# Patient Record
Sex: MALE | Race: BLACK OR AFRICAN AMERICAN | NOT HISPANIC OR LATINO | Employment: UNEMPLOYED | URBAN - METROPOLITAN AREA
[De-identification: names, ages, dates, MRNs, and addresses within clinical notes are randomized per-mention and may not be internally consistent; named-entity substitution may affect disease eponyms.]

---

## 2017-12-04 ENCOUNTER — HOSPITAL ENCOUNTER (INPATIENT)
Facility: CLINIC | Age: 22
LOS: 3 days | Discharge: HOME | DRG: 885 | End: 2017-12-07
Attending: EMERGENCY MEDICINE | Admitting: PSYCHIATRY & NEUROLOGY
Payer: COMMERCIAL

## 2017-12-04 ENCOUNTER — TELEPHONE (OUTPATIENT)
Dept: BEHAVIORAL HEALTH | Facility: CLINIC | Age: 22
End: 2017-12-04

## 2017-12-04 DIAGNOSIS — F12.20 MARIJUANA DEPENDENCE (H): ICD-10-CM

## 2017-12-04 DIAGNOSIS — F29 PSYCHOSIS, UNSPECIFIED PSYCHOSIS TYPE (H): ICD-10-CM

## 2017-12-04 LAB
AMPHETAMINES UR QL SCN: NEGATIVE
BARBITURATES UR QL: NEGATIVE
BENZODIAZ UR QL: NEGATIVE
CANNABINOIDS UR QL SCN: POSITIVE
COCAINE UR QL: NEGATIVE
ETHANOL SERPL-MCNC: <0.01 G/DL
OPIATES UR QL SCN: NEGATIVE
PCP UR QL SCN: NEGATIVE

## 2017-12-04 PROCEDURE — 80320 DRUG SCREEN QUANTALCOHOLS: CPT | Performed by: EMERGENCY MEDICINE

## 2017-12-04 PROCEDURE — 12400006 ZZH R&B MH INTERMEDIATE

## 2017-12-04 PROCEDURE — 90791 PSYCH DIAGNOSTIC EVALUATION: CPT

## 2017-12-04 PROCEDURE — 80307 DRUG TEST PRSMV CHEM ANLYZR: CPT | Performed by: EMERGENCY MEDICINE

## 2017-12-04 PROCEDURE — 99285 EMERGENCY DEPT VISIT HI MDM: CPT | Mod: 25

## 2017-12-04 RX ORDER — HYDROXYZINE HYDROCHLORIDE 25 MG/1
25-50 TABLET, FILM COATED ORAL EVERY 4 HOURS PRN
Status: DISCONTINUED | OUTPATIENT
Start: 2017-12-04 | End: 2017-12-07 | Stop reason: HOSPADM

## 2017-12-04 ASSESSMENT — ACTIVITIES OF DAILY LIVING (ADL)
RETIRED_COMMUNICATION: 0-->UNDERSTANDS/COMMUNICATES WITHOUT DIFFICULTY
TOILETING: 0-->INDEPENDENT
RETIRED_EATING: 0-->INDEPENDENT
BATHING: 0-->INDEPENDENT
TRANSFERRING: 0-->INDEPENDENT
SWALLOWING: 0-->SWALLOWS FOODS/LIQUIDS WITHOUT DIFFICULTY
DRESS: 0-->INDEPENDENT
AMBULATION: 0-->INDEPENDENT
COGNITION: 0 - NO COGNITION ISSUES REPORTED
FALL_HISTORY_WITHIN_LAST_SIX_MONTHS: NO

## 2017-12-04 ASSESSMENT — ENCOUNTER SYMPTOMS: DYSPHORIC MOOD: 1
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
## 2017-12-04 NOTE — ED NOTES
Bed: 2  Expected date:   Expected time:   Means of arrival:   Comments:  514  24 M depressed/suicidal/coop  1500

## 2017-12-04 NOTE — IP AVS SNAPSHOT
[place] [address] [place] [address]    Phone:  [phone]                                       After Visit Summary   12/4/2017    Natalio Rodas    MRN: [id number]           After Visit Summary Signature Page     I have received my discharge instructions, and my questions have been answered. I have discussed any challenges I see with this plan with the nurse or doctor.    ..........................................................................................................................................  Patient/Patient Representative Signature      ..........................................................................................................................................  Patient Representative Print Name and Relationship to Patient    ..................................................               ................................................  Date                                            Time    ..........................................................................................................................................  Reviewed by Signature/Title    ...................................................              ..............................................  Date                                                            Time
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
## 2017-12-04 NOTE — IP AVS SNAPSHOT
MRN:[id number]                      After Visit Summary   12/4/2017    Natalio Rodas    MRN: [id number]           Thank you!     Thank you for choosing [facility] for your care. Our goal is always to provide you with excellent care.        Patient Information     Date Of Birth          1995        Designated Caregiver       Most Recent Value    Caregiver    Will someone help with your care after discharge? no      About your hospital stay     You were admitted on:  December 4, 2017 You last received care in the:  [facility]    You were discharged on:  December 7, 2017       Who to Call     For medical emergencies, please call 911.  For non-urgent questions about your medical care, please call your primary care provider or clinic, None          Attending Provider     Provider Specialty    Jocelyne Foreman MD Emergency Medicine    [facility], Ana Cazares MD Psychiatry       Primary Care Provider Fax #    Physician No Ref-Primary [phone]      Further instructions from your care team       Behavioral Discharge Planning and Instructions    Summary:  Admitted for worsening thought disorder.     Main Diagnosis:  Schizophrenia, in acute exacerbation; Cannabis Use Disorder; Anxiety Disorder, unspecified; Tobacco Use Disorder.     Major Treatments, Procedures and Findings:  Psychiatric assessment. Medication adjustment.     Symptoms to Report: Feeling more agitated, Increased confusion, Losing more sleep, Mood getting worse or Thoughts of suicide    Lifestyle Adjustment:  Follow all treatment recommendations. Develop and follow safety plan.  Abstain from the use of all mood-altering substances, including alcohol and marijuana, as usage may increase symptoms of psychosis. Maintain sobriety by attending daily AA or NA meetings and obtaining a sponsor.     Psychiatry Follow-up:     You indicated that you have been seen previously at [facility] in  [place] for psychiatry. You declined to sign a release of information form for [facility] so we were unable to set up any follow up appointments for you prior to discharge. You are encouraged to call and set up a follow up appointment with your current provider after discharge.     [facility] [address], [address]  [place] [address]  [phone] / [phone] fax    Resources:   Crisis Intervention: 479.237.4208 or 314-503-5820 (TTY: 596.258.3396).  Call anytime for help.  National [place] on Mental Illness (www.[place].rozina.org): 107.834.2066 or 943-278-3770.  Alcoholics Anonymous (www.alcoholics-anonymous.org): Check your phone book for your local chapter.  National Suicide Prevention Line (www.mentalhealthmn.org): 066-706-AXZN (3555)  COPE (Crisis Services for Adults) in [facility]: 620.635.2558 (Available 24/7)    General Medication Instructions:   See your medication sheet(s) for instructions.   Take all medicines as directed.  Make no changes unless your doctor suggests them.   Go to all your doctor visits.  Be sure to have all your required lab tests. This way, your medicines can be refilled on time.  Do not use any drugs not prescribed by your doctor.  Avoid alcohol.      Pending Results     No orders found from 12/2/2017 to 12/5/2017.            Statement of Approval     Ordered          12/07/17 8399  I have reviewed and agree with all the recommendations and orders detailed in this document.  EFFECTIVE NOW     Approved and electronically signed by:  Ana Jones MD             Admission Information     Date & Time Provider Department Dept. Phone    12/4/2017 Ana Jones MD [facility] [phone]      Your Vitals Were     Blood Pressure Pulse Temperature Respirations Height Weight    131/90 70 97.9  F (36.6  C) (Oral) 16 1.778 m (5' 10\") 63.5 kg (140 lb)    Pulse Oximetry BMI " "(Body Mass Index)                99% 20.09 kg/m2          [employer] Information     [employer] lets you send messages to your doctor, view your test results, renew your prescriptions, schedule appointments and more. To sign up, go to [email or web address] . Click on \"Log in\" on the left side of the screen, which will take you to the Welcome page. Then click on \"Sign up Now\" on the right side of the page.     You will be asked to enter the access code listed below, as well as some personal information. Please follow the directions to create your username and password.     Your access code is: [email or web address]  Expires: 3/7/2018 12:29 PM     Your access code will  in 90 days. If you need help or a new code, please call your [facility] clinic or [phone].        Care EveryWhere ID     This is your Care EveryWhere ID. This could be used by other organizations to access your [facility] medical records  [id number]        Equal Access to Services     [facility]: Hadii hakan garciao Sobel, waaxda luqadaha, qaybta kaalmada adeegyazoila, shola horner . So [facility] [phone].    ATENCIÓN: Si habla español, tiene a burciaga disposición servicios gratuitos de asistencia lingüística. [facility] [phone].    We comply with applicable federal civil rights laws and [place] laws. We do not discriminate on the basis of race, color, national origin, age, disability, sex, sexual orientation, or gender identity.               Review of your medicines      START taking        Dose / Directions    ARIPiprazole 10 MG tablet   Commonly known as:  ABILIFY   Used for:  Psychosis, unspecified psychosis type        Dose:  10 mg   Start taking on:  2017   Take 1 tablet (10 mg) by mouth daily   Quantity:  30 tablet   Refills:  0       hydrOXYzine 25 MG tablet   Commonly known as:  ATARAX   Used for:  Psychosis, unspecified psychosis type        Dose:  25-50 mg   Take 1-2 tablets (25-50 mg) by mouth every 6 hours " as needed for anxiety   Quantity:  120 tablet   Refills:  0            Where to get your medicines      These medications were sent to [facility] [place], [place] - [address]  [address] [place] [address]     Phone:  [phone]     ARIPiprazole 10 MG tablet    hydrOXYzine 25 MG tablet                Protect others around you: Learn how to safely use, store and throw away your medicines at www.disposemymeds.org.             Medication List: This is a list of all your medications and when to take them. Check marks below indicate your daily home schedule. Keep this list as a reference.      Medications           Morning Afternoon Evening Bedtime As Needed    ARIPiprazole 10 MG tablet   Commonly known as:  ABILIFY   Take 1 tablet (10 mg) by mouth daily   Start taking on:  12/8/2017   Last time this was given:  10 mg on 12/7/2017  8:08 AM                                hydrOXYzine 25 MG tablet   Commonly known as:  ATARAX   Take 1-2 tablets (25-50 mg) by mouth every 6 hours as needed for anxiety                                          More Information        Schizophrenia (General Type)  Schizophrenia is a chronic, often disabling mental health disorder that makes functioning in work and society difficult. It is a type of psychosis, and involves perceiving reality differently from those around you. The difference been reality and what you think become blurred in your mind.  The cause of schizophrenia is not yet known. It is believed to be a result of genetic and biological factors (brain chemistry and structure). Schizophrenia does run in families and occurs in about 1% of the adult population. Environmental factors may also have a role in schizophrenia. These may include where you grew up, toxins, and infections.  Symptoms include:    Hallucinations (seeing or hearing things that are not there)    Delusions (false beliefs)    Disorganized thinking and speech    Social  withdrawal    Severe anxiety    Feeling unreal    Paranoia    Insomnia    Trouble thinking or concentrating clearly    Depression, feeling suicidal    Withdrawal from those around you  Medicines and therapy can help with many of the symptoms and allow for better daily function and quality of life. These medicines take 2 to 4 weeks to start working and 6 to 8 weeks to take full effect.  It is common to feel that you are not ill and that you don't need treatment. It is important to accept the support of friends and family in continuing to take your medicine.  Home care    Ongoing care and support helps manage this disease. Find a healthcare provider and therapist who meet your needs. Seek help when you feel like your symptoms are getting worse.    Tell each of your healthcare providers about all of the prescription medicines, over-the-counter medicines, and supplements you take. Certain supplements interact with medicines and can cause dangerous side effects. Ask your pharmacist when you have questions about drug interactions.    Be sure to take all of your medicine as directed and get regular blood work to check your medicine level and your overall health. Take the medicines and get the follow-up lab work as prescribed, even if you think you don t need it.    Seek support from trusted friends or family by talking about your feelings and thoughts. Ask them to help you recognize behavior changes early so you can get help and medicines can be adjusted.    If you are having trouble managing workplace issues, or caring for yourself because of your schizophrenia, contact your local Americans with Disabilities (ADA) office to see if they can help. The U.[place]. Department of Justice operates a toll-free ADA information line at: 339.393.2740 (voice) or 936-628-2676 (TTY). They can help you locate a local office.  Follow-up care  Follow up with your doctor or therapist , or as advised.  Call 911  Call 911 if you:    Have suicidal  thoughts, a suicide plan, and the means to carry out the plan    Have trouble breathing    Are very confused    Are very drowsy or have trouble awakening    Feel faint or lose consciousness    Have rapid heart rate, very low heart rate, or a new irregular heart rate    Have a seizure  When to seek medical advice  Call your healthcare provider right away if any of these occur:    Your symptoms are getting worse    Family or friends express concern over your behavior and ask you to seek help    Feeling out of control or that you are being controlled by others    Feeling like you want to harm yourself or another    Unable to care for yourself    Worsening hallucinations (hearing voices)    Hearing voices that are telling you to harm yourself or others    Worsening depression or anxiety  Date Last Reviewed: 9/29/2015 2000-2017 [employer]. [address] [place] [address]. All rights reserved. This information is not intended as a substitute for professional medical care. Always follow your healthcare professional's instructions.                Recovering from Addiction  Recovery means making a new life for yourself. This includes finding new interests. It involves building new relationships. It means taking better care of yourself. These will all help you replace substance use with a new and healthier life. They will also help you avoid the things that could make you want to use again.    Make lifestyle changes  A big part of recovery is changing habits. These are habits that may have led to your substance abuse. It s also a time for personal growth. Below are some changes you may want to make.    Find new activities and goals. You may want to try new hobbies and interests. Or, you may want to join an activity group to meet new people.    Build relationships. You may choose to spend more time with loved ones you lost touch with while you were using. You may also want to make new friends. And  there may be some friends or family members you will not want to see because they are still using.    Exercise and eat well. Get some physical activity on most days. This can help you feel better. Eat healthy meals with lots of fruits, vegetables, and whole grains. This can also help your well-being. A nutritionist or fitness expert can help you.    Maintain ties with medical and addiction professionals. While you may not need intense professional support, it is important to have reliable safety nets so you can access professional assistance when needed.    Relax and get enough sleep. Good sleep can help you feel better. So can less stress. Ask your counselor about relaxation exercises. These may include meditation. Also ask about stress management classes.  Date Last Reviewed: 2/1/2017 2000-2017 [employer]. [address] [place] [address], [place] [address]. All rights reserved. This information is not intended as a substitute for professional medical care. Always follow your healthcare professional's instructions.                Understanding the Disease of Addiction  What is addiction?  Addiction is a long-lasting (chronic) disease of the brain. It affects how your brain learns and works.Your genes and your environment can affect your risk for addiction. A family history of addiction also raises your risk. But anyone can have an addiction.Unfortunately, many people falsely think that addiction is a moral weakness. They think that people addicted to drugs or alcohol are just behaving badly or making poor choices.  How does addiction affect my brain?  Whether you start using drugs or alcohol is your choice. But once your brain is exposed to the addictive substance, your brain begins to change. This is especially true if you are more at risk for addiction. These brain changes overpower your self-control. This happens because the substance overexcites the brain s reward center. The substance mimics the  brain's own natural feel-good chemicals. The brain is rewired into believing that the substance is a good thing and that you need it to survive. This rewiring is very strong. Over time, you no longer find pleasure in other things you once enjoyed. The addiction is more powerful.  If you keep using the substance, your brain makes less of its own feel-good chemicals. You then must keep using drugs or alcohol to try to make up for the low levels of the brain chemicals. Over time your brain needs more and more of the drug or alcohol to achieve this. You need the drug. You no longer think about the physical, emotional, and social harm it causes.  Can you become addicted to things other than drugs or alcohol?  Addiction can happen in response to other pleasurable things that stimulate the brain s reward center. These things include eating, having sex, gambling, using tobacco, and using the internet.  Can you get control over a brain disease?  The only way to get over an addiction is to stop using the substance. Not using it lets your brain recover and go back to its normal functioning. You can relearn how to find pleasure in other things again. But your brain will always be at risk for addiction. Addiction is very powerful. So you usually will need medical help and social support for long-term success.  Addiction is a chronic condition. It s common for people who are recovering from addiction to start using the substance again (called a relapse). This doesn t mean that treatment doesn t work. Just like other chronic health conditions, addiction requires ongoing treatment that changes as the person s needs change.    Date Last Reviewed: 5/1/2017 2000-2017 [employer]. [address] [place] [address], [place] [address]. All rights reserved. This information is not intended as a substitute for professional medical care. Always follow your healthcare professional's instructions.

## 2017-12-05 LAB
ANION GAP SERPL CALCULATED.3IONS-SCNC: 10 MMOL/L (ref 3–14)
BASOPHILS # BLD AUTO: 0 10E9/L (ref 0–0.2)
BASOPHILS NFR BLD AUTO: 0.5 %
BUN SERPL-MCNC: 9 MG/DL (ref 7–30)
CALCIUM SERPL-MCNC: 9 MG/DL (ref 8.5–10.1)
CHLORIDE SERPL-SCNC: 105 MMOL/L (ref 94–109)
CO2 SERPL-SCNC: 26 MMOL/L (ref 20–32)
CREAT SERPL-MCNC: 0.98 MG/DL (ref 0.66–1.25)
DIFFERENTIAL METHOD BLD: NORMAL
EOSINOPHIL # BLD AUTO: 0.1 10E9/L (ref 0–0.7)
EOSINOPHIL NFR BLD AUTO: 1.1 %
ERYTHROCYTE [DISTWIDTH] IN BLOOD BY AUTOMATED COUNT: 13 % (ref 10–15)
GFR SERPL CREATININE-BSD FRML MDRD: >90 ML/MIN/1.7M2
GLUCOSE SERPL-MCNC: 84 MG/DL (ref 70–99)
HCT VFR BLD AUTO: 45 % (ref 40–53)
HGB BLD-MCNC: 15.3 G/DL (ref 13.3–17.7)
IMM GRANULOCYTES # BLD: 0 10E9/L (ref 0–0.4)
IMM GRANULOCYTES NFR BLD: 0.1 %
LYMPHOCYTES # BLD AUTO: 2.3 10E9/L (ref 0.8–5.3)
LYMPHOCYTES NFR BLD AUTO: 29.8 %
MCH RBC QN AUTO: 30.4 PG (ref 26.5–33)
MCHC RBC AUTO-ENTMCNC: 34 G/DL (ref 31.5–36.5)
MCV RBC AUTO: 89 FL (ref 78–100)
MONOCYTES # BLD AUTO: 0.5 10E9/L (ref 0–1.3)
MONOCYTES NFR BLD AUTO: 7 %
NEUTROPHILS # BLD AUTO: 4.7 10E9/L (ref 1.6–8.3)
NEUTROPHILS NFR BLD AUTO: 61.5 %
NRBC # BLD AUTO: 0 10*3/UL
NRBC BLD AUTO-RTO: 0 /100
PLATELET # BLD AUTO: 321 10E9/L (ref 150–450)
POTASSIUM SERPL-SCNC: 3.9 MMOL/L (ref 3.4–5.3)
RBC # BLD AUTO: 5.04 10E12/L (ref 4.4–5.9)
SODIUM SERPL-SCNC: 141 MMOL/L (ref 133–144)
TSH SERPL DL<=0.005 MIU/L-ACNC: 0.81 MU/L (ref 0.4–4)
WBC # BLD AUTO: 7.6 10E9/L (ref 4–11)

## 2017-12-05 PROCEDURE — 80048 BASIC METABOLIC PNL TOTAL CA: CPT | Performed by: PSYCHIATRY & NEUROLOGY

## 2017-12-05 PROCEDURE — 84443 ASSAY THYROID STIM HORMONE: CPT | Performed by: PSYCHIATRY & NEUROLOGY

## 2017-12-05 PROCEDURE — 25000132 ZZH RX MED GY IP 250 OP 250 PS 637: Performed by: PSYCHIATRY & NEUROLOGY

## 2017-12-05 PROCEDURE — 12400006 ZZH R&B MH INTERMEDIATE

## 2017-12-05 PROCEDURE — 85025 COMPLETE CBC W/AUTO DIFF WBC: CPT | Performed by: PSYCHIATRY & NEUROLOGY

## 2017-12-05 PROCEDURE — 36415 COLL VENOUS BLD VENIPUNCTURE: CPT | Performed by: PSYCHIATRY & NEUROLOGY

## 2017-12-05 PROCEDURE — 99221 1ST HOSP IP/OBS SF/LOW 40: CPT | Mod: AI | Performed by: PHYSICIAN ASSISTANT

## 2017-12-05 RX ORDER — QUETIAPINE FUMARATE 25 MG/1
25-50 TABLET, FILM COATED ORAL EVERY 6 HOURS PRN
Status: DISCONTINUED | OUTPATIENT
Start: 2017-12-05 | End: 2017-12-07 | Stop reason: HOSPADM

## 2017-12-05 RX ORDER — ARIPIPRAZOLE 10 MG/1
10 TABLET ORAL DAILY
Status: DISCONTINUED | OUTPATIENT
Start: 2017-12-05 | End: 2017-12-07 | Stop reason: HOSPADM

## 2017-12-05 RX ADMIN — ARIPIPRAZOLE 10 MG: 10 TABLET ORAL at 12:35

## 2017-12-05 RX ADMIN — QUETIAPINE FUMARATE 50 MG: 25 TABLET, FILM COATED ORAL at 18:24

## 2017-12-05 ASSESSMENT — ACTIVITIES OF DAILY LIVING (ADL)
LAUNDRY: WITH SUPERVISION
DRESS: STREET CLOTHES;INDEPENDENT
GROOMING: INDEPENDENT
ORAL_HYGIENE: INDEPENDENT
DRESS: SCRUBS (BEHAVIORAL HEALTH)
LAUNDRY: WITH SUPERVISION
GROOMING: INDEPENDENT;SHOWER
ORAL_HYGIENE: INDEPENDENT
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
## 2017-12-05 NOTE — ED NOTES
Pt is slow to respond to questions, takes 20-30sec to respond to a question. Only responds to about half of questions asked. Flat affect. Calm and cooperative.

## 2017-12-05 NOTE — TELEPHONE ENCOUNTER
S: Lindsay gave clinical saying pt was bib ems to [facility] er after he called 911 due to saying he was turning himself in b/c he felt he might die and was scared he may stop eating and living and caring about life.  B: hx of psych admit at [facility] 1 yr ago. Hx of marij induced psychosis. Utox pos for marij. He denies SI and HI. No hx of SA's. He said he does not feel safe going home but can not elaborate on this. He said he does not know how to put his bed down. He said he needs to eat and sleep. He says he has not slept in days. He denies biggs's. Dx: cannabis induced psychotic d/o. No chronic med prob's.   A: delayed answers or no responses, flat affect, almost catatonic, med cleared, coop   R: emergency admission hold; #77/martha accepted for [facility]

## 2017-12-05 NOTE — PLAN OF CARE
Problem: Depressive Symptoms  Goal: Depressive Symptoms  Signs and symptoms of listed problems will be absent or manageable.   Patient presents with a flat and blunt affect. He was visible but withdrawn from peers. Patient spent majority of the shift in the [facility] lounge pacing and watching TV. He appears to be internally preoccupied, but denies any hallucinations. Patient was cooperative with medications. He stated in 1:1 that he feels the Ablifiy has helped with his racing thoughts, but he feels that he would be able fully heal if he was able to go home and come back for groups. Patient was pleasant with staff and peers throughout the shift.

## 2017-12-05 NOTE — ED NOTES
"Gave patient chex ceral, apple juice and orange juice. When staff gave patient the spoon and removed the plastic wrap patient responded with \" Thanks for taking the plastic I cherish that\".    "

## 2017-12-05 NOTE — PROGRESS NOTES
12/04/17 2343   Patient Belongings   Did you bring any home meds/supplements to the hospital?  No   Disposition of Belongings Locker   Belongings Search Yes   Clothing Search Yes   Second Staff Angie Jeans Belt Boxers T shirt Shoes w/ geno  Cell phone (cracked)  Wallet  Insurance cards  [place] 's license      Security Envelope #[id number]  Visa [phone]  Visa [phone]  Visa [phone]          Admission:  I am responsible for any personal items that are not sent to the safe or pharmacy.  [facility] is not responsible for loss, theft or damage of any property in my possession.    Signature:  _________________________________ Date: _______  Time: _____                                              Staff Signature:  ____________________________ Date: ________  Time: _____      2nd Staff person, if patient is unable/unwilling to sign:    Signature: ________________________________ Date: ________  Time: _____     Discharge:  [facility] has returned all of my personal belongings:    Signature: _________________________________ Date: ________  Time: _____                                          Staff Signature:  ____________________________ Date: ________  Time: _____

## 2017-12-05 NOTE — H&P
PRIMARY CARE PROVIDER:  Unlisted.      CHIEF COMPLAINT:  Withdrawal.      HISTORY OF PRESENT ILLNESS:  Natalio Rodas is a 22-year-old male with past medical history of psychosis thought to be secondary to substance abuse who presented to the Emergency Department initially for evaluation of withdrawal.  However, on further conversation with the patient, it appeared that he had decompensated in regard to his mental health status.  He was making statements of being afraid to be alone and feeling like giving up on life.  On further discussion with the patient's family, it appears that he has had increasing stressors recently and there have been some concerns of his inability to cope with these stressors.  There is also concern from the Emergency Department provider as well as DEC  that the patient was unsafe to discharge.  Therefore, he was recommended to be admitted to the Psychiatric Unit for further evaluation.      The patient is presently evaluated in the [facility] side of Inpatient Psychiatry.  He currently reports that he would like to speak with a therapist and that he is not interested in speaking with anyone else.  He is cooperative on examination.  He gives a limited history regarding his living situation and any personal details.  He does deny any recent fevers, chills or cough or difficulty breathing.      PAST MEDICAL HISTORY:   1.  Psychosis, thought to be secondary to substance abuse, specifically cannabis abuse.  The patient notably was admitted at the [facility] and a stay of commitment was obtained at that time.  This was in 09/2017.   2.  Schizophrenia.      PAST SURGICAL HISTORY:  None.      PRIOR TO ADMISSION MEDICATIONS:  The patient is not taking any medications at this time.      ALLERGIES:  No known drug allergies.      FAMILY HISTORY:  Reviewed with the patient and not felt to be contributory to the present admission.      SOCIAL HISTORY:  The patient currently is living  with his father.  He is using marijuana on a regular basis, although he is unable to tell me exactly how much.  He denies consuming alcohol.      REVIEW OF SYSTEMS:  A 10-point review of systems was performed and is otherwise negative.  Please refer to the HPI.      PHYSICAL EXAMINATION:     VITAL SIGNS:  Temperature 98.7, heart rate of 62, respiratory rate of 16, blood pressure 139/101.  SpO2 99% on room air.   GENERAL:  This is a well-developed, well-nourished male who appears comfortable.   HEENT:  Head is normocephalic.  Pupils are equal, round, and react to light bilaterally.  EOMs are intact bilaterally.  Nose and mouth are patent.  Mucous membranes are moist.   LUNGS:  Clear to auscultation bilaterally without wheezes or crackles.   CARDIOVASCULAR:  Regular rate and rhythm, normal S1 and S2.   ABDOMEN:  Soft, nontender, nondistended.  The patient is spontaneously moving bilateral upper and lower extremities.  Gait is normal.   SKIN:  Warm and dry, no rash, no pedal edema.      LABORATORY AND IMAGING:  As noted in the HPI and as reviewed in [facility].      ASSESSMENT AND PLAN:  Natalio Rodas is a 22-year-old male with past medical history of psychosis as well as schizophrenia who presented to the Emergency Department for an unknown  chief complaint; however, was not found to be safe to be discharged secondary to concern of the patient's mental state, and therefore was admitted to Inpatient Psychiatry.   1.  Psychosis with underlying schizophrenia.  Will ask Inpatient Psychiatry to evaluate and treat as appropriate.   2.  Substance abuse, specifically in the form of cannabis.  Would encourage cessation as an outpatient and enrolling in an outpatient treatment program.   3.  Deep venous thrombosis prophylaxis:  Ambulation.      CODE STATUS:  This patient is full code.       We, the Hospitalist Service, thank you for this consultation.  The patient is medically stable.  We will sign off at this time.  This patient  was staffed with Dr. Leena Freed who independently interviewed and evaluated the patient and is in agreement with the above-mentioned plan.         LEENA FREED MD       As dictated by LAILA BAER PA-C            D: 2017 16:01   T: 2017 16:47   MT: ADALID      Name:     RACHEL KNOX   MRN:      [id number]        Account:      [id number]   :      1995           Admitted:     [id number]      Document: [id number]

## 2017-12-05 NOTE — PHARMACY-ADMISSION MEDICATION HISTORY
Admission medication history interview status for the 12/4/2017  admission is complete. See EPIC admission navigator for prior to admission medications     Medication history source reliability:Poor    Actions taken by pharmacist (provider contacted, etc):None     Additional medication history information not noted on PTA med list :None    Medication reconciliation/reorder completed by provider prior to medication history? No    Time spent in this activity: comment. Patient states he takes only medications he can steal and cannabis .     Prior to Admission medications    Not on File

## 2017-12-05 NOTE — PROGRESS NOTES
Pt admit from our ED, called 911 because he was feeling withdrawn and delusional from staying up days at a time. Pt is delayed in responses, states he is hearing voices, but wont explain. Utox pos for Marijuana.     Welcome packet reviewed with patient. Information reviewed includes getting emergency help, preventing infections, understanding your care, using medication safely, reducing falls, preventing pressure ulcers, smoking cessation, powerful choices and Patients Bill of Rights. Pt. given tour of the unit and instruction on use of facility including emergency call light. Program schedule reviewed with patient. Questions regarding the unit addressed. Pt. Search completed and belongings inventoried. Nursing assessment complete including patient and medication profiles. Risk assessments completed addressing suicide,fall,skin,nutrition and safety issues. Care plan initiated. Assessments reviewed with physician and admit orders received.

## 2017-12-06 PROCEDURE — 25000132 ZZH RX MED GY IP 250 OP 250 PS 637: Performed by: PSYCHIATRY & NEUROLOGY

## 2017-12-06 PROCEDURE — 90853 GROUP PSYCHOTHERAPY: CPT

## 2017-12-06 PROCEDURE — 12400006 ZZH R&B MH INTERMEDIATE

## 2017-12-06 PROCEDURE — 97150 GROUP THERAPEUTIC PROCEDURES: CPT | Mod: GO

## 2017-12-06 RX ADMIN — ARIPIPRAZOLE 10 MG: 10 TABLET ORAL at 08:18

## 2017-12-06 ASSESSMENT — ACTIVITIES OF DAILY LIVING (ADL)
ORAL_HYGIENE: INDEPENDENT
ORAL_HYGIENE: INDEPENDENT
DRESS: INDEPENDENT;SCRUBS (BEHAVIORAL HEALTH)
LAUNDRY: WITH SUPERVISION
DRESS: SCRUBS (BEHAVIORAL HEALTH)
GROOMING: INDEPENDENT
LAUNDRY: WITH SUPERVISION
GROOMING: INDEPENDENT

## 2017-12-06 NOTE — PLAN OF CARE
Problem: Depressive Symptoms  Goal: Depressive Symptoms  Signs and symptoms of listed problems will be absent or manageable.   Patient presents with a more full range affect. He was visible and more social on the unit. Patient attended afternoon groups and participated appropriately. Patient stated in 1:1 that his auditory hallucinations and racing thoughts have significantly decreased. Patient is hoping to be able to discharge tomorrow after his hold is up. Patient was cooperative with medication and pleasant with staff and peers.

## 2017-12-06 NOTE — PROGRESS NOTES
[facility] Psychiatric Progress Note      Interval History:   Pt seen, team meeting held with , nursing staff, OT, and PA's to review diagnosis and treatment plan.  Staff report the patient is doing better.  He reports his sleep throughout the night and reports reduction in his racing thoughts.  He states he is hoping to discharge from the hospital today but his hold is not until tomorrow.  He does not endorse current self-harm thoughts or plans.  He still admits to auditory hallucinations albeit to a lesser degree.  He describes future orientation and is interested in medication management following discharge from the hospital.     Review of systems:   The Review of Systems is negative other than noted in the HPI     Medications:       ARIPiprazole  10 mg Oral Daily     QUEtiapine, hydrOXYzine    Mental Status Examination:     Appearance:  awake, alert, adequately groomed and casually dressed  Eye Contact:  better  Speech:  clear, but nonspontaneous  Psychomotor Behavior:  no evidence of tardive dyskinesia, dystonia, or tics and fidgeting  Mood:  Neutral  Affect:  appropriate and in normal range and intensity is normal  Thought Process:  logical, linear and goal oriented no loose associations  Thought Content:  no evidence of suicidal ideation or homicidal ideation.  Internally preoccupied.   Oriented to:  time, person, and place  Attention Span and Concentration:  limited  Recent and Remote Memory:  limited  Fund of Knowledge: appropriate  Muscle Strength and Tone: normal  Gait and Station: Normal  Insight:  fair  Judgment:  limited          Labs/Vitals:   No results found for this or any previous visit (from the past 24 hour(s)).  B/P: 137/82, T: 98.1, P: 85, R: 16    Impression:   Natalio Rodas is a 22-year-old man with established history of mental illness, who had been previously diagnosed as having schizophrenia spectrum disorder and cannabis use disorder.  He was  discharged on a stay of commitment from the hospital in 2016 and presents at this time on account of worsening thought disorder.         DIagnoses:     1.  Schizophrenia, in acute exacerbation.   2.  Cannabis use disorder.   3.  Anxiety disorder, unspecified.   4.  Tobacco use disorder.         Plan:   1. Written information given on medications. Side effects, risks, benefits reviewed.  2.  Maintain current medications.  3.  Continue hospitalization on 72 hour hold.    Attestation:  Patient has been seen and evaluated by Ana nix MD    PATIENT ID  Name: Natalio Rodas  MRN:[id number]  YOB: 1995

## 2017-12-06 NOTE — PLAN OF CARE
"Problem: Depressive Symptoms  Goal: Depressive Symptoms  Signs and symptoms of listed problems will be absent or manageable.   Outcome: No Change  Patient was present on the unit. Is tense and anxious. Is able to be redirected but with some difficulty. Patient is paranoid at times with staff and peers: states that we are changing the clocks on him and that we are trying to \"psych\" him out. Was offered Seroquel, but declined it. Father came for a visit and brought his glasses. He seemed much more calmed after this happened. Approached staff for Seroquel around 1830. Took a nap. After this, woke up and was again tense and paranoid. Was posturing his hands strangely. Wanted to see all of the papers that he signed. Went back to bed.       "

## 2017-12-07 VITALS
RESPIRATION RATE: 16 BRPM | BODY MASS INDEX: 20.04 KG/M2 | WEIGHT: 140 LBS | DIASTOLIC BLOOD PRESSURE: 90 MMHG | TEMPERATURE: 97.9 F | SYSTOLIC BLOOD PRESSURE: 131 MMHG | HEART RATE: 70 BPM | HEIGHT: 70 IN | OXYGEN SATURATION: 99 %

## 2017-12-07 PROCEDURE — 97150 GROUP THERAPEUTIC PROCEDURES: CPT | Mod: GO

## 2017-12-07 PROCEDURE — 25000132 ZZH RX MED GY IP 250 OP 250 PS 637: Performed by: PSYCHIATRY & NEUROLOGY

## 2017-12-07 RX ORDER — ARIPIPRAZOLE 10 MG/1
10 TABLET ORAL DAILY
Qty: 30 TABLET | Refills: 0 | Status: ON HOLD | OUTPATIENT
Start: 2017-12-08 | End: 2018-01-03

## 2017-12-07 RX ORDER — HYDROXYZINE HYDROCHLORIDE 25 MG/1
25-50 TABLET, FILM COATED ORAL EVERY 6 HOURS PRN
Qty: 120 TABLET | Refills: 0 | Status: ON HOLD | OUTPATIENT
Start: 2017-12-07 | End: 2018-01-03

## 2017-12-07 RX ADMIN — ARIPIPRAZOLE 10 MG: 10 TABLET ORAL at 08:08

## 2017-12-07 ASSESSMENT — ACTIVITIES OF DAILY LIVING (ADL)
LAUNDRY: WITH SUPERVISION
ORAL_HYGIENE: INDEPENDENT
ORAL_HYGIENE: INDEPENDENT
DRESS: SCRUBS (BEHAVIORAL HEALTH);INDEPENDENT
GROOMING: INDEPENDENT;SHOWER
GROOMING: INDEPENDENT
LAUNDRY: WITH SUPERVISION
DRESS: INDEPENDENT

## 2017-12-07 NOTE — PROGRESS NOTES
met with patient this morning following a request by patient to meet with  to discuss follow up care. Because patient resides in [place],  suggested that patient follow up at [facility] in [place]. Patient then indicated that he has been seen there previously.  requested that patient sign a release of information form so that  can set up follow up appointments and fax his after visit summary to [facility]. Patient then became paranoid about signing a release form. He stated that he would need to review the previous releases he had signed before signing anything new.  offered to make copies of previously signed releases, including one for his parents. Patient then stated that he would wait until his 72 hour hold expires.  reviewed with him that  will not be here this evening so if any appointments need to be made, it would need to be done during the day. Patient then agreed that he would sign the BATSHEVA form.  went to get a form and fill it out with the appropriate information. When  then went to have patient sign the form, he declined and said he would wait until his father picks him up tonight.  consequently unable to make any follow up appointments on behalf of patient prior to discharge.

## 2017-12-07 NOTE — PLAN OF CARE
Problem: General Rehab Plan of Care  Goal: Occupational Therapy Goals  The patient and/or their representative will achieve their patient-specific goals related to the plan of care.  The patient-specific goals include:  INITIAL O.T. ASSESSMENT   Details:  Pt participated in OT group today. Affect was flat and a bit vacant. Pt grabbed a piece of paper and a colored pencil and began to draw. No plan was evident. Pt was offered more colored pencils, which he ignored initially. Pt spotted a piece of string and a plastic jewel on the floor and asked staff to hand them to him. He asked for glue and glued both to his folded drawing. Pt struggled with problem solving when the glue came out of the bottle too quickly, needed assistance. Behavior was disorganized and did not appear goal directed. Pt threw away his project when he left, discrediting his work.

## 2017-12-07 NOTE — DISCHARGE INSTRUCTIONS
Behavioral Discharge Planning and Instructions    Summary:  Admitted for worsening thought disorder.     Main Diagnosis:  Schizophrenia, in acute exacerbation; Cannabis Use Disorder; Anxiety Disorder, unspecified; Tobacco Use Disorder.     Major Treatments, Procedures and Findings:  Psychiatric assessment. Medication adjustment.     Symptoms to Report: Feeling more agitated, Increased confusion, Losing more sleep, Mood getting worse or Thoughts of suicide    Lifestyle Adjustment:  Follow all treatment recommendations. Develop and follow safety plan.  Abstain from the use of all mood-altering substances, including alcohol and marijuana, as usage may increase symptoms of psychosis. Maintain sobriety by attending daily AA or NA meetings and obtaining a sponsor.     Psychiatry Follow-up:     You indicated that you have been seen previously at [facility] in [place] for psychiatry. You declined to sign a release of information form for [facility] so we were unable to set up any follow up appointments for you prior to discharge. You are encouraged to call and set up a follow up appointment with your current provider after discharge.     [facility] [address], [address]  [place] [address]  [phone] / [phone] fax    Resources:   Crisis Intervention: 535.649.8035 or 418-672-9148 (TTY: 899.294.7348).  Call anytime for help.  National [place] on Mental Illness (www.[place].rozina.org): 304.391.3234 or 450-215-8616.  Alcoholics Anonymous (www.alcoholics-anonymous.org): Check your phone book for your local chapter.  National Suicide Prevention Line (www.mentalhealthmn.org): 153-595-YVFB (5254)  COPE (Crisis Services for Adults) in [facility]: 696.657.3053 (Available 24/7)    General Medication Instructions:   See your medication sheet(s) for instructions.   Take all medicines as directed.  Make no changes unless your doctor suggests  them.   Go to all your doctor visits.  Be sure to have all your required lab tests. This way, your medicines can be refilled on time.  Do not use any drugs not prescribed by your doctor.  Avoid alcohol.

## 2017-12-07 NOTE — PROGRESS NOTES
Pt denies suicidal ideation or self harm thoughts. Mood appears bright. Discharge instructions/teaching reviewed with Pt and father. Copy of discharge instructions given to Pt. Meds from discharge pharmacy given to Pt and reviewed. Questions answered. Pt left station 77 ambulatory at 1740 with all belongings, to go home with father via private car. Discharge complete.

## 2017-12-07 NOTE — PLAN OF CARE
Problem: Depressive Symptoms  Goal: Depressive Symptoms  Signs and symptoms of listed problems will be absent or manageable.   Outcome: Improving  Patient presents with a full range affect. He was visible and social on the unit. Patient attended groups and participated appropriately. Patient expressed in 1:1 that he feels his medication has made significant improvement with his racing thoughts. Patient's hold was dropped and patient is hoping to discharge later today. A discharge order was placed. Patient has been cooperative with medications and pleasant with peers and staff.

## 2017-12-07 NOTE — PLAN OF CARE
"Problem: Depressive Symptoms  Goal: Depressive Symptoms  Signs and symptoms of listed problems will be absent or manageable.   Outcome: No Change  Pt was present and pleasant within the ITC. Pt had a blunt flat affect and presented as tense. Pt spent most of the day in the lounge socializing with peers and staff. Pt seemed time and date disoriented multiple times throughout the shift. Pt gets paranoid about his time disorientation and thinks staff are \"messing with me.\" Pt still makes awkward hand gestures when he is confused. Pt ate all of his food and was med compliant.       "

## 2017-12-07 NOTE — PROGRESS NOTES
[facility] Psychiatric Progress Note      Interval History:   Pt seen, team meeting held with , nursing staff, OT, and PA's to review diagnosis and treatment plan.  Staff report the patient continues to show evidence of improvement. He reports he is no longer hearing voices but staff observe that he remains very paranoid. He says he wants to discharge from the hospital after his hold expires tonight. He says he wants to stay on his medication but he did not sign a BATSHEVA for staff to set up aftercare appointments for him. He says he had been seen at [facility] and [facility] in the past and he is willing to return there for medication management.      Review of systems:   The Review of Systems is negative other than noted in the HPI     Medications:       ARIPiprazole  10 mg Oral Daily     QUEtiapine, hydrOXYzine    Mental Status Examination:     Appearance:  awake, alert, adequately groomed and casually dressed  Eye Contact:  better  Speech:  clear, but nonspontaneous  Psychomotor Behavior:  no evidence of tardive dyskinesia, dystonia, or tics and fidgeting  Mood:  Neutral  Affect:  appropriate and in normal range and intensity is normal  Thought Process:  logical, linear and goal oriented no loose associations  Thought Content:  no evidence of suicidal ideation or homicidal ideation.  Internally preoccupied.   Oriented to:  time, person, and place  Attention Span and Concentration:  limited  Recent and Remote Memory:  limited  Fund of Knowledge: appropriate  Muscle Strength and Tone: normal  Gait and Station: Normal  Insight:  fair  Judgment:  limited          Labs/Vitals:   No results found for this or any previous visit (from the past 24 hour(s)).  B/P: 137/82, T: 98.1, P: 85, R: 16    Impression:   Natalio Rodas is a 22-year-old man with established history of mental illness, who had been previously diagnosed as having schizophrenia spectrum disorder and cannabis use disorder.   He was discharged on a stay of commitment from the hospital in 2016 and presents at this time on account of worsening thought disorder.         DIagnoses:     1.  Schizophrenia, in acute exacerbation.   2.  Cannabis use disorder.   3.  Anxiety disorder, unspecified.   4.  Tobacco use disorder.         Plan:   1. Written information given on medications. Side effects, risks, benefits reviewed.  2. Maintain current medications.  3. May be discharged after expiration of 72 hour hold.    Attestation:  Patient has been seen and evaluated by Ana nix MD    PATIENT ID  Name: Natalio Rodas  MRN:[id number]  YOB: 1995

## 2017-12-18 NOTE — DISCHARGE SUMMARY
"DATE OF ADMISSION:  12/04/2017      DATE OF DISCHARGE:  12/07/2017      DISCHARGE DIAGNOSES:   1.  Schizophrenia.   2.  Cannabis use disorder.   3.  Anxiety disorder, unspecified.   4.  Tobacco use disorder.      REASON FOR REFERRAL:  Natalio Rodas is a 22-year-old man with established history of mental illness who had been previously diagnosed as having schizophrenia spectrum disorder and cannabis use disorder.  He was discharged on a stay of commitment from the hospital in 2016 and presents at this time on account of worsening thought disorder.      CHIEF COMPLAINT:  \"I called the  and said I was giving up and did not want to kill myself anymore.\"      HISTORY OF PRESENT ILLNESS:  I refer the reader to the psychiatric evaluation documented on 12/05/2017 by Ana Jones MD in addition to the history and physical examination documented on 12/05/2017 by Venancio Carolina PA-C and attested by Ricki Main MD      HOSPITAL COURSE:  Following admission to the mental health unit, the patient was maintained on the ITC.  He was encouraged to participate in individual milieu and group therapy.  He was notably thought disorganized and inappropriate in his presentation.  He indicated that Zyprexa did not help him during him last hospitalization and so he was offered Abilify beginning at a dose of 10 mg daily.  He declined antidepressant medications in spite of his anxiety and dysphoria.  He was encouraged to consider Abilify Maintena to ensure medication compliance.  Collateral information provided by his mother suggested that he was sensitive to medications and she also gave a history of similar sensitivity in her family; however, the patient tolerated the medications without any side effects.  He reported reduction in his intrusive thoughts and auditory hallucinations, although he did not participate in individual milieu or group therapy.  He was isolative and withdrawn throughout his stay in the hospital.  By " 2017, he indicated that his father will be picking him up from the hospital.  Once his 72-hour hold , he was discharged to the custody of his father uneventfully.      LABORATORY DATA:  BMP and hemogram were within normal limits.  Urine toxicology screen was positive for cannabinoids.  Blood alcohol level was less than 0.01.      DISCHARGE MEDICATIONS:   1.  Atarax 25 mg 1-2 p.o. q.6 h. p.r.n.   2.  Abilify 10 mg p.o. daily.      DISPOSITION:  The patient was discharged to follow up at [facility] and elected to his set up his own followup appointment following discharge.      TIME SPENT:  35 minutes with more than 50% of the time spent spent in counseling and care coordination.         MOLLY PATEL MD             D: 2017 18:42   T: 2017 05:54   MT: lg      Name:     RACHEL KNOX   MRN:      [id number]        Account:        [id number]   :      1995           Admit Date:     [id number]                                  Discharge Date: 2017      Document: [id number]

## 2017-12-28 ENCOUNTER — HOSPITAL ENCOUNTER (INPATIENT)
Facility: CLINIC | Age: 22
LOS: 6 days | Discharge: HOME | DRG: 885 | End: 2018-01-03
Attending: FAMILY MEDICINE | Admitting: PSYCHIATRY & NEUROLOGY
Payer: COMMERCIAL

## 2017-12-28 ENCOUNTER — TELEPHONE (OUTPATIENT)
Dept: BEHAVIORAL HEALTH | Facility: CLINIC | Age: 22
End: 2017-12-28

## 2017-12-28 DIAGNOSIS — F20.0 ACUTE EXACERBATION OF CHRONIC PARANOID SCHIZOPHRENIA (H): ICD-10-CM

## 2017-12-28 DIAGNOSIS — F12.10 CANNABIS ABUSE: ICD-10-CM

## 2017-12-28 LAB
AMPHETAMINES UR QL SCN: NEGATIVE
BARBITURATES UR QL: NEGATIVE
BENZODIAZ UR QL: NEGATIVE
CANNABINOIDS UR QL SCN: NEGATIVE
COCAINE UR QL: NEGATIVE
ETHANOL UR QL SCN: NEGATIVE
OPIATES UR QL SCN: NEGATIVE

## 2017-12-28 PROCEDURE — 80307 DRUG TEST PRSMV CHEM ANLYZR: CPT | Performed by: FAMILY MEDICINE

## 2017-12-28 PROCEDURE — 99285 EMERGENCY DEPT VISIT HI MDM: CPT | Mod: 25 | Performed by: FAMILY MEDICINE

## 2017-12-28 PROCEDURE — 90791 PSYCH DIAGNOSTIC EVALUATION: CPT

## 2017-12-28 PROCEDURE — 99285 EMERGENCY DEPT VISIT HI MDM: CPT | Mod: Z6 | Performed by: FAMILY MEDICINE

## 2017-12-28 PROCEDURE — 12400007 ZZH R&B MH INTERMEDIATE UMMC

## 2017-12-28 PROCEDURE — 80320 DRUG SCREEN QUANTALCOHOLS: CPT | Performed by: FAMILY MEDICINE

## 2017-12-28 RX ORDER — TRAZODONE HYDROCHLORIDE 50 MG/1
50 TABLET, FILM COATED ORAL
Status: DISCONTINUED | OUTPATIENT
Start: 2017-12-28 | End: 2018-01-03 | Stop reason: HOSPADM

## 2017-12-28 RX ORDER — ARIPIPRAZOLE 10 MG/1
10 TABLET ORAL DAILY
Status: DISCONTINUED | OUTPATIENT
Start: 2017-12-29 | End: 2017-12-29

## 2017-12-28 RX ORDER — HYDROXYZINE HYDROCHLORIDE 25 MG/1
25-50 TABLET, FILM COATED ORAL EVERY 4 HOURS PRN
Status: DISCONTINUED | OUTPATIENT
Start: 2017-12-28 | End: 2018-01-03 | Stop reason: HOSPADM

## 2017-12-28 RX ORDER — OLANZAPINE 10 MG/2ML
10 INJECTION, POWDER, FOR SOLUTION INTRAMUSCULAR
Status: DISCONTINUED | OUTPATIENT
Start: 2017-12-28 | End: 2018-01-03 | Stop reason: HOSPADM

## 2017-12-28 RX ORDER — BISACODYL 10 MG
10 SUPPOSITORY, RECTAL RECTAL DAILY PRN
Status: DISCONTINUED | OUTPATIENT
Start: 2017-12-28 | End: 2018-01-03 | Stop reason: HOSPADM

## 2017-12-28 RX ORDER — ACETAMINOPHEN 325 MG/1
650 TABLET ORAL EVERY 4 HOURS PRN
Status: DISCONTINUED | OUTPATIENT
Start: 2017-12-28 | End: 2018-01-03 | Stop reason: HOSPADM

## 2017-12-28 RX ORDER — OLANZAPINE 10 MG/1
10 TABLET ORAL
Status: DISCONTINUED | OUTPATIENT
Start: 2017-12-28 | End: 2018-01-03 | Stop reason: HOSPADM

## 2017-12-28 RX ORDER — ALUMINA, MAGNESIA, AND SIMETHICONE 2400; 2400; 240 MG/30ML; MG/30ML; MG/30ML
30 SUSPENSION ORAL EVERY 4 HOURS PRN
Status: DISCONTINUED | OUTPATIENT
Start: 2017-12-28 | End: 2018-01-03 | Stop reason: HOSPADM

## 2017-12-28 ASSESSMENT — ACTIVITIES OF DAILY LIVING (ADL)
LAUNDRY: WITH SUPERVISION
ORAL_HYGIENE: INDEPENDENT
GROOMING: INDEPENDENT
DRESS: INDEPENDENT

## 2017-12-28 NOTE — IP AVS SNAPSHOT
[address] [address]    Phone:  [phone]                                       After Visit Summary   12/28/2017    Natalio Rodas    MRN: [id number]           After Visit Summary Signature Page     I have received my discharge instructions, and my questions have been answered. I have discussed any challenges I see with this plan with the nurse or doctor.    ..........................................................................................................................................  Patient/Patient Representative Signature      ..........................................................................................................................................  Patient Representative Print Name and Relationship to Patient    ..................................................               ................................................  Date                                            Time    ..........................................................................................................................................  Reviewed by Signature/Title    ...................................................              ..............................................  Date                                                            Time

## 2017-12-28 NOTE — ED NOTES
Bed: ED10  Expected date: 12/28/17  Expected time: 11:20 AM  Means of arrival:   Comments:  [place] [age]yo M crisis/psychosis

## 2017-12-28 NOTE — PHARMACY-ADMISSION MEDICATION HISTORY
Admission medication history interview status for the 12/28/2017 admission is complete. See Epic admission navigator for allergy information, pharmacy, prior to admission medications and immunization status.     Medication history interview sources:  Patient, Epic electronic medical record (discharged from [facility] on 12/7/17)    Changes made to PTA medication list (reason)  Added: none  Deleted: none  Changed: none    Additional medication history information (including reliability of information, actions taken by pharmacist): The patient was a moderately reliable historian. He answered questions but was slow to respond and possibly responding to internal stimuli. When asked about his medications upon his discharge from [facility] on 12/7/17, the patient reported he does not take the hydroxyzine and he only takes aripiprazole 2.5 mg daily not the 10 mg he was ordered at discharge. The patient reported he takes some vitamins/supplements at home but could not remember the names of them while he was in the ER.      Prior to Admission medications    Medication Sig Last Dose Taking? Auth Provider   hydrOXYzine (ATARAX) 25 MG tablet Take 1-2 tablets (25-50 mg) by mouth every 6 hours as needed for anxiety Patient has not taken at home Yes Ana Jones MD   ARIPiprazole (ABILIFY) 10 MG tablet Take 1 tablet (10 mg) by mouth daily 12/28/2017 at Unknown time Yes Ana Jones MD       Medication history completed by: Sinai Dee, PharmD, BCPP

## 2017-12-28 NOTE — TELEPHONE ENCOUNTER
R: ED called with report on pt, Same as previous telephone encounter. ED requested an IP  bed for pt to stabilize.    R: Pt accepted to station 10N under Dr. Oneal

## 2017-12-28 NOTE — ED NOTES
In and out of bed frequently, moving bedside table, moving water glasses, stood up on chair x 1 , redirected down, pacing in room.  Able to follow directions with delayed reactions.  Unable to give urine sample at this time.

## 2017-12-28 NOTE — IP AVS SNAPSHOT
MRN:[id number]                      After Visit Summary   12/28/2017    Natalio Rodas    MRN: [id number]           Thank you!     Thank you for choosing [facility] for your care. Our goal is always to provide you with excellent care.        Patient Information     Date Of Birth          1995        About your hospital stay     You were admitted on:  December 28, 2017 You last received care in the:  [address]    You were discharged on:  January 3, 2018        Reason for your hospital stay       Schizophrenia and substance use                  Who to Call     For medical emergencies, please call 911.  For non-urgent questions about your medical care, please call your primary care provider or clinic, None          Attending Provider     Provider Specialty    Mj Lino MD Family Practice    Dongre, Thierno Orona MD Psychiatry    Desrosier, Gregor Martinez MD Psychiatry       Primary Care Provider Fax #    Physician No Ref-Primary [phone]      After Care Instructions     Activity       Your activity upon discharge: activity as tolerated            Diet       Follow this diet upon discharge: Orders Placed This Encounter      Regular Diet Adult            Discharge Instructions       1. Please do not harm yourself or others.  2. Please continue to take your medications.  3. Please follow up with your outpatient care team.  4. Please do not take drugs or alcohol as this will worsen your condition.  5. Please do not take more than the prescribed doses of medications as this may make them dangerous.   6. Please follow your safety plan of action.  7. Please call crisis if having trouble.  8. If having thoughts of harming self or others please come in to the emergency department as soon as possible.                  Further instructions from your care team        Behavioral Discharge Planning and Instructions      Summary:  You were admitted on 12/28/2017  due to Psychotic Symptomology.   You were treated by Dr. Gregor Owens MD and discharged on 1/3/18 from Station 10 to Home    Principal Diagnosis: Schizophrenia    Health Care Follow-up Appointments:   *If no appointments scheduled, explain: you reported to [facility] that you would like to make your own mental health appointments. Please make a psychiatric appointment within 30 days of discharge.   Attend all scheduled appointments with your outpatient providers. Call at least 24 hours in advance if you need to reschedule an appointment to ensure continued access to your outpatient providers.   Major Treatments, Procedures and Findings:  You were provided with: a psychiatric assessment, assessed for medical stability, medication evaluation and/or management, group therapy and milieu management    Symptoms to Report: feeling more aggressive, increased confusion, losing more sleep, mood getting worse or thoughts of suicide    Early warning signs can include: increased depression or anxiety sleep disturbances increased thoughts or behaviors of suicide or self-harm  increased unusual thinking, such as paranoia or hearing voices    Safety and Wellness:  Take all medicines as directed.  Make no changes unless your doctor suggests them.      Follow treatment recommendations.  Refrain from alcohol and non-prescribed drugs.  Ask your support system to help you reduce your access to items that could harm yourself or others. If there is a concern for safety, call 911.    Resources:   Crisis Intervention: 603.199.4268 or 892-493-2510 (TTY: 165.862.8993).  Call anytime for help.  National [place] on Mental Illness (www.[place].rozina.org): 213.555.3493 or 498-947-5696.  Alcoholics Anonymous (www.alcoholics-anonymous.org): Check your phone book for your local chapter.  Suicide Awareness Voices of Education (SAVE) (www.save.org): 537-506-EXMC (6054)  National Suicide Prevention Line (www.mentalhealthmn.org): 724-624-UCKA (3547)  Mental Health Consumer/Survivor  "Network of [place] (www.[facility].net): 216-324-6566 or 654-928-1583  Mental Health Association of [place] (www.mentalhealth.org): 692.704.4327 or 630-030-9318  Self- Management and Recovery Training., SMART-- Toll free: 278.774.9311  www.[facility]  [facility] Crisis (COPE) Response - Adult 401 664-0079  Text 4 Life: txt \"LIFE\" to 71818 for immediate support and crisis intervention  Crisis text line: Text \"START\" to 452-324. Free, confidential, 24/7.  Crisis Intervention: 692.424.3395 or 519-399-2056. Call anytime for help.     The treatment team has appreciated the opportunity to work with you.     Please take care and make your recovery a daily recovery.   If you have any questions or concerns our unit number is [phone].  Thank you.         Pending Results     No orders found from 12/26/2017 to 12/29/2017.            Statement of Approval     Ordered          01/03/18 1107  I have reviewed and agree with all the recommendations and orders detailed in this document.  EFFECTIVE NOW     Approved and electronically signed by:  Gregor Owens MD             Admission Information     Date & Time Provider Department Dept. Phone    12/28/2017 Gregor Owens MD [facility] 10NB [phone]      Your Vitals Were     Blood Pressure Pulse Temperature Respirations Height Weight    143/87 106 97.8  F (36.6  C) (Tympanic) 16 1.778 m (5' 10\") 60.7 kg (133 lb 12.8 oz)    Pulse Oximetry BMI (Body Mass Index)                97% 19.2 kg/m2          [facility] Information     [employer] lets you send messages to your doctor, view your test results, renew your prescriptions, schedule appointments and more. To sign up, go to [email or web address] . Click on \"Log in\" on the left side of the screen, which will take you to the Welcome page. Then click on \"Sign up Now\" on the right side of the page.     You will be asked to enter the access code listed below, as well as some personal information. Please follow the " directions to create your username and password.     Your access code is: [email or web address]  Expires: 3/7/2018 12:29 PM     Your access code will  in 90 days. If you need help or a new code, please call your [facility] clinic or [phone].        Care EveryWhere ID     This is your Care EveryWhere ID. This could be used by other organizations to access your [facility] medical records  [id number]        Equal Access to Services     [facility] : Hadii aad ku hadasho Soomaali, waaxda luqadaha, qaybta kaalmada adeegyada, waxay audreyin hayaan andrew ferrell lailya . So [facility] [phone].    ATENCIÓN: Si habla español, tiene a burciaga disposición servicios gratuitos de asistencia lingüística. Llame al [phone].    We comply with applicable federal civil rights laws and [place] laws. We do not discriminate on the basis of race, color, national origin, age, disability, sex, sexual orientation, or gender identity.               Review of your medicines      STOP taking     ARIPiprazole 10 MG tablet   Commonly known as:  ABILIFY           hydrOXYzine 25 MG tablet   Commonly known as:  ATARAX                    Protect others around you: Learn how to safely use, store and throw away your medicines at www.disposemymeds.org.             Medication List: This is a list of all your medications and when to take them. Check marks below indicate your daily home schedule. Keep this list as a reference.      Notice     You have not been prescribed any medications.

## 2017-12-28 NOTE — LETTER
UR 10NB  [address]  [facility] [address]  [phone]    January 3, 2018    Re: Natalio Rodas  [address] [facility] [address]  [phone] (home) [phone] (work)    : 1995      To Whom It May Concern:      Natalio Rodas was hospitalized from 2017 until 1/3/2018 due to medical illness.  He may return to work and school on 18 with full duty.        Sincerely,        Gregor Mckenna MD

## 2017-12-28 NOTE — PROGRESS NOTES
12/28/17 1709   Patient Belongings   Did you bring any home meds/supplements to the hospital?  No   Patient Belongings other (see comments)   Disposition of Belongings Locker;Sent to security per site process   Belongings Search Yes   Clothing Search Yes   Second Staff search completed by Stevie AYON   General Info Comment see note    Locker #12: first plastic bag: water bottle, socks, red and blue t-shirt, blue jeans, tan hat. Second plastic bag: black shoes, belt, alix jacket. wallet with [employer] insurance card and identification card. 1 key attached to wallet.  Security #[id number]: Visa [id number]  A               Admission:  I am responsible for any personal items that are not sent to the safe or pharmacy.  [facility] is not responsible for loss, theft or damage of any property in my possession.    Signature:  _________________________________ Date: _______  Time: _____                                              Staff Signature:  ____________________________ Date: ________  Time: _____      2nd Staff person, if patient is unable/unwilling to sign:    Signature: ________________________________ Date: ________  Time: _____     Discharge:  [facility] has returned all of my personal belongings:    Signature: _________________________________ Date: ________  Time: _____                                          Staff Signature:  ____________________________ Date: ________  Time: _____

## 2017-12-28 NOTE — TELEPHONE ENCOUNTER
"S - [facility] calling with collateral on 23 yo male coming to [facility] ED     B - Pt's father called [facility]E this morning, pt has hx of schizophrenia.  Pt possibly having drug induced psychosis.  Pt had used thc in last few days.  Pt was at [facility] for 3 days last week.  Pt also known to use etoh.  Pt presenting today as disorganized, making bizarre statements.  Pt talking about how he figured out that if you trade coins you will contract a disease, talking about how people can read his thoughts through his Xbox.  Pt having inappropriate reactions and laughing bouts.  Pt had delayed responses to questions from .  Pt has no previous suicide attempts.  Pt asked [facility]E  if she was trying to confuse him and than randomly stated \"it's amnesia\".  Pt [facility]il[facility]  Pt sees psychiatrist.  Pt's father states pt isn't taking meds, pt states he's trying to wean himself off. Pt missed work last night, but did manage to call in.  Pt not sleeping, \"only sleeps when he wants to\".      A - on way to ED for evaluation     R - Pt needing evaluation for inpatient mental health d/t psychosis.   "

## 2017-12-28 NOTE — ED PROVIDER NOTES
"    [facility] EMERGENCY DEPARTMENT ([facility])    17       History     Chief Complaint   Patient presents with     Paranoid     hearing voices, xbox reading his thoughts     HPI  Natalio Rodas is a 22 year old male with a medical history significant for psychosis, ODD, schizophrenia spectrum disorder and cannabis use disorder. Per review of patient's chart, patient was recently hospitalized at [facility] from 2017 to 2017 after presenting for evaluation of withdrawal; however, on further conversation with patient, it had appeared that he had decompensation in regard to his mental health status.  Patient was found to be unsafe for discharge and was admitted to inpatient psychiatry.  While in the hospital he was notably disorganized and inappropriate in his presentation.  He was declining antidepressant medications.  The patient was encouraged to consider Abilify, which he ended up taking and tolerating without any side effects.  He noted at that time a reduction in his intrusive thoughts and auditory hallucinations, although he did not participate in group therapy or individual therapy.  He was isolated and withdrawn throughout his hospital stay.  After his 72 hour hold , he was discharged to the custody of his father.    Patient presents to the Emergency Department today for evaluation of paranoia.  The family reports that the patient has been disorganized with his thoughts at home recently and that the patient believes that \"his Xbox is reading his thoughts\".  Patient reports that he woke up last night from a dream and was unable to go back to sleep.  He then started playing his Xbox and was eventually able to fall back asleep.  Patient reports that he has been attempting to wean himself off of his Abilify, by taking micro doses.  The patient believes that his Abilify is causing his headaches.  When asked about any other medications that he is currently on, he " "asked \"like the alcohol and marijuana\".  When asked if he is using these for self-medication, he states he is taking them for the pain.  When asked if he is having any pain, he is not able to give a straight answer.  The patient's entire interview was hard to follow and nonlinear, and he would often stare off into nothing.  Patient is currently living with his dad and does not have a job currently.  The patient reports that someone named Kirsten left the house and believes that this is his dad's girlfriend.  The father reports that the patient has been acting very strangely at home and the father is having a difficult time explaining his behavior at home.  The father denies that the patient has been making any suicidal comments at home, though he reports that the patient most likely used some marijuana with his family members over Christmas.    I have reviewed the Medications, Allergies, Past Medical and Surgical History, and Social History in the Epic system.    History reviewed. No pertinent past medical history.    History reviewed. No pertinent surgical history.    Family History   Problem Relation Age of Onset     Substance Abuse Other      Substance Abuse Paternal Uncle        Social History   Substance Use Topics     Smoking status: Current Every Day Smoker     Packs/day: 0.25     Types: Cigarettes     Start date: 10/27/2010     Last attempt to quit: 10/9/2014     Smokeless tobacco: Current User      Comment: ecig     Alcohol use Yes      Comment: occasional       No current facility-administered medications for this encounter.      Current Outpatient Prescriptions   Medication     ARIPiprazole (ABILIFY) 10 MG tablet     hydrOXYzine (ATARAX) 25 MG tablet      No Known Allergies      Review of Systems  All other systems were reviewed and are negative    Physical Exam   BP: (!) 138/92  Pulse: 98  Temp: 96.6  F (35.9  C)  Resp: 16  SpO2: 98 %      Physical Exam   Constitutional: He is oriented to person, place, and " time. He appears well-developed and well-nourished.   HENT:   Head: Normocephalic and atraumatic.   Mouth/Throat: Oropharynx is clear and moist.   Eyes: EOM are normal. Pupils are equal, round, and reactive to light.   Neck: Normal range of motion. Neck supple. No tracheal deviation present. No thyromegaly present.   Cardiovascular: Normal rate, regular rhythm, normal heart sounds and intact distal pulses.  Exam reveals no gallop and no friction rub.    No murmur heard.  Pulmonary/Chest: Effort normal and breath sounds normal. He exhibits no tenderness.   Abdominal: Soft. Bowel sounds are normal. He exhibits no distension and no mass. There is no tenderness.   Musculoskeletal: He exhibits no edema or tenderness.   Neurological: He is alert and oriented to person, place, and time. No cranial nerve deficit. Coordination normal.   Skin: Skin is warm and dry. No rash noted.   Psychiatric: His mood appears anxious. His affect is blunt. His speech is tangential. He is actively hallucinating (Patient appears to be responding to internal stimuli). Thought content is paranoid and delusional. Cognition and memory are impaired. He expresses inappropriate judgment. He expresses no homicidal and no suicidal ideation.   When I entered the room, the patient has balanced 2 glasses of water on the edge of a chair and is staring blankly at them. He is inattentive.   Nursing note and vitals reviewed.      ED Course     ED Course     Procedures             Critical Care time:  none             Labs Ordered and Resulted from Time of ED Arrival Up to the Time of Departure from the ED - No data to display         Assessments & Plan (with Medical Decision Making)   Patient with a history of paranoid schizophrenia and cannabis use disorder.  He was recently admitted at [facility] due to psychosis and his medications were changed.  He previously was under a stay of commitment and the [facility] order but those are not currently active.   Since his discharge he is not compliant with medications, and his behavior has become increasingly more bizarre which prompted a call to [facility] who assessed him in his home and referred him to the emergency department.  The patient was also seen by the [facility] , please refer to their extensive note/evaluation which was reviewed with me and is documented in EPIC on 12/20/2017 for further details.  Today on exam, the patient was very withdrawn, exhibited tangential thoughts, appeared to be responding to internal stimuli, and was very disorganized.  He did deny any thoughts of harm to himself or others, but could not provide much reliable history due to what appeared to be a significant thought disorder.  Per his father his behavior has significantly changed in the last several days, and he has become increasingly more inappropriate and bizarre, and is no longer caring for himself appropriately.  Reportedly also may have been using drugs as well since hospital discharge.  At this point he appears to require inpatient hospitalization for stabilization.  I will place him on a 72 hour hold.  The patient is medically stable.    I have reviewed the nursing notes.    I have reviewed the findings, diagnosis, plan and need for follow up with the patient.    New Prescriptions    No medications on file       Final diagnoses:   Acute exacerbation of chronic paranoid schizophrenia (H)   Cannabis abuse       12/28/2017   [facility], [place], EMERGENCY DEPARTMENT     Mj Lino MD  12/28/17 [id number]       Mj Lino MD  12/28/17 [id number]

## 2017-12-28 NOTE — ED NOTES
ED to Behavioral Floor Handoff    SITUATION  Natalio Rodas is a 22 year old male who speaks English and lives in a home with family members The patient arrived in the ED by ambulance from home with a complaint of Paranoid (hearing voices, xbox reading his thoughts)  .The patient's current symptoms started/worsened 1 day ago and during this time the symptoms have increased.   In the ED, pt was diagnosed with   Final diagnoses:   Acute exacerbation of chronic paranoid schizophrenia (H)   Cannabis abuse        Initial vitals were: BP: (!) 138/92  Pulse: 98  Temp: 96.6  F (35.9  C)  Resp: 16Yes  SpO2: 98 %   --------  Is the patient diabetic? no    If yes, last blood glucose? --     If yes, was this treated in the ED? --  --------  Is the patient inebriated (ETOH) No or Impaired on other substances? No  MSSA done? N/A  Last MSSA score: --    Were withdrawal symptoms treated? N/A  Does the patient have a seizure history? No.   --------  Is the patient patient experiencing suicidal ideation? denies current or recent suicidal ideation     Homicidal ideation? denies current or recent homicidal ideation or behaviors.    Self-injurious behavior/urges? denies current or recent self injurious behavior or ideation.  ------  Was pt aggressive in the ED No  Was a code called No  Is the pt now cooperative? Yes  -------  Meds given in ED: Medications - No data to display   Family present during ED course? yes  Family currently present? Yes father at bedside    BACKGROUND  Does the patient have a cognitive impairment or developmental disability? No  Allergies: No Known Allergies.   Social demographics are   Social History     Social History     Marital status: Single     Spouse name: N/A     Number of children: N/A     Years of education: N/A     Social History Main Topics     Smoking status: Current Every Day Smoker     Packs/day: 0.25     Types: Cigarettes     Start date: 10/27/2010     Last attempt to quit: 10/9/2014      Smokeless tobacco: Current User      Comment: ecig     Alcohol use Yes      Comment: occasional     Drug use: Yes     Special: Marijuana     Sexual activity: No     Other Topics Concern     None     Social History Narrative        ASSESSMENT  Labs results   Labs Ordered and Resulted from Time of ED Arrival Up to the Time of Departure from the ED   DRUG ABUSE SCREEN 6 CHEM DEP URINE ([facility])      Imaging Studies: No results found for this or any previous visit (from the past 24 hour(s)).   Most recent vital signs BP (!) 138/92  Pulse 98  Temp 96.6  F (35.9  C) (Oral)  Resp 16  SpO2 98%   Abnormal labs/tests/findings requiring intervention:---   Pain control: pt had none  Nausea control: pt had none    RECOMMENDATION  Are any infection precautions needed (MRSA, VRE, etc.)? No   ---  Does the patient have mobility issues? independently.  ---  Is patient on 72 hour hold or commitment? Yes If on 72 hour hold, have hold and rights been given to patient? Yes  Are admitting orders written if after 10 p.m. ?N/A  Tasks needing to be completed: none     Anna Nguyen   [facility]-- [phone] [facility]   [phone] [facility]

## 2017-12-29 PROCEDURE — 12400007 ZZH R&B MH INTERMEDIATE UMMC

## 2017-12-29 PROCEDURE — 90853 GROUP PSYCHOTHERAPY: CPT

## 2017-12-29 PROCEDURE — 25000125 ZZHC RX 250: Performed by: NURSE PRACTITIONER

## 2017-12-29 PROCEDURE — 25000132 ZZH RX MED GY IP 250 OP 250 PS 637: Performed by: NURSE PRACTITIONER

## 2017-12-29 PROCEDURE — 99223 1ST HOSP IP/OBS HIGH 75: CPT | Mod: AI | Performed by: PSYCHIATRY & NEUROLOGY

## 2017-12-29 PROCEDURE — 25000132 ZZH RX MED GY IP 250 OP 250 PS 637: Performed by: PSYCHIATRY & NEUROLOGY

## 2017-12-29 RX ORDER — PALIPERIDONE 3 MG/1
3 TABLET, EXTENDED RELEASE ORAL AT BEDTIME
Status: DISCONTINUED | OUTPATIENT
Start: 2017-12-29 | End: 2018-01-03 | Stop reason: HOSPADM

## 2017-12-29 RX ORDER — NICOTINE 21 MG/24HR
1 PATCH, TRANSDERMAL 24 HOURS TRANSDERMAL DAILY
Status: DISCONTINUED | OUTPATIENT
Start: 2017-12-29 | End: 2018-01-03 | Stop reason: HOSPADM

## 2017-12-29 RX ORDER — NICOTINE 21 MG/24HR
1 PATCH, TRANSDERMAL 24 HOURS TRANSDERMAL DAILY
Status: DISCONTINUED | OUTPATIENT
Start: 2017-12-29 | End: 2017-12-29

## 2017-12-29 RX ADMIN — PALIPERIDONE 3 MG: 3 TABLET, EXTENDED RELEASE ORAL at 20:07

## 2017-12-29 RX ADMIN — Medication 5 MG: at 20:43

## 2017-12-29 RX ADMIN — NICOTINE POLACRILEX 2 MG: 2 GUM, CHEWING ORAL at 22:00

## 2017-12-29 RX ADMIN — OLANZAPINE 10 MG: 10 INJECTION, POWDER, LYOPHILIZED, FOR SOLUTION INTRAMUSCULAR at 05:33

## 2017-12-29 RX ADMIN — NICOTINE 1 PATCH: 14 PATCH, EXTENDED RELEASE TRANSDERMAL at 00:51

## 2017-12-29 RX ADMIN — NICOTINE POLACRILEX 2 MG: 2 GUM, CHEWING ORAL at 13:29

## 2017-12-29 RX ADMIN — NICOTINE POLACRILEX 2 MG: 2 GUM, CHEWING ORAL at 18:33

## 2017-12-29 ASSESSMENT — ACTIVITIES OF DAILY LIVING (ADL)
GROOMING: INDEPENDENT
ORAL_HYGIENE: INDEPENDENT
DRESS: INDEPENDENT

## 2017-12-29 NOTE — PLAN OF CARE
"Problem: Psychotic Symptoms  Goal: Social and Therapeutic (Psychotic Symptoms)  Signs and symptoms of listed problems will be absent or manageable.     INITIAL OT NOTE    Pt attended 0.5 out of 3.0 OT groups offered. Pt was given and completed a written self-assessment form. OT staff reviewed with pt and explained the value of having them involved in their treatment plan, and provided options to meet current needs/self-identified goals. Pt wrote his name on the form, but said \"I can't focus,\" and declined completing remainder of assessment form. Plan to re-administer form next week.    Pt actively participated in occupational therapy clinic. Pt was able to ask for assistance as needed, and initiate a goal-directed task. Pt selected a woodworking task, and wrote \"hurt locker\" on top of it when adding detail to his project. Appeared comfortable interacting with peers. Flat affect.        "

## 2017-12-29 NOTE — PLAN OF CARE
Admission Note    Pt was admitted to station 10 under a 72-hr hold because of auditory hallucinations and stating that his Xbox was reading his thoughts (per ED report). Pt has a medical history of psychosis, ODD, schizophrenia and cannabis use disorder. Pt reported in ED note and to writer that he had been trying to wean himself off his Abilify by cutting up his meds into micro doses because he thinks it causes him to have headaches. Pt denies SI.    Pt Utox was negative. Pt stated that once in a while he uses marijuana to help him calm down. Hepatic and Lipid Panel has been ordered. CBC w/diff, BMP, and TSH has been done recently at [facility].    Pt was cooperative with search and interview. Initially was irritable upon arrival. Took shower and ate dinner, and was able to calm himself down later.

## 2017-12-29 NOTE — PROGRESS NOTES
"Pt had a fair shift. Pt slept through the morning and woke up around noon. Upon awakening, Pt appeared irritable and slightly agitated. Pt began throwing things inside of his room, including his linens and a deck of cards. Pt was approached by nurse and this writer to offer medications. Pt verbally refused and when the nurse extended her hand with the medications, Pt swatted the meds out of her hand onto the floor. The Pt would often interrupt his nurse when she was attempting to talk to him and he would pound his chest with his hands while she was talking. Pt stated \"when I came into this hospital I said I was not going to take neuroleptics!\" Pt explained that he is not mad at staff and that he is upset because he was forced to take medications and go to seclusion last night.  Since Pt refused medications, this writer proposed that the Pt remain in his room and take time to calm himself down. Pt agreed to this. Pt was out in the milieu shortly afterwards and appeared calm and no negative behaviors were observed for the rest of the day.  "

## 2017-12-29 NOTE — PROGRESS NOTES
[facility]  [address] COURT--MENTAL HEALTH DIVISION  [facility] JUDICIAL DISTRICT    EXAMINER S STATEMENT IN SUPPORT  OF PETITION FOR COMMITMENT   In the matter of:  Natalio Rodas Court File #:     I am a licensed physician or licensed psychologist, and I am knowledgeable, trained and practicing in the diagnosis and treatment of:   XMental Illness Mental Retardation XChemical Dependence  I have examined the above-named person within the last fifteen days, on _December 29, 2017, and the results of the examination are stated below:    Behavioral evidence to support commitment:  Delusional thoughts about artificial intelligence communicating with him, paranoia about other people constantly pacing the biggs worried that someone else will harm someone else, not following through with previous treatment planning, discontinuing medications, believing that substances will cure his conditions.  Diagnostic impressions and conclusions:   Schizophrenia  Cannabis use disorder severe  Tobacco use disorder  Possible history of traumatic brain injury  History of ADHD  Recommended treatment:   Antipsychotic medications  I am of the opinion that the above-named person is in need of treatment and should be committed to a treatment facility.  Will this person need neuroleptic medications? yes  Does this person have sufficient awareness of their situation  and understanding of treatment with neuroleptics to make  this decision for themselves? no  **If you answered No, then you must provide either a [facility] petition or a Request for a Substitute Decision Maker form.     Reasons for this opinion:   Date: December 29, 2017    Name: Gregor Mckenna MD  Signature: ____________________  Address: [address] [place] [id number]  Phone:

## 2017-12-29 NOTE — PROGRESS NOTES
At 3:00 pm, Writer met with Dr. Owens to discuss potential petition for commitment. Due to the holiday, it was explained that we do not have enough time to file a petition as [facility] would not have enough time to complete their assessment of Pt in time. Pt's hold expires 1/3/18 @12:50 pm

## 2017-12-29 NOTE — PROGRESS NOTES
Pt is unable to complete Personal Plan of Care at this time due to increased paranoia and psychosis.

## 2017-12-29 NOTE — PROGRESS NOTES
Attempted to process pt out of seclusion with expectation that he stays in his room and get some sleep. Pt playing possum, but eventually rolls over after about 2 minutes and closes his eyes without responding to writer. Will continue seclusion at this time.

## 2017-12-29 NOTE — PROGRESS NOTES
Pt has appeared extremely paranoid and guarded. Affect is tense, has been pacing and staying out of his room since the start of the shift. Pt adamantly refuses to take any PRN medication to help him sleep.

## 2017-12-29 NOTE — PROGRESS NOTES
NEUROLEPTIC MEDICATION NOTE FOR [facility] PROCEEDINGS  Please address the following issues in legible writing or typewritten.  Patient s name: ____Natalio Rodas__________  FOR EMERGENCY USE ONLY:  If a medical emergency has been declared previous to this note, please describe:   a. The basis for the emergency: ___NA______________   b. The neuroleptic medications provided: ___NA________   1. Briefly describe the patient s clinical condition that supports a recommendation for the treatment with neuroleptic medication:   Delusional thoughts about artificial intelligence communicating with him, paranoia about other people constantly pacing the biggs worried that someone else will harm someone else, not following through with previous treatment planning, discontinuing medications, believing that substances will cure his conditions.  2. List the working diagnoses of the condition(s) for which neuroleptic medication is recommended:  Schizophrenia  3. Is neuroleptic medication the treatment of choice in prevailing medical practice? XYes No  4. Treatment options other than neuroleptics that alone effectively treat the illness:  None other than ECT   5. Medication ordered or proposed: (up to 4 medications, no more than two to be used simultaneously unless meds are being changed or emergency exists, unless otherwise specified): ____Jarvis:   quetiapine up to 1400 mg orally    paliperidone oral up to 12 mg daily and MILLIGAN up to 819 mg monthly   haloperadol oral and IM up to 100 mg daily or 4 mg IV and up to MILLIGAN 450 mg every 4 weeks  fluphenazine oral and IM up to 20 mg daily and MILLIGAN up to 100 mg every 3 weeks    ___________  OR: unusual circumstances exist under which physician requests authorization to use any FDA approved neuroleptic. Identify unusual circumstances and describe proposed course of treatment.   6. If only oral use of neuroleptic medication is proposed, will forced administration by nasogastric tube possibly  occur?  Yes XNo  7. Document the proposed course of treatment with neuroleptic medication, i.e., how the medication will be prescribed, monitored, and adjusted:   Prescribed by psychiatric team and monitored by nursing staff daily and adjusted as needed.  8. If the patient has a known record with neuroleptics, please summarize his/her history regarding risks/benefits and whether this is predictive of expected response:   Previous medication trials include olanzapine which potentially had a good response and aripiprazole was too brief to say if it was beneficial.  He described a potential headache with the aripiprazole previously.  9. List the possible risks and side effects, and what can be done should they occur. Include any specific risks associated with the patient s age/gender/ethnic identity or medical condition. Does this patient have any medical conditions that could be exacerbated by neuroleptics?   No medical conditions that would be exacerbated by antipsychotic medications.  Possible side effects could include but are not limited to include abnormal movements, acute dystonia, weight gain, cholesterol problems, blood sugar problems, neuroleptic malignant syndrome, headache, GI upset, hemodynamic changes, pancytopenia, etc  10. Indicate likely benefits and outcomes for the patient after treatment with neuroleptic medication, including prognosis if neuroleptic medication is not administered (even if other forms of therapy are utilized):   Previous benefit documented with olanzapine however he did not continue this medication.  11. Does this patient have tardive dyskinesia? Yes XNo  12. For a patient to have the capacity to decide about the use of neuroleptics, the answers to a), b) and c) below must be answered  yes.  If one or more of these questions is answered  no,  the court may find that the patient lacks this capacity, and the court may make the decision after a hearing.  a) Does the patient demonstrate  awareness of the nature of the patient s situation, including the reasons for hospitalization and possible consequences of refusing treatment with neuroleptic medication? Yes XNo  b) Does the patient demonstrate an understanding of treatment with neuroleptic medication including the risks, benefits, and alternatives? Yes XNo  c) Does the patient communicate verbally or nonverbally a clear choice regarding treatment that is reasoned and not delusional (even though the choice may not be in the patient s best interest)? Yes XNo  d) If the patient objects, is the objection based on family, community, moral, Scientologist, and social values? Yes XNo. If yes, briefly describe:   Document specific efforts that have been made to assist patient in understanding the risks and benefits.   We had a discussion about his previous medication trials in his responses related to them.  He was not receptive to this information and reported he would like to continue using substances and believes those are more helpful than antipsychotic medications.  Continued efforts to educate him will be had during hospital stay once he improves in his current clinical status.  13. If the patient is consenting to the medication, why is a court order necessary? If the patient has history of  unreliable consent,  please summarize.   Patient is refusing medications  14. Is the proposed treatment experimental or part of a research study? Yes X No  15. In this patient s case, do the benefits of the treatment outweigh the risks? Please summarize:   He has a previous history of improvement on antipsychotic medications he has continued to have employment and has future aspirations but could be in jeopardy if he continues to have psychotic symptoms and use substances.  The above statements represent my opinion within a reasonable degree of medical certainty.  Physician s or Other Provider s Signature: ____________  Date: _December 29, 2017__ Printed Provider s  Name: Gregor Mckenna MD
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
## 2017-12-29 NOTE — PROGRESS NOTES
12/29/17 0540   Justification   Clinical Justification All   Pt had been up all night pacing, acting paranoid and not sleeping. On a couple instances was loud and disruptive but easily redirected. About 0520, pt started power pacing, jumping over furniture a couple times and getting really disruptive. This time pt was difficult to redirect and decision was made for seclusion and forced medications.

## 2017-12-29 NOTE — PROGRESS NOTES
12/29/17 0715   Debriefing   Debriefing DO   Pt processed out of seclusion, agrees to stay in room and get some much needed sleep. Denies any adverse effects from been placed in seclusion.

## 2017-12-29 NOTE — PROGRESS NOTES
[facility] DISTRICT COURT    PROBATE/MENTAL HEALTH DIVISION  [facility]    In the matter of: WRITTEN REQUEST FOR AUTHORIZATION   TO IMPOSE TREATMENT AND REQUEST  ____Natalio Rodas_______ FOR HEARING  Respondent.    I, ____Gregor Mckenna____, to the best of my knowledge, information, and belief respectfully represent:  1. I am a member of the treatment team for the respondent.  2. Respondent was born on __________1995__________.  3. Respondent is presently receiving care and treatment at: __[facility]____, located at __[address] [place] [address]____________.  4. Diagnostic studies performed by ____Gregor Mckenna_____, M.D. on respondent appear to indicate that respondent suffers from ____Schizophrenia_____.  5. Dr. ___Gregor Mckenna_____ has determined neuroleptic medication(s) to be medically necessary.  6. I have determined that the benefits of administration of the proposed treatment to the respondent outweigh the risks and therefore this procedure is reasonable.  7. Respondent s written informed consent to the administration of the above procedure has not been obtained because of incompetency to make a rational decision regarding the proposed treatment.  8. The objective of the proposed treatment is to treat the symptoms of the mental illness that interfere with the respondent s ability to function.  9. Petitioner requests that a hearing be scheduled, and that authorization to administer the proposed treatment be granted according to law.  DATED:___December 29, 2017_____ _________________________________________   Physician s Signature     ______Gregor Mckenna__________   Print Name

## 2017-12-29 NOTE — PLAN OF CARE
Problem: Patient Care Overview  Goal: Team Discussion  Team Plan:   BEHAVIORAL TEAM DISCUSSION    Participants: Dr. Thierno Oneal MD, Dayami Xiao [facility], and Ange Medina RN.   Progress: Initial behavioral team discussion.   Continued Stay Criteria/Rationale: Pt was admitted on a 72 HH (expires 1/3/17 at 12:50 pm) due to increased psychosis and paranoia.   Medical/Physical: See medical consult.   Precautions:   Behavioral Orders   Procedures     Code 1 - Restrict to Unit     Elopement precautions     Routine Programming     As clinically indicated     Single Room     Active Psychosis and Paranoia     Status 15     Every 15 minutes.     Plan: The plan is to assess the patient for mental health and medication needs.  The patient will be prescribed medications to treat the identified symptoms.  Upon discharge the patient will be referred to services as appropriate based on the assessment.  Rationale for change in precautions or plan: Initial behavioral team.

## 2017-12-30 PROCEDURE — 12400007 ZZH R&B MH INTERMEDIATE UMMC

## 2017-12-30 PROCEDURE — 25000125 ZZHC RX 250: Performed by: NURSE PRACTITIONER

## 2017-12-30 PROCEDURE — 25000132 ZZH RX MED GY IP 250 OP 250 PS 637: Performed by: NURSE PRACTITIONER

## 2017-12-30 PROCEDURE — 25000132 ZZH RX MED GY IP 250 OP 250 PS 637: Performed by: PSYCHIATRY & NEUROLOGY

## 2017-12-30 RX ADMIN — NICOTINE POLACRILEX 2 MG: 2 GUM, CHEWING ORAL at 13:43

## 2017-12-30 RX ADMIN — OLANZAPINE 10 MG: 10 TABLET, FILM COATED ORAL at 00:21

## 2017-12-30 RX ADMIN — OLANZAPINE 10 MG: 10 INJECTION, POWDER, LYOPHILIZED, FOR SOLUTION INTRAMUSCULAR at 17:58

## 2017-12-30 RX ADMIN — Medication 10 MG: at 21:09

## 2017-12-30 RX ADMIN — NICOTINE 1 PATCH: 14 PATCH, EXTENDED RELEASE TRANSDERMAL at 20:39

## 2017-12-30 RX ADMIN — Medication 5 MG: at 00:22

## 2017-12-30 ASSESSMENT — ACTIVITIES OF DAILY LIVING (ADL)
GROOMING: INDEPENDENT
ORAL_HYGIENE: PROMPTS
ORAL_HYGIENE: INDEPENDENT
DRESS: PROMPTS
LAUNDRY: WITH SUPERVISION
DRESS: STREET CLOTHES
GROOMING: PROMPTS

## 2017-12-30 NOTE — PLAN OF CARE
"Problem: Psychotic Symptoms  Goal: Psychotic Symptoms  Signs and symptoms of listed problems will be absent or manageable.   Outcome: Improving  Slept until about 10:30, came to lounge to eat breakfast, did not respond verbally to this staff, or make eye contact. Appears to be attending to auditory hallucinations as has prolonged delayed responses, stares at the table.  Spoke to a peer about being cold and wanting a sweatshirt, but was unable to state what size or make a decision when presented with a large and a small.  Stood up while eating breakfast, put plate on plate cover and was spinning this around.  Has been observed rapping on chest with closed fist as if to a song.  Is presently walking in [place]gs with a plastic spoon and stooping over in [place] as if to scoop something up with spoon.  When walks by desk stops to touch menus or a book on the desk.  Declined Nicotine patch by shaking head no.  When staff offered Zyprexa replied \"Hell no I'm not taking f__king Zyprexa.\"  When redirected from swearing in a calm voice said, \"no, I'm not taking Zyprexa\".  After this was able to ask staff to take shower and for toiletries.        "

## 2017-12-30 NOTE — PROGRESS NOTES
"Natalio (Alexi) was somewhat intrusive with peers (poor space and conversational boundaries) this evening and frequently made odd/unsolicited comments that seemed to have no connection to anything. Pt stating to a male peer: \"I tucked my pockets back in for you. There you go.\" Pt looked at the 'Run Risk' sign on the unit door and stated \"Run risk. Risk is just a game.\" Pt appears to be internally preoccupied as evidenced by his delayed response to questions and at times intense eye contact with staff. Pt arranging and leaving small items (packets of pepper, small food crumbs, crumpled paper etc.) on specific part of staff desk. Pt observed talking to self and stopping randomly in hallway and posing or gesturing. Denies all mental health symptoms and declines check in with this writer. Pt had trouble retreating to his room once the lounge was closed. At this time, pt continuously approached desk and stood still, making comments that did not make sense and staring into space. Pt then began moving chairs into the hallway.      12/29/17 1500   Behavioral Health   Hallucinations appears responding   Thinking distractable;delusional;poor concentration   Orientation person: oriented;place: oriented   Memory baseline memory   Insight poor   Judgement impaired   Eye Contact at examiner  (intense at times)   Affect blunted, flat;tense   Mood mood is calm;irritable   Physical Appearance/Attire attire appropriate to age and situation   Hygiene well groomed   Suicidality other (see comments)  (denies)   1. Wish to be Dead No   2. Non-Specific Active Suicidal Thoughts  No   Self Injury other (see comment)  (denies)   Elopement Statements about wanting to leave   Activity restless;other (see comment)  (visible in milieu, passively social)   Speech circumstantial   Medication Sensitivity no stated side effects;no observed side effects   Psychomotor / Gait balanced;steady   Safety   Elopement status 15   Occupational Therapy   Type " of Intervention structured groups   Response Participates   Hours 0.5   Psycho Education   Type of Intervention 1:1 intervention   Response refuses   Activities of Daily Living   Hygiene/Grooming independent   Oral Hygiene independent   Dress independent   Room Organization independent   Behavioral Health Interventions   Psychotic Symptoms maintain safety precautions;monitor need to revise level of observation;maintain safe secure environment;redirection of intrusive behaviors;redirection of aggressive behaviors   Social and Therapeutic Interventions (Psychotic Symptoms) encourage socialization with peers;encourage effective boundaries with peers;encourage participation in therapeutic groups and milieu activities

## 2017-12-31 LAB
ALBUMIN SERPL-MCNC: 3.8 G/DL (ref 3.4–5)
ALP SERPL-CCNC: 101 U/L (ref 40–150)
ALT SERPL W P-5'-P-CCNC: 38 U/L (ref 0–70)
AST SERPL W P-5'-P-CCNC: 34 U/L (ref 0–45)
BILIRUB DIRECT SERPL-MCNC: 0.1 MG/DL (ref 0–0.2)
BILIRUB SERPL-MCNC: 0.6 MG/DL (ref 0.2–1.3)
CHOLEST SERPL-MCNC: 175 MG/DL
HDLC SERPL-MCNC: 86 MG/DL
LDLC SERPL CALC-MCNC: 79 MG/DL
NONHDLC SERPL-MCNC: 89 MG/DL
PROT SERPL-MCNC: 8 G/DL (ref 6.8–8.8)
TRIGL SERPL-MCNC: 50 MG/DL

## 2017-12-31 PROCEDURE — 25000132 ZZH RX MED GY IP 250 OP 250 PS 637: Performed by: PSYCHIATRY & NEUROLOGY

## 2017-12-31 PROCEDURE — 12400007 ZZH R&B MH INTERMEDIATE UMMC

## 2017-12-31 PROCEDURE — 25000132 ZZH RX MED GY IP 250 OP 250 PS 637: Performed by: NURSE PRACTITIONER

## 2017-12-31 PROCEDURE — 36415 COLL VENOUS BLD VENIPUNCTURE: CPT | Performed by: PSYCHIATRY & NEUROLOGY

## 2017-12-31 PROCEDURE — 80061 LIPID PANEL: CPT | Performed by: PSYCHIATRY & NEUROLOGY

## 2017-12-31 PROCEDURE — 80076 HEPATIC FUNCTION PANEL: CPT | Performed by: PSYCHIATRY & NEUROLOGY

## 2017-12-31 RX ADMIN — OLANZAPINE 10 MG: 10 TABLET, FILM COATED ORAL at 19:01

## 2017-12-31 RX ADMIN — OLANZAPINE 10 MG: 10 TABLET, FILM COATED ORAL at 05:00

## 2017-12-31 RX ADMIN — NICOTINE POLACRILEX 2 MG: 2 GUM, CHEWING ORAL at 10:50

## 2017-12-31 RX ADMIN — NICOTINE POLACRILEX 2 MG: 2 GUM, CHEWING ORAL at 15:21

## 2017-12-31 RX ADMIN — Medication 10 MG: at 19:47

## 2017-12-31 ASSESSMENT — ACTIVITIES OF DAILY LIVING (ADL)
DRESS: INDEPENDENT
ORAL_HYGIENE: INDEPENDENT
HYGIENE/GROOMING: INDEPENDENT

## 2017-12-31 NOTE — PROGRESS NOTES
12/30/17 2055   Behavioral Health   Hallucinations appears responding   Thinking poor concentration;paranoid;delusional;distractable;confused   Orientation person, disoriented;place, disoriented;date, disoriented   Memory baseline memory   Insight poor   Judgement impaired   Eye Contact at examiner   Affect irritable;angry;tense   Mood depressed;irritable   Physical Appearance/Attire attire appropriate to age and situation   Hygiene neglected grooming - unclean body, hair, teeth   Suicidality thoughts only   1. Wish to be Dead No   2. Non-Specific Active Suicidal Thoughts  No   Self Injury (Nothing was obsarved or stated )   Elopement (Nothing was obsarved or stated )   Activity withdrawn;isolative   Speech clear   Medication Sensitivity no stated side effects   Psychomotor / Gait balanced   Psycho Education   Type of Intervention other (see comment)   Response observes from a distance   Hours 1.5   Activities of Daily Living   Hygiene/Grooming independent   Oral Hygiene independent   Dress street clothes   Room Organization independent   Activity   Activity Assistance Provided independent   Behavioral Health Interventions   Psychotic Symptoms maintain safety precautions;monitor need to revise level of observation;maintain safe secure environment;encourage participation / independence with adls   Social and Therapeutic Interventions (Psychotic Symptoms) encourage participation in therapeutic groups and milieu activities;encourage effective boundaries with peers;encourage socialization with peers     Pt. Remains psychotic. Pt. Had difficult evening and hard to redirect him throughout the most part of the shift.  Pt. Appeared irritable, agitated and confused. When asked questions the patient was slow to respond.  Pt. Was pacing in hallway, kicking a pencils, and slamming his room door and phones on the wall. Pt. Was bringing small items like spones, a pencils, newspapers and food and placed on the staff's desk.   "Pt. Pushed a chair in [facility] and the chair fall down on its back in hallway. Then the staff members approached the patient and told him to go back to his room. Pt. Eventually went back to his room and the RN offer him PRN, but the patient refused to take the medication. Pt continue saying \" you guys are giving me the medications to put me to sleep\". Then the RN staff member told the patient that he have two choices which were to either take oral medication or he will be forced to take the medication as IM. Pt. Preferred the IM instead of oral medication. Then code green paged. When the staff arrived the patient was sitting in bathroom. Staff told the patient to lay down on his bed for the shot and patient was agreed to lay down after refusing for a while. After he took the medication patient was fairly calm, cooperative and redirectable.   "

## 2017-12-31 NOTE — PROGRESS NOTES
"Pt appeared agitated and impulsive, pacing in hallway, tearing papers off bulletin board, throwing pencils down hallway, taking socks off and throwing them down on floor. Required multiple redirections. Then pt forcibly pushed lounge chair over into hallway and the chair fell over on its back. Pt was redirected to his room where he did go. However, when approached by staff and offered Zyprexa 10 mg po PRN, he appeared even more agitated, yelling \"I'm not taking that! You just want to give that to me to make me fall asleep!\" He continued to have psychomotor agitation, impulsively tearing sheets off bed, etc. Code green paged. Pt approached with Zyprexa 10 mg IM PRN. He was sitting on bathroom floor brushing his teeth. The then threw his toothbrush into toilet. After several prompts, he did come out of bathroom and lay on bed. IM given without further incident.  "

## 2017-12-31 NOTE — PROGRESS NOTES
12/31/17 1503   Behavioral Health   Hallucinations appears responding   Thinking delusional;poor concentration;distractable   Orientation person: oriented;place: oriented;time: oriented;date: oriented   Memory baseline memory   Insight poor   Judgement impaired   Eye Contact at examiner   Affect blunted, flat   Mood mood is calm   Physical Appearance/Attire attire appropriate to age and situation   Hygiene well groomed   Suicidality other (see comments)  (denies currently)   Elopement Distress about legal situation (holds for mental health or criminal)   Activity withdrawn;isolative   Speech clear;coherent   Medication Sensitivity no stated side effects   Psychomotor / Gait balanced;steady     Pt had an unremarkable shift. Mood was calm with a blunted affect. No signs of irritability or agitation this shift. Pt did have brief moment this shift when he was angry talking to his father on the phone but otherwise was calm. No SI/SIB indicated. Pt denied all mental health symptoms and/or did not respond to some of the questions asked by this writer. Pt does appear responding to internal stimuli, due to delayed responses and making nonsensical statements. Pt's mood appeared brighter this shift and per staff observation Pt was more social with peers today. No behavioral issues. Pt is maintaining personal hygiene (took shower today).

## 2018-01-01 PROCEDURE — 90853 GROUP PSYCHOTHERAPY: CPT

## 2018-01-01 PROCEDURE — 25000132 ZZH RX MED GY IP 250 OP 250 PS 637: Performed by: NURSE PRACTITIONER

## 2018-01-01 PROCEDURE — 25000132 ZZH RX MED GY IP 250 OP 250 PS 637: Performed by: PSYCHIATRY & NEUROLOGY

## 2018-01-01 PROCEDURE — 12400007 ZZH R&B MH INTERMEDIATE UMMC

## 2018-01-01 RX ADMIN — Medication 10 MG: at 22:00

## 2018-01-01 RX ADMIN — NICOTINE 1 PATCH: 14 PATCH, EXTENDED RELEASE TRANSDERMAL at 07:58

## 2018-01-01 RX ADMIN — NICOTINE POLACRILEX 4 MG: 2 GUM, CHEWING ORAL at 17:32

## 2018-01-01 ASSESSMENT — ACTIVITIES OF DAILY LIVING (ADL)
ORAL_HYGIENE: INDEPENDENT
DRESS: SCRUBS (BEHAVIORAL HEALTH)
LAUNDRY: WITH SUPERVISION
DRESS: INDEPENDENT
GROOMING: INDEPENDENT
ORAL_HYGIENE: INDEPENDENT
GROOMING: INDEPENDENT
LAUNDRY: WITH SUPERVISION

## 2018-01-01 NOTE — PLAN OF CARE
"Problem: Overarching Goals (Adult)  Goal: Adheres to Safety Considerations for Self and Others  Outcome: No Change  Patient denies suicidal ideation, plan, intent at this time. Patient had thrown a chapstick across the lounge this morning. Patient was redirectable and has not had these behaviors since. Patient is very concerned about taking neuroleptic medications and states he has nightmares about side effects he has experienced in the past.   Goal: Optimized Coping Skills in Response to Life Stressors  Outcome: Improving  Patient uses music and exercise as coping skills.     Problem: Psychotic Symptoms  Goal: Psychotic Symptoms  Signs and symptoms of listed problems will be absent or manageable.   Outcome: No Change  Patient has been responding to internal stimuli. When speaking he appears to have some thought blocking and is easily distractable. Patient states he has trouble concentrating. Started rambling about a  farm he had seen while on a train to [place] that would be the perfect place for Jose to play his games and send us all. Patient stopped himself and stated \"I know that sounded insane so I beat it out of my head.\" States he has \"crazy\" thoughts that he is able to control.       "

## 2018-01-01 NOTE — PROGRESS NOTES
"Patient reports feeling \"okay\" but overall doing worse than previous days. Patient denies SI/SIB or psychotic symptoms but appeared to respond to internal stimuli throughout the shift. Patient states he does not want to take Zyprexa because it gives him muscle spasms and pain, particularly in his legs. Patient was visible in the milieu but withdrawn with odd, incongruent affect. Patient would frequently stare into space or suddenly gesture. Patient states he hopes to discharge on Tuesday so that he does not lose his job.     12/31/17 2000   Behavioral Health   Hallucinations appears responding   Thinking distractable;delusional;poor concentration   Orientation person: oriented;place: oriented;date: oriented;time: oriented   Memory baseline memory   Insight poor   Judgement impaired   Eye Contact at examiner   Affect blunted, flat   Mood mood is calm   Physical Appearance/Attire appears stated age;attire appropriate to age and situation   Hygiene well groomed   Suicidality other (see comments)  (Denies)   1. Wish to be Dead No   2. Non-Specific Active Suicidal Thoughts  No   Self Injury other (see comment)  (Denies)   Elopement Statements about wanting to leave   Activity withdrawn   Speech clear;coherent   Medication Sensitivity no stated side effects;no observed side effects   Psychomotor / Gait balanced;steady   Activities of Daily Living   Hygiene/Grooming independent   Oral Hygiene independent   Dress independent   Room Organization independent     "

## 2018-01-01 NOTE — PLAN OF CARE
Problem: Psychotic Symptoms  Intervention: Social and Therapeutic Interv (Psychotic Symptoms)    Pt attended the morning OT clinic group, chose to paint a small paper mache box.  States he spends most of his time playing video games [place] people all over the [place].  All conversation focused on the video games - the teams he is on, how members protect and communicate each other and share rooms/strategies, etc.

## 2018-01-02 PROCEDURE — 25000132 ZZH RX MED GY IP 250 OP 250 PS 637: Performed by: NURSE PRACTITIONER

## 2018-01-02 PROCEDURE — 12400007 ZZH R&B MH INTERMEDIATE UMMC

## 2018-01-02 PROCEDURE — 90853 GROUP PSYCHOTHERAPY: CPT

## 2018-01-02 PROCEDURE — 97150 GROUP THERAPEUTIC PROCEDURES: CPT | Mod: GO

## 2018-01-02 PROCEDURE — 99232 SBSQ HOSP IP/OBS MODERATE 35: CPT | Performed by: PSYCHIATRY & NEUROLOGY

## 2018-01-02 RX ADMIN — NICOTINE POLACRILEX 4 MG: 2 GUM, CHEWING ORAL at 14:37

## 2018-01-02 ASSESSMENT — ACTIVITIES OF DAILY LIVING (ADL)
DRESS: INDEPENDENT
GROOMING: INDEPENDENT
ORAL_HYGIENE: INDEPENDENT

## 2018-01-02 NOTE — PROGRESS NOTES
"[facility], [place]   Psychiatric Progress Note  Natalio Rodas  [id number]  20    Chief Complaint: Continued medical care          Interim History:   The patient's care was discussed with the treatment team during the daily team meeting and/or staff's chart notes were reviewed.  Staff report patient refusing antipsychotic medications rambling trouble with concentration and appears psychotic.    Natalio reports that he would like to leave the hospital does not want to take neuroleptic medications and describes a previous neck injury and that cannabis use helps him.  He is not interested in taking any antipsychotic medications and has continued to refuse them.  He is just not comfortable taking them and says they give him side effects but will not give clearly descriptive side effects he is experiencing.  He feels embarrassed and though he is having chills however he is not currently taking any medications.  Denies any hallucinations or sleep problems or any hopelessness or helplessness or thoughts of harming himself or others, denies any paranoia or anxiety and would like to go see his outpatient physician.  He gave me permission to call his father Robert    At [phone] and left a message that unfortunately we missed the window to file for commitment and that should he have further concerns after he is discharged after the 72 hour hold has  he should bring him back to the hospital and request that they file for commitment immediately.         Medications:       nicotine   Transdermal Q8H     nicotine   Transdermal Daily     nicotine  1 patch Transdermal Daily     paliperidone  3 mg Oral At Bedtime          Allergies:   No Known Allergies       Labs:   No results found for this or any previous visit (from the past 24 hour(s)).       Psychiatric Examination:     /87  Pulse 106  Temp 97.1  F (36.2  C) (Tympanic)  Resp 16  Ht 1.778 m (5' 10\")  Wt 60.7 kg (133 " lb 12.8 oz)  SpO2 97%  BMI 19.2 kg/m2  Weight is 133 lbs 12.8 oz  Body mass index is 19.2 kg/(m^2).                Sitting Orthostatic BP: 124/82      Sitting Orthostatic Pulse: 78 bpm      Standing Orthostatic BP: 131/87      Standing Orthostatic Pulse: 76 bpm       Weight over time:  Vitals:    01/02/18 0807   Weight: 60.7 kg (133 lb 12.8 oz)       Orthostatic Vitals       Most Recent      Sitting Orthostatic /82 01/02 0807    Sitting Orthostatic Pulse (bpm) 78 01/02 0807    Standing Orthostatic /87 01/02 0807    Standing Orthostatic Pulse (bpm) 76 01/02 0807            Natalio is a 22-year-old male wearing a T-shirt and has curly hair.  His speech is of an appropriate rate and tone in his language is intact.  His behavior is somewhat odd that he appears to be distracted at times.  His affect is subdued though he does smile at times.  His mood he describes as better.  His thought content consists of the above without thoughts of harming himself or others and potential delusions as above.  His thought process includes tangents and possible looseness of association.  He could potentially have abnormal perceptions.  He is alert and aware of his current location and circumstances.  His attention and concentration appears adequate.  His cognition and fund of knowledge appears normal.  Long-term/short-term/remote memory appears intact.  His insight and judgment are both impaired.         Precautions:     Behavioral Orders   Procedures     Code 1 - Restrict to Unit     Elopement precautions     Routine Programming     As clinically indicated     Single Room     Active Psychosis and Paranoia     Status 15     Every 15 minutes.          DIagnoses:     Schizophrenia  Cannabis use disorder severe  Tobacco use disorder  Possible history of traumatic brain injury  History of ADHD         Assessment & Plan:     Natalio continues to be delusional and possibly having psychotic symptoms unfortunately we were not  able to file for commitment related to the holiday and [facility] not coming out to assess him.  He took 1 dose of the paliperidone and did not seem to have any side effects though he complained of vague side effects.  Urged his father to bring him back to the hospital after he is discharged if he is feeling as though he is not doing well in the outpatient setting.    Continue 72 hour hold expires tomorrow too late to file when paperwork was completed    The risks, benefits, alternatives and side effects have been discussed and are understood by the patient and other caregivers.      Gregor Mckenna  [facility] Psychiat[facility]      The following document has been created with voice recognition software and may contain unintentional word substitutions.

## 2018-01-02 NOTE — PLAN OF CARE
"Problem: Psychotic Symptoms  Goal: Social and Therapeutic (Psychotic Symptoms)  Signs and symptoms of listed problems will be absent or manageable.     Pt attended 2.5 out of 3.0 OT groups offered. Pt actively participated in occupational therapy clinic. Pt was able to ask for assistance as needed, and independently return to a creative expression task. Pt demonstrated good focus, planning, and attention to detail. Pt actively participated in a mental health management group revolving around goal setting. Pt identified a long-term goal about \"health\", as well as 5 short-term goals needed in order to achieve the long-term goal, including \"routine, diet, golf, bowling, and meditation/medication.\" Pt appeared comfortable sharing goals and ideas with group members. Flat affect.    OT Self-Assessment  Pt was given and completed a written self-assessment form. OT staff reviewed with pt and explained the value of having them involved in their treatment plan, and provided options to meet current needs/self-identified goals.     In commenting on recent events/stress, pt wrote \"net neutrality.\"  Pt was unable to identify any goals.  Pt identified exercise, setting limits, and spirituality as his coping skills.   Pt identified family, friends, \"father, and work\" as his social supports.  Pt identified \"myself\" as his personal strength.         "

## 2018-01-02 NOTE — PROGRESS NOTES
"Pt approached writer to \"check in with someone\". Pt appeared mostly calm, slightly tense. Pt expressed feeling irritated by a peer (G.F.) who \"thinks he can control what I do\". Pt expresses believing that this peer is purposely trying to \"pick at my own issues\". Writer and peer discussed that patient is the only person who can control how he behaves, and that he is in charge of himself. Pt responded well to this and agreed that peer can not control him, and that pt must focus on himself and finding his \"inner peace\". Patient initially declined to sign an BATSHEVA for his father, stating \"while i'm on a 72 hour hold I'm not signing anything\", later agreed to sign one.Pt is able to talk calmly with writer, yet does display tangential speech and thought blocking. Appears responding yet denies any hallucinations. When asked about racing thoughts, pt states \"I feel like i'm not thinking at all\". When asked why he declines medications, patient states \"neuroleptics do not work for me, I know what works for me.. I need to find my inner peace\".  Affect is flat to blunted. Pt responds well to calm presence, eye contact, validation and reassurance. Will continue to monitor and assess.    Spoke with patient's father (Jose Enrique) on the phone, who is concerned that patient is still \"not ready for discharge\". Jose Enrique alerted writer that patient hung up on him earlier after Jose Enrique was telling pt that he's not well enough yet to discharge and he needs to take his medication so his symptoms can improve. Writer encouraged Jose Enrique to call any time, and reassured him that there are no set discharge plans at this time. Writer also provided update on patient's general presentation.   "

## 2018-01-02 NOTE — PROGRESS NOTES
01/01/18 2100   Behavioral Health   Hallucinations denies / not responding to hallucinations   Thinking poor concentration   Orientation time: oriented   Memory baseline memory   Insight insight appropriate to events   Judgement impaired   Eye Contact at examiner   Affect blunted, flat   Mood mood is calm   Physical Appearance/Attire attire appropriate to age and situation   Hygiene neglected grooming - unclean body, hair, teeth   Suicidality (denied )   1. Wish to be Dead No   2. Non-Specific Active Suicidal Thoughts  No   Self Injury (denied )   Elopement (deenied )   Speech clear   Psychomotor / Gait balanced   Psycho Education   Type of Intervention 1:1 intervention   Response participates, initiates socially appropriate   Hours 0.5   Activities of Daily Living   Hygiene/Grooming independent   Oral Hygiene independent   Dress scrubs (behavioral health)   Laundry with supervision   Room Organization independent   Activity   Activity Assistance Provided independent   Behavioral Health Interventions   Psychotic Symptoms maintain safety precautions;monitor need to revise level of observation;maintain safe secure environment;encourage participation / independence with adls   Social and Therapeutic Interventions (Psychotic Symptoms) encourage participation in therapeutic groups and milieu activities;encourage effective boundaries with peers;encourage socialization with peers     Pt. Had remarkable shift comparing to the last previous days. Pt. Was calm and cooperative. Pt. Was visibile in milieu for the most part of the shift. Pt. Denied any paranoid thoughts and psychotic symptoms. Pt. denied SI/SIB. Pt. Attended A.A group and he said he likes it. Pt's appetite was good and sleeping well. No concerns. No visitors this evening.

## 2018-01-02 NOTE — PROGRESS NOTES
OT INITIAL ASSESSMENT       01/02/18 1500   General Information   Date Initially Attended OT 12/29/17   Special Considerations see note   Clinical Impression   Affect Flat   Orientation Oriented to person, place and time   Appearance and ADLs General cleanliness observed in most areas   Attention to Internal Stimuli No observed signs   Interaction Skills Interacts appropriately with staff;Interacts appropriately with peers   Ability to Communicate Needs Does so with prompts   Verbal Content Appropriate to topic;Delusional    Ability to Maintain Boundaries Maintains appropriate physical boundaries;Maintains appropriate verbal boundaries   Participation Independently participates   Concentration Concentrates 20-30 minutes   Ability to Concentrate With structure   Follows and Comprehends Directions Independently follows 1 step verbal directions   Memory Delayed and immediate recall intact   Organization Independently organizes simple tasks   Decision Making Independent   Planning and Problem Solving Independently plans ahead   Ability to Apply and Learn Concepts Needs further assessment   Frustrations / Stress Tolerance Needs further assessment   Level of Insight No insight   Self Esteem Accepts positive feedback   Social Supports Identifies utilizing supports

## 2018-01-03 VITALS
DIASTOLIC BLOOD PRESSURE: 87 MMHG | BODY MASS INDEX: 19.15 KG/M2 | TEMPERATURE: 97.8 F | WEIGHT: 133.8 LBS | RESPIRATION RATE: 16 BRPM | OXYGEN SATURATION: 97 % | HEART RATE: 106 BPM | HEIGHT: 70 IN | SYSTOLIC BLOOD PRESSURE: 143 MMHG

## 2018-01-03 PROCEDURE — 99238 HOSP IP/OBS DSCHRG MGMT 30/<: CPT | Performed by: PSYCHIATRY & NEUROLOGY

## 2018-01-03 PROCEDURE — 90853 GROUP PSYCHOTHERAPY: CPT

## 2018-01-03 PROCEDURE — 25000132 ZZH RX MED GY IP 250 OP 250 PS 637: Performed by: NURSE PRACTITIONER

## 2018-01-03 PROCEDURE — 97150 GROUP THERAPEUTIC PROCEDURES: CPT | Mod: GO

## 2018-01-03 RX ADMIN — NICOTINE POLACRILEX 4 MG: 2 GUM, CHEWING ORAL at 06:42

## 2018-01-03 ASSESSMENT — ACTIVITIES OF DAILY LIVING (ADL)
GROOMING: INDEPENDENT
LAUNDRY: WITH SUPERVISION
DRESS: STREET CLOTHES;INDEPENDENT
ORAL_HYGIENE: INDEPENDENT

## 2018-01-03 NOTE — PROGRESS NOTES
"Spoke with Pt about discharge today once hold is up. Pt reported he would like to make his own appointments, and does not believe he needs medication to feel better. Pt refused to sign BATSHEVA for provider and stated \"I have everything figured out at home\". Writer will note this conversation on AVS.   "

## 2018-01-03 NOTE — DISCHARGE INSTRUCTIONS
Behavioral Discharge Planning and Instructions      Summary:  You were admitted on 12/28/2017  due to Psychotic Symptomology.  You were treated by Dr. Gregor Owens MD and discharged on 1/3/18 from [facility]    Principal Diagnosis: Schizophrenia    Health Care Follow-up Appointments:   *If no appointments scheduled, explain: you reported to [facility] that you would like to make your own mental health appointments. Please make a psychiatric appointment within 30 days of discharge.   Attend all scheduled appointments with your outpatient providers. Call at least 24 hours in advance if you need to reschedule an appointment to ensure continued access to your outpatient providers.   Major Treatments, Procedures and Findings:  You were provided with: a psychiatric assessment, assessed for medical stability, medication evaluation and/or management, group therapy and milieu management    Symptoms to Report: feeling more aggressive, increased confusion, losing more sleep, mood getting worse or thoughts of suicide    Early warning signs can include: increased depression or anxiety sleep disturbances increased thoughts or behaviors of suicide or self-harm  increased unusual thinking, such as paranoia or hearing voices    Safety and Wellness:  Take all medicines as directed.  Make no changes unless your doctor suggests them.      Follow treatment recommendations.  Refrain from alcohol and non-prescribed drugs.  Ask your support system to help you reduce your access to items that could harm yourself or others. If there is a concern for safety, call 911.    Resources:   Crisis Intervention: 360.931.8408 or 595-853-3574 (TTY: 717.898.9785).  Call anytime for help.  National [place] on Mental Illness (www.[place].rozina.org): 710.445.6595 or 761-371-5119.  Alcoholics Anonymous (www.alcoholics-anonymous.org): Check your phone book for your local chapter.  Suicide Awareness Voices of Education (SAVE) (www.save.org):  "888-511-SAVE (7283)  National Suicide Prevention Line (www.mentalhealthmn.org): 563-337-VEFA (8826)  Mental Health Consumer/Survivor Network of [place] (www.mhcsn.net): 970.811.1890 or 353-472-1263  Mental Health Association of [place] (www.mentalhealth.org): 391.786.9726 or 806-019-7244  Self- Management and Recovery Training., Gift Card Combo-- Toll free: 341.499.3335  www.[employer]  [facility] [place] Crisis (COPE) Response - Adult 771 249-0040  Text 4 Life: txt \"LIFE\" to 96714 for immediate support and crisis intervention  Crisis text line: Text \"START\" to 858-633. Free, confidential, 24/7.  Crisis Intervention: 897.997.9543 or 302-602-6395. Call anytime for help.     The treatment team has appreciated the opportunity to work with you.     Please take care and make your recovery a daily recovery.   If you have any questions or concerns our unit number is [phone].  Thank you.       "

## 2018-01-03 NOTE — DISCHARGE SUMMARY
Discharge Note    Discharge instructions reviewed with patient and his father, paper works signed, including future appointments. Pt left with all of his belongings. He denied depressive symptoms, suicidal and homocidal ideations, and was encouraged to repeat back his warning signs and how to effectively cope with them.

## 2018-01-03 NOTE — PLAN OF CARE
"Problem: Psychotic Symptoms  Goal: Psychotic Symptoms  Signs and symptoms of listed problems will be absent or manageable.   Outcome: Improving  \"I'm better because I know what I need to do when I get out of here and I know what my goals are.  I know how to manage myself and my emotions.\"  Indicates needs to return to work and take better care of self.  Is no longer exhibiting delayed responses and is speaking appropriately, comments make sense.  Agreed is thinking more clearly and things make sense now. Denied hearing voices at any time. Was asked what caused this confusion, indicated the disagreement with his father.  Talked about \"having problems with addiction in the past\", states \"I know how to use things and not abuse them.\"  Does not think smoking Marijuana caused this psychotic episode.  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  States focus and concentration are good.  Has difficulty sleeping at night in the hospital.  Has indicated to  does not wish to work with a therapist and is refusing to take medications.  Has been visible in milieu, social with peers and has attended all groups thus far today.      "

## 2018-01-03 NOTE — DISCHARGE SUMMARY
[facility], [place]   Psychiatric Discharge Summary  Time: 28 minutes on encounter.    Natalio Rodas MRN# [id number]   Age: 22 year old YOB: 1995     Date of Admission:  12/28/2017  Date of Discharge:  01/03/18  Admitting Physician:  Gregor Owens  Discharge Physician:  Gregor Owens         Event Leading to Hospitalization and Hospital Course:   Natalio Rodas was admitted for possible paranoia and communication with electronic devices combination with recent substance use.       See Admission note by Gregor Owens   on 12/29/17 for additional details.     Natalio Rodas was initially hospitalized on a 72 hour hold with paranoia and communication with electronic devices and possible auditory hallucinations.  He had been using cannabis and possibly other substances and may have a history of schizophrenia.  He received 1 dose of paliperidone while he was in the hospital and attempt to commit him was done unfortunately with the holiday week [facility] would not come out and assess him.  He did not seem to have side effects but refused to take antipsychotic medications after the one dose of paliperidone.  His father was informed to bring him back to the hospital and start the commitment process as soon as possible if he decompensates in the community.  He did seem to improve somewhat most likely related to cannabis no longer being in his system.  If he has hospitalized in the future recommend commitment process as soon as he enters the emergency department to make sure that the commitment process follows through.  On day of discharge he denied any psychotic symptoms or paranoia or any sleep disturbances or any thoughts of harming himself or others or any sadness or trouble sleeping or guilty feelings.  He is future orientation of obtaining employment and not smoking cannabis and we again discussed safety planning which he recalled from  our previous meeting.           DIagnoses:   Schizophrenia  Cannabis use disorder severe  Tobacco use disorder  Possible history of traumatic brain injury  History of ADHD         Labs:   No results found for this or any previous visit (from the past 24 hour(s)).         Consults:   No consultations were requested during this admission           Discharge Medications:        Review of your medicines      STOP taking          ARIPiprazole 10 MG tablet   Commonly known as:  ABILIFY           hydrOXYzine 25 MG tablet   Commonly known as:  ATARAX                      Mental Status Examination:   Natalio is a 22-year-old male wearing a T-shirt and has curly hair.  His speech is of an appropriate rate and tone in his language is intact.  His behavior is somewhat odd that he appears to be distracted at times.  His affect is subdued though he does smile at times.  His mood he describes as ok.  His thought content consists of the above without thoughts of harming himself or others and potential delusions as above.  His thought process includes tangents and possible looseness of association.  He could potentially have abnormal perceptions.  He is alert and aware of his current location and circumstances.  His attention and concentration appears adequate.  His cognition and fund of knowledge appears normal.  Long-term/short-term/remote memory appears intact.  His insight and judgment are both impaired.         Discharge Plan:     Reason for your hospital stay   Schizophrenia and substance use     Activity   Your activity upon discharge: activity as tolerated     Discharge Instructions   1. Please do not harm yourself or others.  2. Please continue to take your medications.  3. Please follow up with your outpatient care team.  4. Please do not take drugs or alcohol as this will worsen your condition.  5. Please do not take more than the prescribed doses of medications as this may make them dangerous.   6. Please follow your  safety plan of action.  7. Please call crisis if having trouble.  8. If having thoughts of harming self or others please come in to the emergency department as soon as possible.     Full Code     Diet   Follow this diet upon discharge: Orders Placed This Encounter     Regular Diet Adult             Further instructions from your care team        Behavioral Discharge Planning and Instructions      Summary:  You were admitted on 12/28/2017  due to Psychotic Symptomology.  You were treated by Dr. Gregor Owens MD and discharged on 1/3/18 from [facility] to Home    Principal Diagnosis: Schizophrenia    Health Care Follow-up Appointments:   *If no appointments scheduled, explain: you reported to [facility] that you would like to make your own mental health appointments. Please make a psychiatric appointment within 30 days of discharge.   Attend all scheduled appointments with your outpatient providers. Call at least 24 hours in advance if you need to reschedule an appointment to ensure continued access to your outpatient providers.   Major Treatments, Procedures and Findings:  You were provided with: a psychiatric assessment, assessed for medical stability, medication evaluation and/or management, group therapy and milieu management    Symptoms to Report: feeling more aggressive, increased confusion, losing more sleep, mood getting worse or thoughts of suicide    Early warning signs can include: increased depression or anxiety sleep disturbances increased thoughts or behaviors of suicide or self-harm  increased unusual thinking, such as paranoia or hearing voices    Safety and Wellness:  Take all medicines as directed.  Make no changes unless your doctor suggests them.      Follow treatment recommendations.  Refrain from alcohol and non-prescribed drugs.  Ask your support system to help you reduce your access to items that could harm yourself or others. If there is a concern for safety, call 911.    Resources:  "  Crisis Intervention: 441.851.3590 or 409-014-3721 (TTY: 290.407.8799).  Call anytime for help.  National [place] on Mental Illness (www.mn.rozina.org): 562.751.4566 or 772-560-4747.  Alcoholics Anonymous (www.alcoholics-anonymous.org): Check your phone book for your local chapter.  Suicide Awareness Voices of Education (SAVE) (www.save.org): 883-796-LXOC (1336)  National Suicide Prevention Line (www.mentalhealthmn.org): 130-376-RGOP (3030)  Mental Health Consumer/Survivor Network of [place] (www.mhcsn.net): 285.298.1469 or 286-308-9973  Mental Health Association of [place] (www.mentalhealth.org): 660.737.3822 or 508-985-5827  Self- Management and Recovery Training., moneymeets-- Toll free: 487.476.9806  www.[employer]  [facility] Crisis (COPE) Response - Adult 101 943-0209  Text 4 Life: txt \"LIFE\" to 02086 for immediate support and crisis intervention  Crisis text line: Text \"START\" to 871-548. Free, confidential, 24/7.  Crisis Intervention: 653.150.4098 or 706-863-6076. Call anytime for help.     The treatment team has appreciated the opportunity to work with you.     Please take care and make your recovery a daily recovery.   If you have any questions or concerns our unit number is [phone].  Thank you.               Please send copy to unkown    During hospitalizations patients have perpetuating, complicating, situational, acute, and chronic conditions that lead to risk for suicidality or homicidality. During hospitalizations we mitigate any modifiable risk factors that may have been identified in the history and physical examination and throughout the hospitalization. Chronic non-modifiable risk factors are not able to be changed with acute hospitalization. As a patient that is discharging these risk factors have been modified as much as possible and a supportive network and safety plan has been put in place. Further modifications and assistance will have to be obtained in the outpatient setting and the " inpatient hospitalization team is no longer responsible for outcomes.    Gregor Mckenna  [facility]      The following document has been created with voice recognition software and may contain unintentional word substitutions.

## 2019-08-01 ENCOUNTER — APPOINTMENT (OUTPATIENT)
Dept: GENERAL RADIOLOGY | Facility: CLINIC | Age: 24
End: 2019-08-01
Attending: EMERGENCY MEDICINE
Payer: COMMERCIAL

## 2019-08-01 ENCOUNTER — APPOINTMENT (OUTPATIENT)
Dept: CT IMAGING | Facility: CLINIC | Age: 24
End: 2019-08-01
Attending: EMERGENCY MEDICINE
Payer: COMMERCIAL

## 2019-08-01 ENCOUNTER — HOSPITAL ENCOUNTER (EMERGENCY)
Facility: CLINIC | Age: 24
Discharge: HOME | End: 2019-08-01
Attending: EMERGENCY MEDICINE | Admitting: EMERGENCY MEDICINE
Payer: COMMERCIAL

## 2019-08-01 VITALS
OXYGEN SATURATION: 98 % | BODY MASS INDEX: 19.2 KG/M2 | DIASTOLIC BLOOD PRESSURE: 75 MMHG | TEMPERATURE: 98.8 F | HEART RATE: 83 BPM | RESPIRATION RATE: 20 BRPM | SYSTOLIC BLOOD PRESSURE: 118 MMHG | HEIGHT: 70 IN

## 2019-08-01 DIAGNOSIS — S02.2XXA CLOSED FRACTURE OF NASAL BONE, INITIAL ENCOUNTER: ICD-10-CM

## 2019-08-01 DIAGNOSIS — S02.32XA CLOSED FRACTURE OF LEFT ORBITAL FLOOR, INITIAL ENCOUNTER (H): ICD-10-CM

## 2019-08-01 PROCEDURE — 99284 EMERGENCY DEPT VISIT MOD MDM: CPT | Mod: 25

## 2019-08-01 PROCEDURE — 36415 COLL VENOUS BLD VENIPUNCTURE: CPT

## 2019-08-01 PROCEDURE — 70160 X-RAY EXAM OF NASAL BONES: CPT

## 2019-08-01 PROCEDURE — 70486 CT MAXILLOFACIAL W/O DYE: CPT

## 2019-08-01 NOTE — ED AVS SNAPSHOT
Emergency Department  [address] [place] [address]  Phone:  [phone]  Fax:  [phone]                                    Natalio Rodas   MRN: [id number]    Department:   Emergency Department   Date of Visit:  8/1/2019           After Visit Summary Signature Page    I have received my discharge instructions, and my questions have been answered. I have discussed any challenges I see with this plan with the nurse or doctor.    ..........................................................................................................................................  Patient/Patient Representative Signature      ..........................................................................................................................................  Patient Representative Print Name and Relationship to Patient    ..................................................               ................................................  Date                                   Time    ..........................................................................................................................................  Reviewed by Signature/Title    ...................................................              ..............................................  Date                                               Time          22EPIC Rev 08/18
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
## 2019-08-01 NOTE — LETTER
August 1, 2019      To Whom It May Concern:      Natalio INDRA Rodas was seen in our Emergency Department today, 08/01/19.  I expect his condition to improve over the next 2 days.  He may return to work Monday, August 5th full duty.     Sincerely,
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
## 2019-08-03 ENCOUNTER — HOSPITAL ENCOUNTER (EMERGENCY)
Facility: CLINIC | Age: 24
Discharge: HOME | End: 2019-08-04
Attending: EMERGENCY MEDICINE | Admitting: EMERGENCY MEDICINE
Payer: COMMERCIAL

## 2019-08-03 VITALS
HEIGHT: 67 IN | DIASTOLIC BLOOD PRESSURE: 88 MMHG | BODY MASS INDEX: 20.4 KG/M2 | TEMPERATURE: 98.5 F | OXYGEN SATURATION: 99 % | SYSTOLIC BLOOD PRESSURE: 139 MMHG | HEART RATE: 86 BPM | WEIGHT: 130 LBS | RESPIRATION RATE: 16 BRPM

## 2019-08-03 DIAGNOSIS — S06.0X0A CONCUSSION WITHOUT LOSS OF CONSCIOUSNESS, INITIAL ENCOUNTER: ICD-10-CM

## 2019-08-03 PROCEDURE — 96374 THER/PROPH/DIAG INJ IV PUSH: CPT

## 2019-08-03 PROCEDURE — 99284 EMERGENCY DEPT VISIT MOD MDM: CPT | Mod: 25

## 2019-08-03 PROCEDURE — 25000128 H RX IP 250 OP 636: Performed by: EMERGENCY MEDICINE

## 2019-08-03 PROCEDURE — 96375 TX/PRO/DX INJ NEW DRUG ADDON: CPT

## 2019-08-03 PROCEDURE — 96361 HYDRATE IV INFUSION ADD-ON: CPT

## 2019-08-03 RX ORDER — DIPHENHYDRAMINE HYDROCHLORIDE 50 MG/ML
25 INJECTION INTRAMUSCULAR; INTRAVENOUS ONCE
Status: COMPLETED | OUTPATIENT
Start: 2019-08-03 | End: 2019-08-03

## 2019-08-03 RX ORDER — METOCLOPRAMIDE 10 MG/1
10 TABLET ORAL 3 TIMES DAILY PRN
Qty: 20 TABLET | Refills: 0 | Status: ON HOLD | OUTPATIENT
Start: 2019-08-03 | End: 2019-09-11

## 2019-08-03 RX ORDER — METOCLOPRAMIDE HYDROCHLORIDE 5 MG/ML
10 INJECTION INTRAMUSCULAR; INTRAVENOUS ONCE
Status: COMPLETED | OUTPATIENT
Start: 2019-08-03 | End: 2019-08-03

## 2019-08-03 RX ADMIN — DIPHENHYDRAMINE HYDROCHLORIDE 25 MG: 50 INJECTION, SOLUTION INTRAMUSCULAR; INTRAVENOUS at 22:59

## 2019-08-03 RX ADMIN — SODIUM CHLORIDE 1000 ML: 9 INJECTION, SOLUTION INTRAVENOUS at 23:02

## 2019-08-03 RX ADMIN — METOCLOPRAMIDE 10 MG: 5 INJECTION, SOLUTION INTRAMUSCULAR; INTRAVENOUS at 22:59

## 2019-08-03 ASSESSMENT — ENCOUNTER SYMPTOMS
VOMITING: 0
HEADACHES: 1
NAUSEA: 1
FEVER: 0

## 2019-08-03 ASSESSMENT — MIFFLIN-ST. JEOR: SCORE: 1553.31

## 2019-08-03 NOTE — ED AVS SNAPSHOT
Emergency Department  [address] [place] [address]  Phone:  [phone]  Fax:  [phone]                                    Natalio Rodas   MRN: [id number]    Department:   Emergency Department   Date of Visit:  8/3/2019           After Visit Summary Signature Page    I have received my discharge instructions, and my questions have been answered. I have discussed any challenges I see with this plan with the nurse or doctor.    ..........................................................................................................................................  Patient/Patient Representative Signature      ..........................................................................................................................................  Patient Representative Print Name and Relationship to Patient    ..................................................               ................................................  Date                                   Time    ..........................................................................................................................................  Reviewed by Signature/Title    ...................................................              ..............................................  Date                                               Time          22EPIC Rev 08/18

## 2019-08-03 NOTE — LETTER
August 3, 2019      To Whom It May Concern:      Natalio Rodas was seen in our Emergency Department today, 08/03/19.  I expect his condition to improve over the next 3 days.  He may return to work 8/5/19    Sincerely,        Andrea Montague MD

## 2019-08-04 NOTE — ED TRIAGE NOTES
Pt seen here yesterday for a concussion. C/o loss of coordination, difficulty holding his head up, difficulty staying awake and hearing loss in right ear. Pt doesn't think he can go to work on Monday.
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
## 2019-08-05 ENCOUNTER — AMBULATORY - HEALTHEAST (OUTPATIENT)
Dept: NEUROLOGY | Facility: CLINIC | Age: 24
End: 2019-08-05

## 2019-08-05 DIAGNOSIS — S06.0XAA CONCUSSION: ICD-10-CM

## 2019-09-08 ENCOUNTER — HOSPITAL ENCOUNTER (EMERGENCY)
Facility: CLINIC | Age: 24
Discharge: PSYCHIATRIC OR SUBSTANCE USE FACILITY | End: 2019-09-09
Attending: EMERGENCY MEDICINE | Admitting: EMERGENCY MEDICINE
Payer: COMMERCIAL

## 2019-09-08 DIAGNOSIS — F20.0 ACUTE EXACERBATION OF CHRONIC PARANOID SCHIZOPHRENIA (H): ICD-10-CM

## 2019-09-08 DIAGNOSIS — R46.89 AGGRESSIVE BEHAVIOR: ICD-10-CM

## 2019-09-08 PROCEDURE — 96372 THER/PROPH/DIAG INJ SC/IM: CPT | Mod: 59

## 2019-09-08 PROCEDURE — 25000128 H RX IP 250 OP 636: Performed by: EMERGENCY MEDICINE

## 2019-09-08 PROCEDURE — 96374 THER/PROPH/DIAG INJ IV PUSH: CPT

## 2019-09-08 PROCEDURE — 90791 PSYCH DIAGNOSTIC EVALUATION: CPT

## 2019-09-08 PROCEDURE — 99285 EMERGENCY DEPT VISIT HI MDM: CPT | Mod: 25

## 2019-09-08 RX ORDER — WATER 10 ML/10ML
INJECTION INTRAMUSCULAR; INTRAVENOUS; SUBCUTANEOUS
Status: DISCONTINUED
Start: 2019-09-08 | End: 2019-09-08 | Stop reason: HOSPADM

## 2019-09-08 RX ORDER — OLANZAPINE 10 MG/2ML
INJECTION, POWDER, FOR SOLUTION INTRAMUSCULAR
Status: DISCONTINUED
Start: 2019-09-08 | End: 2019-09-08 | Stop reason: HOSPADM

## 2019-09-08 RX ORDER — OLANZAPINE 10 MG/2ML
10 INJECTION, POWDER, FOR SOLUTION INTRAMUSCULAR ONCE
Status: COMPLETED | OUTPATIENT
Start: 2019-09-08 | End: 2019-09-08

## 2019-09-08 RX ADMIN — OLANZAPINE 10 MG: 10 INJECTION, POWDER, FOR SOLUTION INTRAMUSCULAR at 16:47
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
## 2019-09-08 NOTE — ED NOTES
Bed: [facility]  Expected date:   Expected time:   Means of arrival:   Comments:  516  23 M hallucinations/poss drug use/schizophrenia  1634

## 2019-09-08 NOTE — ED NOTES
In person face to face assessment completed, including an evaluation of the patient's immediate reaction to the intervention, complete review of systems assessment, behavioral assessment and review/assessment of history, drugs and medications, recent labs, etc., and behavioral condition.  The patient experienced: No adverse physical outcome from seclusion/restraint initiation.  The intervention of restraint or seclusion needs to terminate.    5:34 PM         Trierweiler, Chad A, MD  09/08/19 0268

## 2019-09-09 ENCOUNTER — HOSPITAL ENCOUNTER (INPATIENT)
Facility: CLINIC | Age: 24
LOS: 15 days | Discharge: HOME | DRG: 885 | End: 2019-09-24
Attending: PSYCHIATRY & NEUROLOGY | Admitting: PSYCHIATRY & NEUROLOGY
Payer: COMMERCIAL

## 2019-09-09 VITALS
TEMPERATURE: 98.7 F | HEART RATE: 125 BPM | OXYGEN SATURATION: 100 % | BODY MASS INDEX: 20.36 KG/M2 | DIASTOLIC BLOOD PRESSURE: 92 MMHG | WEIGHT: 130 LBS | SYSTOLIC BLOOD PRESSURE: 152 MMHG | RESPIRATION RATE: 18 BRPM

## 2019-09-09 DIAGNOSIS — F31.9 BIPOLAR AFFECTIVE DISORDER, REMISSION STATUS UNSPECIFIED (H): Primary | ICD-10-CM

## 2019-09-09 DIAGNOSIS — F25.0 SCHIZOAFFECTIVE DISORDER, BIPOLAR TYPE (H): ICD-10-CM

## 2019-09-09 PROCEDURE — 25000132 ZZH RX MED GY IP 250 OP 250 PS 637: Performed by: EMERGENCY MEDICINE

## 2019-09-09 PROCEDURE — 99222 1ST HOSP IP/OBS MODERATE 55: CPT | Performed by: PSYCHIATRY & NEUROLOGY

## 2019-09-09 PROCEDURE — 25000128 H RX IP 250 OP 636: Performed by: EMERGENCY MEDICINE

## 2019-09-09 PROCEDURE — 25000132 ZZH RX MED GY IP 250 OP 250 PS 637: Performed by: PSYCHIATRY & NEUROLOGY

## 2019-09-09 PROCEDURE — 12400001 ZZH R&B MH UMMC

## 2019-09-09 PROCEDURE — 25000128 H RX IP 250 OP 636: Performed by: PSYCHIATRY & NEUROLOGY

## 2019-09-09 RX ORDER — TRAZODONE HYDROCHLORIDE 50 MG/1
50 TABLET, FILM COATED ORAL
Status: DISCONTINUED | OUTPATIENT
Start: 2019-09-09 | End: 2019-09-24 | Stop reason: HOSPADM

## 2019-09-09 RX ORDER — OLANZAPINE 10 MG/2ML
10 INJECTION, POWDER, FOR SOLUTION INTRAMUSCULAR EVERY 6 HOURS PRN
Status: DISCONTINUED | OUTPATIENT
Start: 2019-09-09 | End: 2019-09-09 | Stop reason: HOSPADM

## 2019-09-09 RX ORDER — OLANZAPINE 10 MG/1
10 TABLET, ORALLY DISINTEGRATING ORAL EVERY 6 HOURS PRN
Status: DISCONTINUED | OUTPATIENT
Start: 2019-09-09 | End: 2019-09-09 | Stop reason: HOSPADM

## 2019-09-09 RX ORDER — WATER 10 ML/10ML
INJECTION INTRAMUSCULAR; INTRAVENOUS; SUBCUTANEOUS
Status: DISCONTINUED
Start: 2019-09-09 | End: 2019-09-09 | Stop reason: HOSPADM

## 2019-09-09 RX ORDER — ACETAMINOPHEN 325 MG/1
650 TABLET ORAL EVERY 4 HOURS PRN
Status: DISCONTINUED | OUTPATIENT
Start: 2019-09-09 | End: 2019-09-24 | Stop reason: HOSPADM

## 2019-09-09 RX ORDER — OLANZAPINE 10 MG/1
10 TABLET, ORALLY DISINTEGRATING ORAL 2 TIMES DAILY
Status: DISCONTINUED | OUTPATIENT
Start: 2019-09-09 | End: 2019-09-19

## 2019-09-09 RX ORDER — OLANZAPINE 10 MG/1
10 TABLET, ORALLY DISINTEGRATING ORAL 2 TIMES DAILY
Status: DISCONTINUED | OUTPATIENT
Start: 2019-09-09 | End: 2019-09-09 | Stop reason: HOSPADM

## 2019-09-09 RX ORDER — OLANZAPINE 10 MG/1
10 TABLET ORAL
Status: DISCONTINUED | OUTPATIENT
Start: 2019-09-09 | End: 2019-09-11

## 2019-09-09 RX ORDER — LORAZEPAM 1 MG/1
1 TABLET ORAL ONCE
Status: COMPLETED | OUTPATIENT
Start: 2019-09-09 | End: 2019-09-09

## 2019-09-09 RX ORDER — OLANZAPINE 10 MG/2ML
10 INJECTION, POWDER, FOR SOLUTION INTRAMUSCULAR
Status: DISCONTINUED | OUTPATIENT
Start: 2019-09-09 | End: 2019-09-24 | Stop reason: HOSPADM

## 2019-09-09 RX ORDER — HYDROXYZINE HYDROCHLORIDE 25 MG/1
25 TABLET, FILM COATED ORAL EVERY 4 HOURS PRN
Status: DISCONTINUED | OUTPATIENT
Start: 2019-09-09 | End: 2019-09-24 | Stop reason: HOSPADM

## 2019-09-09 RX ORDER — OLANZAPINE 10 MG/1
10 TABLET, ORALLY DISINTEGRATING ORAL 2 TIMES DAILY
Status: ON HOLD | COMMUNITY
End: 2019-09-11

## 2019-09-09 RX ORDER — ALUMINA, MAGNESIA, AND SIMETHICONE 2400; 2400; 240 MG/30ML; MG/30ML; MG/30ML
30 SUSPENSION ORAL EVERY 4 HOURS PRN
Status: DISCONTINUED | OUTPATIENT
Start: 2019-09-09 | End: 2019-09-24 | Stop reason: HOSPADM

## 2019-09-09 RX ORDER — OLANZAPINE 10 MG/1
10 TABLET, ORALLY DISINTEGRATING ORAL ONCE
Status: COMPLETED | OUTPATIENT
Start: 2019-09-09 | End: 2019-09-09

## 2019-09-09 RX ORDER — LORAZEPAM 2 MG/ML
1 INJECTION INTRAMUSCULAR ONCE
Status: COMPLETED | OUTPATIENT
Start: 2019-09-09 | End: 2019-09-09

## 2019-09-09 RX ADMIN — OLANZAPINE 10 MG: 10 TABLET, ORALLY DISINTEGRATING ORAL at 08:26

## 2019-09-09 RX ADMIN — OLANZAPINE 10 MG: 10 TABLET, ORALLY DISINTEGRATING ORAL at 11:45

## 2019-09-09 RX ADMIN — LORAZEPAM 1 MG: 2 INJECTION INTRAMUSCULAR; INTRAVENOUS at 18:39

## 2019-09-09 RX ADMIN — OLANZAPINE 10 MG: 10 INJECTION, POWDER, FOR SOLUTION INTRAMUSCULAR at 16:48

## 2019-09-09 RX ADMIN — LORAZEPAM 1 MG: 1 TABLET ORAL at 01:00

## 2019-09-09 ASSESSMENT — MIFFLIN-ST. JEOR: SCORE: 1573.71

## 2019-09-09 NOTE — ED NOTES
I was just notified by ED Charge RN that patient is accepted at [facility] for Psych admission.  72hr hold started, 9/9/19 at 15:35.     Darius Freeman MD  09/09/19 7653

## 2019-09-09 NOTE — ED PROVIDER NOTES
Patient signed out to Dr. Freeman with psych admission pending.  Patient was given a dose of ativan overnight as he was moderately worked up and then slept without further incident.       Wes Chinchilla MD  09/09/19 0629
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
## 2019-09-09 NOTE — ED NOTES
I took over care of this patient from my partner, Dr. Chinchilla, at approximately 0600.    Briefly, patient presented with psychosis and psychiatric medication noncompliance.  I was asked to assume care while awaiting an inpatient psychiatric bed.    The patient was seen by Dr. Barnes, [facility] psychiatrist, and bedside consultation late this morning.  He recommends treatment with Zyprexa.  I spoke with him in the ED.  I evaluated the patient briefly in the middle of the day.  The patient is clearly psychotic.  He ultimately excepted a dose of oral Zyprexa.  I checked on him several hours thereafter and he was resting calmly in bed.  He is still boarding in the ED because no available inpatient psychiatric bed has been secured to this point.  Care signed out to Dr. El at 1500 for ongoing care this evening.    MD Lydia Silva, Darius Houston MD  09/09/19 4669
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
## 2019-09-09 NOTE — ED NOTES
"Patient walked out of room to [facility].  Writer went into [facility] and asked pt if he was through with his meal.  Pt looked at at pt stating \"Yeah, You think I am a threat and you press the alarm, (pointing to writers panic button).  Pt stated \"I need water\" Writer stated \"please\" Pt stated \"thank you\" picked up his meal tray and walked into his room.  Writer came back with pitcher of water.  Pt through with meal tray and given to writer who was standing at the threshold of the room.  Pt stated \"You pump me with that zyprexa to get me high.\"  Pt refused second offer of zyprexa.  Increased disorganized thoughts,  Posture escalating and pacing in [facility].  Pt stated to writer \"You think I look like I have nothing, I have a good job, successful, I have a 2017 Whip.  I called the police, do you think I wanted to crunch potato chips in my car?  I had to make a scene,  The police said I needed relocation from being murdered.\"  Pt needs reinforcement with fleet of thoughts and rambling disorganized conversation  "

## 2019-09-10 PROCEDURE — 93005 ELECTROCARDIOGRAM TRACING: CPT

## 2019-09-10 PROCEDURE — 99222 1ST HOSP IP/OBS MODERATE 55: CPT | Mod: AI | Performed by: PSYCHIATRY & NEUROLOGY

## 2019-09-10 PROCEDURE — 25000132 ZZH RX MED GY IP 250 OP 250 PS 637: Performed by: STUDENT IN AN ORGANIZED HEALTH CARE EDUCATION/TRAINING PROGRAM

## 2019-09-10 PROCEDURE — 12400001 ZZH R&B MH UMMC

## 2019-09-10 PROCEDURE — 93010 ELECTROCARDIOGRAM REPORT: CPT | Performed by: INTERNAL MEDICINE

## 2019-09-10 PROCEDURE — G0177 OPPS/PHP; TRAIN & EDUC SERV: HCPCS

## 2019-09-10 RX ORDER — LORAZEPAM 2 MG/1
2 TABLET ORAL EVERY 6 HOURS PRN
Status: DISCONTINUED | OUTPATIENT
Start: 2019-09-10 | End: 2019-09-24 | Stop reason: HOSPADM

## 2019-09-10 RX ORDER — HALOPERIDOL 5 MG/1
5 TABLET ORAL EVERY 6 HOURS PRN
Status: DISCONTINUED | OUTPATIENT
Start: 2019-09-10 | End: 2019-09-24 | Stop reason: HOSPADM

## 2019-09-10 RX ORDER — DIPHENHYDRAMINE HCL 50 MG
50 CAPSULE ORAL EVERY 6 HOURS PRN
Status: DISCONTINUED | OUTPATIENT
Start: 2019-09-10 | End: 2019-09-24 | Stop reason: HOSPADM

## 2019-09-10 RX ADMIN — NICOTINE POLACRILEX 4 MG: 2 GUM, CHEWING BUCCAL at 15:24

## 2019-09-10 RX ADMIN — NICOTINE POLACRILEX 4 MG: 2 GUM, CHEWING BUCCAL at 19:19

## 2019-09-10 RX ADMIN — OLANZAPINE 10 MG: 10 TABLET, FILM COATED ORAL at 18:37

## 2019-09-10 RX ADMIN — OLANZAPINE 10 MG: 10 TABLET, ORALLY DISINTEGRATING ORAL at 07:34

## 2019-09-10 ASSESSMENT — ACTIVITIES OF DAILY LIVING (ADL)
ORAL_HYGIENE: INDEPENDENT
HYGIENE/GROOMING: INDEPENDENT
DRESS: INDEPENDENT

## 2019-09-10 ASSESSMENT — MIFFLIN-ST. JEOR: SCORE: 1570.08

## 2019-09-10 NOTE — PROGRESS NOTES
This shift pt had several instances of posturing, threatening staff and causing a scene in the milieu (yelling, spilling ice and water, posturing at staff, hitting and kicking property) and needed redirection and room breaks constantly.

## 2019-09-10 NOTE — PROGRESS NOTES
Writer unable to complete admission profile due to patient sleeping.   HS Zyprexa also not given because patient was sleeping.

## 2019-09-10 NOTE — PROGRESS NOTES
"Writer unable to complete a formal check-in with patient due to hostile and erratic behaviors. Patient has been receptive of medications but they do not seem to help him stay calm.     \"the Zyprexa doesn't do anything, if they would've given me Benzos it would have better results. I use this stuff on the street\" \"I just act like this because it's fun\"     Patient threw a cup of water at staff, he threw a bottle of lotion in his room which broke. Patient has slammed his hand on the desk as well as the walls. Patient thinks he his discharging at 4pm today but writer explained the hold started yesterday, he was under the impression it started as soon as he got to [facility].     Patient lacks insight, presents with flight of ideas.     Writer unable to assess SI/SIB, hallucinations, anxiety and depression.     "

## 2019-09-10 NOTE — PROGRESS NOTES
"   09/10/19 1310   General Information   Date Initially Attended OT 09/10/19     Pt attended morning wellness and coping skill based game: Movement Nimbuzzga. Education was provided on the sensory system, mind-body connection, and the use of movement and sensory strategies for self regulation. Pt Response: Hyperverbal with an intrusive and edgy undertone. Tangential and grandiose; impulsively initiated semi-related and bizarre topics of conversation during discussion including dating and creating a movement of energy. Resistive to redirection from writer requiring warning and subsequently asked to be excused from group - Pt refused. Made vaguely threatening statements after redirection including \"I didn't bring this water in for me.. It's actually for you\" eluding to throwing water on writer, and \"see what I mean, I know what you're doing but it's not going to work\". Writer continued to diffuse throughout group to avoid escalation and potential code. Pt became highly irritable towards the end of group leading to yelling, throwing his hands in the air and slamming the door on his way out. Peers appeared quite discomforted by these gestures.     Writer approached Pt in his room after group to discuss disruptive behavior. Pt apologized for \"being so disrespectful.. I have anxiety and need some serious help. I need to be in groups\". Writer informed Pt that he is not allowed in groups this afternoon and will reevaluate tomorrow morning. Pt became upset and slammed door after writer and psych associate stepped into the biggs.     Not allowed to attend unit programming at this time. Continue to assess appropriateness for therapeutic programming including collaboration with multi-disciplinary team.     Chikis Bach, OT on 9/10/2019 at 1:24 PM    "

## 2019-09-10 NOTE — PROGRESS NOTES
09/09/19 2212   Patient Belongings   Patient Belongings locker   Patient Belongings Put in Hospital Secure Location (Security or Locker, etc.) cash/credit card;cell phone/electronics;clothing;keys;money (see comment);necklace;shoes;wallet   Belongings Search Yes   Clothing Search Yes   Second Staff Berry ZAYAS   Shoes, cell phone, sweatshirt, socks, underpants, t-shirt, pants, earbuds, keys, chapstick, necklace, water bottle, wallet.    Sent to Security: [place] 's License, $3 cash, VISA Credit Card [id number].    A               Admission:  I am responsible for any personal items that are not sent to the safe or pharmacy.  [facility] is not responsible for loss, theft or damage of any property in my possession.    Signature:  _________________________________ Date: _______  Time: _____                                              Staff Signature:  ____________________________ Date: ________  Time: _____      2nd Staff person, if patient is unable/unwilling to sign:    Signature: ________________________________ Date: ________  Time: _____     Discharge:  [facility] has returned all of my personal belongings:    Signature: _________________________________ Date: ________  Time: _____                                          Staff Signature:  ____________________________ Date: ________  Time: _____
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
## 2019-09-10 NOTE — PROGRESS NOTES
09/09/19 2100   Patient Belongings   Did you bring any home meds/supplements to the hospital?  No   Patient Belongings locker   Patient Belongings Put in Hospital Secure Location (Security or Locker, etc.) cell phone/electronics   Belongings Search Yes   Clothing Search Yes   Second Staff Jamari [id number]     Pt locker-shorts, watch, cell phone.      Security-nothing.    A               Admission:  I am responsible for any personal items that are not sent to the safe or pharmacy.  [facility] is not responsible for loss, theft or damage of any property in my possession.    Signature:  _________________________________ Date: _______  Time: _____                                              Staff Signature:  ____________________________ Date: ________  Time: _____      2nd Staff person, if patient is unable/unwilling to sign:    Signature: ________________________________ Date: ________  Time: _____     Discharge:  [facility] has returned all of my personal belongings:    Signature: _________________________________ Date: ________  Time: _____                                          Staff Signature:  ____________________________ Date: ________  Time: _____
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
## 2019-09-10 NOTE — PLAN OF CARE
BEHAVIORAL TEAM DISCUSSION    Participants:  Dr. Woodard, Dr. Young, Dr. Weiss, Gudelia Portillo MA.LP, Med students    Anticipated length of stay:   14 -21 days    Continued Stay Criteria/Rationale:   Acute soheila/psychosis    Medical/Physical:   Recent concussion    Precautions:   Behavioral Orders   Procedures     Assault precautions     Code 1 - Restrict to Unit     Elopement precautions     Routine Programming     As clinically indicated     Single Room     Status 15     Every 15 minutes.     Plan:  Patient will have ongoing psychiatric assessment.  Medications will be reviewed and adjusted per MD as indicated.    Family will be contacted for collateral information.  A petition for commitment and Banuelos petition will be filed   Hospital staff will provide a safe environment and a therapeutic milieu. Patient will be encouraged to participate in unit groups and activities.   CTC will continue to assess needs and  ensure appropriate follow up care is in place.       Rationale for change in precautions or plan:   No changes in plan/precautions at this time

## 2019-09-10 NOTE — PROGRESS NOTES
"Pt is having manic behavior. He is pacing in the hallway, speaking loud, rapid speech, and bothers other patients. Pt is offered Zyprexa but refused, saying \"I don;'t need any medication\". \"I know what you try to do to my brain\". \"let me go home\". Staff will continue to monitor pt per care plan. Pt will be transferred to Station 22 after dinner for care.  At 17:40, pt becomse very agitated and yelling in the hallway. He comes to the station and  a plastic and slams it on the table breaking the basket. Pt then goes to his room.   At 6:35 pm, pt was out for dinner. Pt requested Zyprexa, saying \"I need something to clear my head\". Zyprexa was given to pt per request. Pt was transferred to Station 22 afterward. Pt continued to be very agitated. On the way to Station 22, he put a tube of toothpaste on the floor and forcefully stepped on it, The toothpaste spread all over. Report was given to Ronald GILLIS.    "

## 2019-09-10 NOTE — PROGRESS NOTES
Pt has been extremely hostile and verbally abusive to patients and staff. Pt has been threatening to hit staff and patients, yelling, cursing and throwing things at staff for most part of the shift. Pt keeps approaching other patients and threatening them and making them feel fearful of being out in the milieu. At sone point a female patient got so scared that she had to call her parents to come get her because she felt unsafe. Another patient reported that her anxiety went from 2 to 10 and PTSD was triggered from this patients assault threats and yelling and throwing things. Around midday pt threw a cup if iced water at a staff for redirecting him. Pt is very oppositional and throws a tantrum the second he is redirected by being vulgar and advancing at staff with his fist or clapping in their face as he yells at them. Pt mood goes from one extreme to the other in a span of seconds. Pt appears to be emotionally unstable.
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
## 2019-09-10 NOTE — PLAN OF CARE
Problem: General Plan of Care (Inpatient Behavioral)  Goal: Individualization/Patient Specific Goal (IP Behavioral)  Description  The patient and/or their representative will achieve their patient-specific goals related to the plan of care.    The patient-specific goals include:  Flowsheets (Taken 9/10/2019 1247)  Patient Strengths: Has vocational interests i.e., hobbies; Stable and supportive family  Note:   Patient's goals:  Unable to participate    Plan for admission:  1. Stabilization of mood/thought disorder symptoms  2. Safe with self/others  3. Medication management per MD's  4. Court Process resolved  5. Coordination of care with outpatient providers, family  6. Substance use addressed  7. Psychiatric follow up care in place

## 2019-09-11 LAB — INTERPRETATION ECG - MUSE: NORMAL

## 2019-09-11 PROCEDURE — 12400001 ZZH R&B MH UMMC

## 2019-09-11 PROCEDURE — H2032 ACTIVITY THERAPY, PER 15 MIN: HCPCS

## 2019-09-11 PROCEDURE — 25000132 ZZH RX MED GY IP 250 OP 250 PS 637: Performed by: STUDENT IN AN ORGANIZED HEALTH CARE EDUCATION/TRAINING PROGRAM

## 2019-09-11 PROCEDURE — 99232 SBSQ HOSP IP/OBS MODERATE 35: CPT | Mod: GC | Performed by: PSYCHIATRY & NEUROLOGY

## 2019-09-11 PROCEDURE — G0177 OPPS/PHP; TRAIN & EDUC SERV: HCPCS

## 2019-09-11 RX ORDER — OLANZAPINE 10 MG/1
10 TABLET, ORALLY DISINTEGRATING ORAL
Status: DISCONTINUED | OUTPATIENT
Start: 2019-09-11 | End: 2019-09-24 | Stop reason: HOSPADM

## 2019-09-11 RX ORDER — OLANZAPINE 5 MG/1
5 TABLET, ORALLY DISINTEGRATING ORAL AT BEDTIME
Status: DISCONTINUED | OUTPATIENT
Start: 2019-09-11 | End: 2019-09-11

## 2019-09-11 RX ADMIN — OLANZAPINE 10 MG: 10 TABLET, ORALLY DISINTEGRATING ORAL at 19:52

## 2019-09-11 RX ADMIN — NICOTINE POLACRILEX 4 MG: 2 GUM, CHEWING BUCCAL at 09:05

## 2019-09-11 RX ADMIN — NICOTINE POLACRILEX 4 MG: 2 GUM, CHEWING BUCCAL at 20:49

## 2019-09-11 RX ADMIN — OLANZAPINE 10 MG: 10 TABLET, ORALLY DISINTEGRATING ORAL at 09:35

## 2019-09-11 RX ADMIN — OLANZAPINE 10 MG: 10 TABLET, ORALLY DISINTEGRATING ORAL at 17:22

## 2019-09-11 RX ADMIN — NICOTINE POLACRILEX 4 MG: 2 GUM, CHEWING BUCCAL at 13:06

## 2019-09-11 ASSESSMENT — ACTIVITIES OF DAILY LIVING (ADL)
HYGIENE/GROOMING: INDEPENDENT
LAUNDRY: WITH SUPERVISION
ORAL_HYGIENE: INDEPENDENT
DRESS: INDEPENDENT

## 2019-09-11 NOTE — PROGRESS NOTES
"Pt slept 5.5 hours.     Awake at 0520 and out to nurse station speaking rapidly and loudly with tangential speech. Pt requested \"my morning medications\" and walked away when writer attempted to explain morning medication schedule. Pt then came back from room holding up a piece of nicotine gum and saying \"I know the system\" and put gum in mouth. Writer did patient teaching about taking medications at the time they are given and not pocketing them.  Pt stated \"You are not giving me credit for figuring out the system.\"    Pt took initiative to fix headphones he had been wearing and pointed out that the headphones were from 20N and not 22N. Pt stated \"I'm a  on the outside and can figure out anything.\" Pt grew more redirectable with volume of voice and was socially appropriate with staff and engaged in lively conversation with peers from 0530 through end of shift.    Pt brought Oats and Honey snack bar to staff and pointed out fine print that stated may contain peanuts as another patient is on a nut allergy on 20/22N. Pt encouraged for his attention and concern for peers.   "
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
## 2019-09-11 NOTE — PROGRESS NOTES
Received VM from Naomie Leigh PPS stating that she will be here to screen patient this afternoon.  Left her a VM informing her that patient has been transferred to station 22.

## 2019-09-11 NOTE — PROGRESS NOTES
Patient was in milieu nearly all shift.  Patient was very irritable in the morning.  Patient was verbally abusive to various on numerous occasions.  Patient was given a break in his room.  Patient admitted to feeling agitated and asked for a PRN medication.  Patient was hyperverbal and paranoid of staff.  Patient did become less agitated after a break in his room, PRN and having a lengthy conversation with staff.  Patient attends group activities when able.  Patient denies SI/SIB.     09/11/19 1400   Behavioral Health   Hallucinations denies / not responding to hallucinations   Thinking paranoid;distractable   Orientation person: oriented;place: oriented   Memory baseline memory   Insight poor   Judgement impaired   Affect angry;irritable;tense   Mood anxious   Physical Appearance/Attire attire appropriate to age and situation   Hygiene well groomed   Suicidality other (see comments)  (denies)   1. Wish to be Dead (Past Month) No   2. Non-Specific Active Suicidal Thoughts (Past Month) No   Self Injury other (see comment)  (denies)   Elopement Statements about wanting to leave   Activity other (see comment)  (in milieu)   Speech clear;coherent   Medication Sensitivity no stated side effects   Psychomotor / Gait balanced;steady   Occupational Therapy   Type of Intervention structured groups   Response Participates with cues/redirection   Hours 1   Psycho Education   Type of Intervention 1:1 intervention   Response participates with cues/redirection   Hours 0.5   Treatment Detail check-in

## 2019-09-11 NOTE — PROGRESS NOTES
Pt was eager to attended group. Initially agreeable to engage appropriately. Hyperverbal and edgy. Quickly became disrespectful and was asked to excuse himself which he promptly complied. Later slammed clinic door while group proceeded.  No Charge.     Continue to assess appropriateness for therapeutic programming.     Chikis Bach OT on 9/11/2019 at 3:49 PM

## 2019-09-11 NOTE — PROGRESS NOTES
"Pt had a high energy level and his movements were often intrusive, though he expressed an intention of helping people see what this therapist \"was trying to teach everyone\" about the value of movement. He often strutted through the center of the [place] and his movements were more of a performance for others to see than a creative personal expression. The quality of his movements and manner was incongruent with his words.  For example, he wanted everyone to \"express themselves\" and \"let the music lift their mood\" but his movements were overpowering to the expressions of others, and lacked a respect for the shared space that was safe for others (with 13 group members, he began break-dancing in the middle of the room.).  He also did not demonstrate respect for the needs of the patients who were less joyful than he wished for them, and their expressed needs were more related to cognitive organization than affect. Other patients did increase their participation due to his encouragement, though it was often in distracting side-conversations or increasing the volume of their verbal sharing.      Overall, pt expressed positive intentions, but his behaviors were destabilizing and scattering for the group process, and he lacked insight about his own behaviors.  At the end of the session, when peers were sharing their reflections, pt left the group without comment.      Pt did recognize this therapist from a previous hospitalization and asked to be called by a new name: Davian.  This was honored during the current session, but was a notably different personae than the identity previously expressed to this therapist.         09/11/19 1130   Dance Movement Therapy   Type of Intervention structured groups   Response participates with cues/redirection   Hours 1     "

## 2019-09-11 NOTE — PROGRESS NOTES
Patient agitated and hyperverbal at Transfer, but was able to take direction.   Pt progressively calmed through shift, following direction and polite to staff.   Pt declined HS meds., telling writer he did not feel he needed it [Zyprexa] at the moment.  (Pt had taken prn prior to transfer.
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
## 2019-09-11 NOTE — PROGRESS NOTES
"Pt refused lab draws this morning, despite writer offering education on the lab draws. Pt is hyperverbal and stated he does not want to be held inpatient any longer than the 72 HH. Pt believes after 72HH he will be discharged. Pt stated \"The only meds I need is marijuana, nicotine, and vaping.\"   "

## 2019-09-12 PROCEDURE — G0177 OPPS/PHP; TRAIN & EDUC SERV: HCPCS

## 2019-09-12 PROCEDURE — 99232 SBSQ HOSP IP/OBS MODERATE 35: CPT | Mod: GC | Performed by: PSYCHIATRY & NEUROLOGY

## 2019-09-12 PROCEDURE — 12400001 ZZH R&B MH UMMC

## 2019-09-12 PROCEDURE — 25000132 ZZH RX MED GY IP 250 OP 250 PS 637: Performed by: STUDENT IN AN ORGANIZED HEALTH CARE EDUCATION/TRAINING PROGRAM

## 2019-09-12 RX ADMIN — NICOTINE POLACRILEX 4 MG: 2 GUM, CHEWING BUCCAL at 13:50

## 2019-09-12 RX ADMIN — NICOTINE POLACRILEX 4 MG: 2 GUM, CHEWING BUCCAL at 22:01

## 2019-09-12 ASSESSMENT — ACTIVITIES OF DAILY LIVING (ADL)
LAUNDRY: WITH SUPERVISION
ORAL_HYGIENE: INDEPENDENT
DRESS: STREET CLOTHES;INDEPENDENT
HYGIENE/GROOMING: HANDWASHING;SHOWER;INDEPENDENT

## 2019-09-12 ASSESSMENT — MIFFLIN-ST. JEOR: SCORE: 1584.14

## 2019-09-12 NOTE — PROGRESS NOTES
"Patient was in milieu all shift.  Patient is often loud and grandiose.  Patient does accept redirection but needs frequent reminder.  Patient denies any mental health issues in one moment but will make statements \"I am so crazy.  I have so many mental health problems.  I don't know what's going on\".  Patient consistency denies SI/SIB.  Patient refused blood draw this morning and refused his medications.  Patient was able to sit for brief moments to play a card game with his peers.       09/12/19 1400   Behavioral Health   Hallucinations denies / not responding to hallucinations   Thinking distractable;poor concentration   Orientation person: oriented;place: oriented   Memory baseline memory   Insight poor   Judgement impaired   Eye Contact at examiner   Affect full range affect;irritable   Mood anxious;labile;grandiose   Physical Appearance/Attire attire appropriate to age and situation   Hygiene well groomed   Suicidality other (see comments)  (denies)   1. Wish to be Dead (Past Month) No   2. Non-Specific Active Suicidal Thoughts (Past Month) No   Self Injury other (see comment)  (denies)   Elopement Statements about wanting to leave   Activity hyperactive (agitated, impulsive)   Speech pressured;rambling;clear   Medication Sensitivity no stated side effects   Psychomotor / Gait balanced;steady   Occupational Therapy   Type of Intervention structured groups   Response Participates with cues/redirection   Hours 1   Psycho Education   Type of Intervention 1:1 intervention   Response participates, initiates socially appropriate   Hours 0.5   Treatment Detail check-in     "

## 2019-09-12 NOTE — PROGRESS NOTES
Brittany from [facility] court called, and reported to writer that pt is on a court hold as of 3:25 pm .

## 2019-09-12 NOTE — PROGRESS NOTES
09/11/19    Art Therapy   Type of Intervention structured groups   Response Participated with redirection   Hours 1   Treatment Detail Abstract acrylic painting/ jazz music   Problem-   Psychiatric diagnoses:   # psychotic disorder, unspecified   # r/o substance induced psychosis  # r/o bipolar type I, currently manic w/ psychotic features   # r/o schizophrenia  # r/o schizoaffective disorder, bipolar type, currently manic  # r/o cannabis use disorder  # Hx of ODD, ADHD    Goal- process, express, cope and regulate feelings and emotions through Art Therapy directives.     Outcome- Pt was present for group. He was grandiose and needed some reminders to not curse, put other peoples work down and to let others speak and that he should listen. The acrylic painting really did contain his own energy quite well, even though he is struggling with respect and boundaries. He did better than was reported in other groups and writer gave him encouragement for containing his energy in this way into his art. He did an abstract piece. He said his color was burnt orange and he said his piece had orange and silver , and he was pleased with it and it was a work in progress like he was a work in progress.

## 2019-09-12 NOTE — PLAN OF CARE
It was very challenging to integrate Hector' energy into the structured OT groups this date, though we began develop some initial therapeutic rapport around pt's interest in meditation techniques. His verbal expressions appeared mainly impulse-driven; very interruptive. Intense and tangential. Irritable.

## 2019-09-12 NOTE — PROGRESS NOTES
"Pt presents very labile and grandiose. Often loud rambling and tangential. \"I control the system, I don't let them control me!\" Disruptive in groups, loud, often talking over peer and staff. Numerous reminders. Began yelling at dinner for about 20 minutes, \"This isn't food, what the fuck man. How am I supposed to eat this shit!\" Pt did in fact order fruit tray on his menu. Pt called his mother and had a pizza and soda delivered. Freq profanity, numerous reminders. Oppositional often.        09/11/19 1900   Behavioral Health   Hallucinations denies / not responding to hallucinations   Thinking distractable;poor concentration;paranoid;delusional   Orientation person: oriented;place: oriented   Memory confabulation   Insight poor   Judgement impaired   Eye Contact at examiner   Affect irritable;tense;angry   Mood anxious;irritable;labile;grandiose   Physical Appearance/Attire attire appropriate to age and situation   Hygiene well groomed   1. Wish to be Dead (Past Month) No   2. Non-Specific Active Suicidal Thoughts (Past Month) No   Activity hyperactive (agitated, impulsive)   Speech pressured;rambling;clear;tangential   Psychomotor / Gait balanced;steady   Psycho Education   Type of Intervention structured groups   Response other (see comment)  (Too hyperkinetic and rambling)   Activities of Daily Living   Hygiene/Grooming independent   Oral Hygiene independent   Dress independent   Laundry with supervision   Room Organization independent     "

## 2019-09-13 ENCOUNTER — CARE COORDINATION (OUTPATIENT)
Dept: PSYCHIATRY | Facility: CLINIC | Age: 24
End: 2019-09-13

## 2019-09-13 PROCEDURE — 99232 SBSQ HOSP IP/OBS MODERATE 35: CPT | Mod: GC | Performed by: PSYCHIATRY & NEUROLOGY

## 2019-09-13 PROCEDURE — 25000132 ZZH RX MED GY IP 250 OP 250 PS 637: Performed by: STUDENT IN AN ORGANIZED HEALTH CARE EDUCATION/TRAINING PROGRAM

## 2019-09-13 PROCEDURE — H2032 ACTIVITY THERAPY, PER 15 MIN: HCPCS

## 2019-09-13 PROCEDURE — 12400001 ZZH R&B MH UMMC

## 2019-09-13 PROCEDURE — G0177 OPPS/PHP; TRAIN & EDUC SERV: HCPCS

## 2019-09-13 RX ORDER — QUETIAPINE FUMARATE 25 MG/1
25 TABLET, FILM COATED ORAL 3 TIMES DAILY PRN
Status: DISCONTINUED | OUTPATIENT
Start: 2019-09-13 | End: 2019-09-24 | Stop reason: HOSPADM

## 2019-09-13 RX ADMIN — NICOTINE POLACRILEX 4 MG: 2 GUM, CHEWING BUCCAL at 08:53

## 2019-09-13 ASSESSMENT — ACTIVITIES OF DAILY LIVING (ADL)
ORAL_HYGIENE: INDEPENDENT
DRESS: INDEPENDENT;SCRUBS (BEHAVIORAL HEALTH)
HYGIENE/GROOMING: INDEPENDENT
LAUNDRY: WITH SUPERVISION
DRESS: INDEPENDENT
HYGIENE/GROOMING: INDEPENDENT
ORAL_HYGIENE: INDEPENDENT

## 2019-09-13 NOTE — PROGRESS NOTES
Pt was hyperactive throughout the day, highly social with peers, and attended groups. Pt required redirection multiple times for language and loud vocals. Pt was observed jumping over chairs in the lounge and sliding onto the ground in the lounge. Pt became upset when he was excused from group, able to eventually calm down. Pt had a tense conversation with his mother on the phone, denied circumstances that led to his admission.       09/13/19 7208   Behavioral Health   Hallucinations denies / not responding to hallucinations   Thinking distractable;poor concentration   Orientation person: oriented;place: oriented;date: oriented   Memory baseline memory   Insight poor   Judgement impaired   Eye Contact at examiner   Affect full range affect   Mood labile;anxious;grandiose   Physical Appearance/Attire attire appropriate to age and situation   Hygiene well groomed   Suicidality other (see comments)  (denies)   1. Wish to be Dead (Past Month) No   2. Non-Specific Active Suicidal Thoughts (Past Month) No   Self Injury other (see comment)  (denies)   Elopement Statements about wanting to leave   Activity hyperactive (agitated, impulsive)   Speech pressured;rambling   Medication Sensitivity no stated side effects;no observed side effects   Psychomotor / Gait balanced;steady;hyperactive   Activities of Daily Living   Hygiene/Grooming independent   Oral Hygiene independent   Dress independent;scrubs (behavioral health)   Laundry with supervision   Room Organization independent   Activity   Activity Assistance Provided independent

## 2019-09-13 NOTE — PROGRESS NOTES
"Patient refused am scheduled olanzapine dose, stating his mom told him not to take it. Speech rambling, circumstantial, talking about being part of computer game and that he saw a show on [employer] about it. Elated mood, smiling and laughing with peers at breakfast. He also declined all lab collection today--both blood draw and urine. Resident Dr. Newby informed and wants to continue to try to obtain labs daily at this time.     He talked about someone named Vivian who was killed prior to patient being admitted. Stated he didn't really know him, other than giving Vivian \"less nugs than he was trying to steal from me\", but that Vivian was a rapper and that patient found out later (after Vivian ) that one of Vivian's songs was about patient because of a hashtag \"\" patient read on social media. Patient stated that made Vivian's death have more meaning for him.     Mom called to check how patient was doing today (BATSHEVA signed). She stated patient told her what his sx were was that he is experiencing nicotine withdrawal due to large amounts of nicotine he was using from prior to admission. She also states patient shared with her he used \"nasal salts\" for vaping prior to admission and mom wondered if that was something affecting his behavior. Did mention to mom that patient was reporting that he was not taking meds based on her instruction. She stated she had only told him to only take what he was comfortable with, as both she and Alexi are very sensitive to medications.   "

## 2019-09-13 NOTE — PLAN OF CARE
"Natalio has been talking non- stop this evening. He has made a few grandiose statements, such as he has a photographic memory, etc. He refused zyprexa at . Said \"I don't take anything here that I wouldn't take on the street.\" He added that his mother knows that he should not take zyprexa. He declined a 1:1, saying the only thing this nurse could do is offer his mother some comfort as he had already said good night to her. This nurse told him that we would be happy to talk to her if she calls. His affect is bright. He is social with peers and staff.   "
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
## 2019-09-13 NOTE — PROGRESS NOTES
INITIAL O.T. ASSESSMENT:  Natalio has attended OT several times since admission.    Pressured speech, interruptive of peers w moderate insight about this behavior.  Loose comments, intense.     Was given a written self-assessment, does not currently have the attention span to complete it.     Plan: Encourage ongoing attendance as able to meet self-stated goals, implement positive coping skills, engage in graded opportunities for success, and provide assessment ongoing.       09/13/19 1300   Clinical Impression   Affect Excessive, manic   Orientation Oriented to person;Oriented to place;Needs further assessment   Appearance and ADLs General cleanliness observed in most areas   Attention to Internal Stimuli Needs further assessment   Interaction Skills Intrusive   Ability to Communicate Needs Intrusive;Demanding   Verbal Content Tangential;Hyperverbal;Pressured   Ability to Maintain Boundaries Unable to redirect;Needs further assessment   Participation Independently participates   Concentration Concentrates <5 minutes;Needs further assessment   Ability to Concentrate Easily distracted;Unable to concentrate   Follows and Comprehends Directions Follows 1 step with repeats;Unable to follow any direction   Memory Needs further assessment   Organization Disorganized;Needs further assessment   Decision Making Impulsive;Changes mind frequently   Planning and Problem Solving Impulsive;Needs further assessment   Ability to Apply and Learn Concepts Unable to apply;Needs further assessment   Frustrations / Stress Tolerance Easily frustrated;Needs further assessment   Level of Insight No insight;Needs further assessment   Self Esteem Needs further assessment   Social Supports Needs further assessment

## 2019-09-13 NOTE — PROGRESS NOTES
Re: MI Commitment    Writer received voicemail message from Dr. Carney with [facility] stating they are supporting petition. Court hold will be called to unit by [facility] court staff when order is complete per [facility] process. RN to notify team physician when hold is called to unit.    Cristine Bunn, LPCC, LADC
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
## 2019-09-13 NOTE — PROGRESS NOTES
Re: MI Commitment    Exam date: 9/17/19 @ 1415  Hearing date: 9/20/19 @ [facility] [place] Page, KERRY MAKI

## 2019-09-13 NOTE — PROGRESS NOTES
Behavioral Health  Note   Behavioral Health  Spirituality Group Note     Unit 22    Name: Natalio Rodas    YOB: 1995   MRN: [id number]    Age: 23 year old     Patient attended -led group, which included discussion of spirituality, coping with illness and building resilience.   Patient attended group for 1.0 hrs.   The patient actively participated in group discussion. However, pt needs redirection during group discussion. Pt a lot interrupt while the other pt talks. Pt is polite and willing to correct his mistake too.      [facility]  Staff    Page [phone]

## 2019-09-14 PROCEDURE — 25000132 ZZH RX MED GY IP 250 OP 250 PS 637: Performed by: STUDENT IN AN ORGANIZED HEALTH CARE EDUCATION/TRAINING PROGRAM

## 2019-09-14 PROCEDURE — 12400001 ZZH R&B MH UMMC

## 2019-09-14 RX ADMIN — NICOTINE POLACRILEX 4 MG: 2 GUM, CHEWING BUCCAL at 13:24

## 2019-09-14 RX ADMIN — NICOTINE POLACRILEX 2 MG: 2 GUM, CHEWING BUCCAL at 08:56

## 2019-09-14 ASSESSMENT — ACTIVITIES OF DAILY LIVING (ADL)
DRESS: INDEPENDENT
DRESS: INDEPENDENT
HYGIENE/GROOMING: INDEPENDENT
LAUNDRY: WITH SUPERVISION
ORAL_HYGIENE: INDEPENDENT
HYGIENE/GROOMING: INDEPENDENT
ORAL_HYGIENE: INDEPENDENT

## 2019-09-14 NOTE — PLAN OF CARE
Alexi continues pressured and tangential speech-irritable at times, but overall pleasant and somewhat euphoric mood-interacting with others most of day-redirection at times for intrusive behaviors-brief conflict with peer this AM, but walked away and was able to return and work through issue with peer-Alexi continues to refuse scheduled Zyprexa

## 2019-09-14 NOTE — PROGRESS NOTES
Pt restless/hyperactive throughout shift, pressured speech, required redirection for loud inappropriate conversations, but followed redirection well and had no significant behavioral outbursts this shift. Pt is grandiose at times. Denies significant MI, rather states they had a lot of stuff going on and that they just needed a few days to calm their mood down.      09/13/19 2200   Behavioral Health   Hallucinations denies / not responding to hallucinations   Thinking poor concentration   Orientation person: oriented;place: oriented;date: oriented;time: oriented   Memory baseline memory   Insight poor   Judgement impaired   Eye Contact at examiner   Affect full range affect   Mood anxious;grandiose   Physical Appearance/Attire attire appropriate to age and situation   Hygiene well groomed   Suicidality other (see comments)  (denies )   1. Wish to be Dead (Past Month) No   2. Non-Specific Active Suicidal Thoughts (Past Month) No   Self Injury other (see comment)  (denies )   Elopement Statements about wanting to leave   Activity restless;hyperactive (agitated, impulsive)   Speech pressured   Medication Sensitivity no observed side effects   Psychomotor / Gait balanced;steady   Therapeutic Recreation   Type of Intervention structured groups   Activity game   Response Participates with cues/redirection   Hours 0.5   Psycho Education   Type of Intervention 1:1 intervention   Response participates, initiates socially appropriate   Hours 0.5   Treatment Detail check in    Activities of Daily Living   Hygiene/Grooming independent   Oral Hygiene independent   Dress independent   Room Organization independent   Activity   Activity Assistance Provided independent

## 2019-09-14 NOTE — PROGRESS NOTES
09/13/19 2200   Therapeutic Recreation   Type of Intervention structured groups   Activity game   Response Participates with cues/redirection   Hours 0.5     Pt participated in Therapeutic Recreation group with focus on leisure participation, social engagement, and strategic cognitive reasoning.  Engaged and focused in the group recreational intervention via a group game.  Pt was a full participant for approximately half of group, leaving for some time in the middle of the activity. Showed progress in session goals. Pt was hyper-verbal throughout the group, often distracting others. Pt seemed to get upset during his turn, when he wasn't correctly guessing the clue. Pt got defensive when others gave feedback and tried to help him figure it out. Pt took another turn and successfully guessed correctly. Pt seemed to brighten up again after success in the game.

## 2019-09-15 PROCEDURE — 12400001 ZZH R&B MH UMMC

## 2019-09-15 PROCEDURE — H2032 ACTIVITY THERAPY, PER 15 MIN: HCPCS

## 2019-09-15 PROCEDURE — 25000132 ZZH RX MED GY IP 250 OP 250 PS 637: Performed by: STUDENT IN AN ORGANIZED HEALTH CARE EDUCATION/TRAINING PROGRAM

## 2019-09-15 RX ADMIN — NICOTINE POLACRILEX 4 MG: 2 GUM, CHEWING BUCCAL at 13:24

## 2019-09-15 RX ADMIN — NICOTINE POLACRILEX 2 MG: 2 GUM, CHEWING BUCCAL at 09:17

## 2019-09-15 RX ADMIN — NICOTINE POLACRILEX 2 MG: 2 GUM, CHEWING BUCCAL at 22:04

## 2019-09-15 RX ADMIN — NICOTINE POLACRILEX 2 MG: 2 GUM, CHEWING BUCCAL at 06:39

## 2019-09-15 ASSESSMENT — ACTIVITIES OF DAILY LIVING (ADL)
HYGIENE/GROOMING: INDEPENDENT
LAUNDRY: WITH SUPERVISION
ORAL_HYGIENE: INDEPENDENT
ORAL_HYGIENE: INDEPENDENT
DRESS: INDEPENDENT
DRESS: INDEPENDENT
HYGIENE/GROOMING: INDEPENDENT

## 2019-09-15 ASSESSMENT — MIFFLIN-ST. JEOR: SCORE: 1591.86

## 2019-09-15 NOTE — PLAN OF CARE
"Alexi continues active on unit, but less intrusive as compared to previous days-continues pressured speech, but less constant and improved ability to engage in conversation-receptive to redirection in community mtg-able to remain in grp and allow others to speak-in 1:1 expressed feeling it is unfair he is hospitalized rather than cousin, complaining, \"He smokes way more weed than do! I know I have my delusions and stuff, but he is way more mentally ill than I am! He once locked himself in his room for a whole week! I'm really angry he told me to meet him at his job when he knew he wasn't going to be there!\"-Alexi made contradictory statements regarding marijuana stating it is therapeutic and he needs it to self tx anxiety sxs and then stating you never know what's in it when you buy on the street and I'm never going to use again-continues grandiose statements, but less freq-more receptive to input of others today, but did have one argument with male peer regarding rules of game they were playing-continues to refuse medication-Addendum-this afternoon more irritable-angry conflict with peers that started with political conversation-removed self from conversation and went to room for brief period  "

## 2019-09-15 NOTE — PROGRESS NOTES
Pt had no behavioral outbursts this shift although requires continual redirection for staff for inappropriate conversations, language, and loud speech. Pt is agreeable to feedback when given, but continues having same behavior a couple minutes later. Pt was restless throughout shift, and seemed to have a large amount of energy. Per other staff pt was observed possibly responding to internal stimuli, pt was laughing and talking to self. Pt continues to be grandiose but not overtly delusional with pressured speech throughout entire shift. Continues to deny need for hospitalization or neuroleptic medications.       09/14/19 2100   Behavioral Health   Hallucinations denies / not responding to hallucinations   Thinking distractable   Orientation person: oriented;place: oriented;date: oriented;time: oriented   Memory baseline memory   Insight poor   Judgement impaired   Eye Contact at examiner   Affect full range affect   Mood grandiose   Physical Appearance/Attire attire appropriate to age and situation   Hygiene well groomed   Suicidality other (see comments)  (denies )   1. Wish to be Dead (Past Month) No   2. Non-Specific Active Suicidal Thoughts (Past Month) No   Self Injury other (see comment)  (denies)   Elopement Statements about wanting to leave   Activity hyperactive (agitated, impulsive)   Speech pressured;rambling   Medication Sensitivity no stated side effects;no observed side effects   Psychomotor / Gait balanced;steady   Psycho Education   Type of Intervention 1:1 intervention   Response participates, initiates socially appropriate   Hours 0.5   Treatment Detail check in    Activities of Daily Living   Hygiene/Grooming independent   Oral Hygiene independent   Dress independent   Room Organization independent   Activity   Activity Assistance Provided independent

## 2019-09-16 PROCEDURE — 99232 SBSQ HOSP IP/OBS MODERATE 35: CPT | Mod: GC | Performed by: PSYCHIATRY & NEUROLOGY

## 2019-09-16 PROCEDURE — H2032 ACTIVITY THERAPY, PER 15 MIN: HCPCS

## 2019-09-16 PROCEDURE — G0177 OPPS/PHP; TRAIN & EDUC SERV: HCPCS

## 2019-09-16 PROCEDURE — 25000132 ZZH RX MED GY IP 250 OP 250 PS 637: Performed by: STUDENT IN AN ORGANIZED HEALTH CARE EDUCATION/TRAINING PROGRAM

## 2019-09-16 PROCEDURE — 12400001 ZZH R&B MH UMMC

## 2019-09-16 RX ORDER — VIT E ACET/GLY/DIMETH/WATER
LOTION (ML) TOPICAL 2 TIMES DAILY PRN
Status: DISCONTINUED | OUTPATIENT
Start: 2019-09-16 | End: 2019-09-24 | Stop reason: HOSPADM

## 2019-09-16 RX ADMIN — NICOTINE POLACRILEX 4 MG: 2 GUM, CHEWING BUCCAL at 19:25

## 2019-09-16 RX ADMIN — NICOTINE POLACRILEX 2 MG: 2 GUM, CHEWING BUCCAL at 08:06

## 2019-09-16 ASSESSMENT — ACTIVITIES OF DAILY LIVING (ADL)
HYGIENE/GROOMING: INDEPENDENT
ORAL_HYGIENE: INDEPENDENT
DRESS: SCRUBS (BEHAVIORAL HEALTH);INDEPENDENT

## 2019-09-16 NOTE — PROGRESS NOTES
CTC met with patient at his request to discuss some inaccurate information on his commitment paperwork.  He indicates he has spoken to his  about this as well and  was planning to get this straightened out.  Patient talked about his goals for the day being to speak to , CTC and doctor.  Part way through this visit, the psychiatric attending and resident doctor did also check in with patient and he was pleased that he got all 3 of his goals accomplished in the morning.  He declined the offer of scheduled medication, indicating he doesn't think he needs meds.  He states he will be able to stay with his father after discharge and that he will have no need for mental health providers.  Court exam tomorrow.

## 2019-09-16 NOTE — PROGRESS NOTES
Participated with redirection in Music Therapy group with focus on mood elevation, validation and decreasing anxiety and improved group cohesiveness. Engaged and cooperative in music listening interventions.   Showed progress in session goals.  Showed an expansive mood, talkative.
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
## 2019-09-16 NOTE — PLAN OF CARE
"Pt is active and visible in the milieu; mood elevated and loud at times; social with peers and staff; took shower and well groomed; pt stated he accomplished his goal for this shift, met his care team, talk to his  over the phone  and ( ) this shift; continue denying mental health symptom and taking his scheduled medication stating, \" I am in good space, physically and mentally fit, I don't need medicatrion but spiritual \"; denied having SI/SIB, A/VH, HI at this time; pt reported looking forward for court exam tomorrow; pt noted making phone call to his father in the hallway. Demanding and irritable if demand didn't met as he requested as evidenced by throwing cheese stick in the hallway when this writer asked pt to wait in line while help is in progress with other pt. Pt at times need limitation set not to overshadow other pts privilege. Pt attended art group but missed goal setting group;  Will continue monitoring safety and provide therapeutic environment.         "
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
## 2019-09-16 NOTE — PLAN OF CARE
"Emmanuel   attended 1 of 3 OT groups today. He agreed not to attend the a.m. discussion session given his pressured and hyperverbal state; Was irritable with this writer throughout the a.m. for \"kicking [him] out of group.\" Slightly calmer & less expansive this afternoon. Worked collaboratively with peers on an art mural. Still the most verbally active participant in group & soaks up a lot of the facilitator's attention.     "

## 2019-09-17 LAB
ALBUMIN SERPL-MCNC: 4.1 G/DL (ref 3.4–5)
ALP SERPL-CCNC: 97 U/L (ref 40–150)
ALT SERPL W P-5'-P-CCNC: 36 U/L (ref 0–70)
ANION GAP SERPL CALCULATED.3IONS-SCNC: 5 MMOL/L (ref 3–14)
AST SERPL W P-5'-P-CCNC: 22 U/L (ref 0–45)
BASOPHILS # BLD AUTO: 0 10E9/L (ref 0–0.2)
BASOPHILS NFR BLD AUTO: 0.6 %
BILIRUB SERPL-MCNC: 0.3 MG/DL (ref 0.2–1.3)
BUN SERPL-MCNC: 14 MG/DL (ref 7–30)
CALCIUM SERPL-MCNC: 9.1 MG/DL (ref 8.5–10.1)
CHLORIDE SERPL-SCNC: 106 MMOL/L (ref 94–109)
CHOLEST SERPL-MCNC: 165 MG/DL
CO2 SERPL-SCNC: 28 MMOL/L (ref 20–32)
CREAT SERPL-MCNC: 0.82 MG/DL (ref 0.66–1.25)
DIFFERENTIAL METHOD BLD: NORMAL
EOSINOPHIL # BLD AUTO: 0.1 10E9/L (ref 0–0.7)
EOSINOPHIL NFR BLD AUTO: 1.5 %
ERYTHROCYTE [DISTWIDTH] IN BLOOD BY AUTOMATED COUNT: 13.1 % (ref 10–15)
FOLATE SERPL-MCNC: 11.7 NG/ML
GFR SERPL CREATININE-BSD FRML MDRD: >90 ML/MIN/{1.73_M2}
GLUCOSE SERPL-MCNC: 77 MG/DL (ref 70–99)
HCT VFR BLD AUTO: 49.8 % (ref 40–53)
HDLC SERPL-MCNC: 68 MG/DL
HGB BLD-MCNC: 16.1 G/DL (ref 13.3–17.7)
IMM GRANULOCYTES # BLD: 0 10E9/L (ref 0–0.4)
IMM GRANULOCYTES NFR BLD: 0.3 %
LDLC SERPL CALC-MCNC: 85 MG/DL
LYMPHOCYTES # BLD AUTO: 1.7 10E9/L (ref 0.8–5.3)
LYMPHOCYTES NFR BLD AUTO: 26 %
MCH RBC QN AUTO: 30.3 PG (ref 26.5–33)
MCHC RBC AUTO-ENTMCNC: 32.3 G/DL (ref 31.5–36.5)
MCV RBC AUTO: 94 FL (ref 78–100)
MONOCYTES # BLD AUTO: 0.5 10E9/L (ref 0–1.3)
MONOCYTES NFR BLD AUTO: 6.9 %
NEUTROPHILS # BLD AUTO: 4.2 10E9/L (ref 1.6–8.3)
NEUTROPHILS NFR BLD AUTO: 64.7 %
NONHDLC SERPL-MCNC: 97 MG/DL
NRBC # BLD AUTO: 0 10*3/UL
NRBC BLD AUTO-RTO: 0 /100
PLATELET # BLD AUTO: 320 10E9/L (ref 150–450)
POTASSIUM SERPL-SCNC: 4.8 MMOL/L (ref 3.4–5.3)
PROT SERPL-MCNC: 8.1 G/DL (ref 6.8–8.8)
RBC # BLD AUTO: 5.31 10E12/L (ref 4.4–5.9)
SODIUM SERPL-SCNC: 139 MMOL/L (ref 133–144)
TRIGL SERPL-MCNC: 60 MG/DL
TSH SERPL DL<=0.005 MIU/L-ACNC: 1.27 MU/L (ref 0.4–4)
VIT B12 SERPL-MCNC: 989 PG/ML (ref 193–986)
WBC # BLD AUTO: 6.5 10E9/L (ref 4–11)

## 2019-09-17 PROCEDURE — G0177 OPPS/PHP; TRAIN & EDUC SERV: HCPCS

## 2019-09-17 PROCEDURE — 36415 COLL VENOUS BLD VENIPUNCTURE: CPT | Performed by: PSYCHIATRY & NEUROLOGY

## 2019-09-17 PROCEDURE — 84443 ASSAY THYROID STIM HORMONE: CPT | Performed by: PSYCHIATRY & NEUROLOGY

## 2019-09-17 PROCEDURE — 25000132 ZZH RX MED GY IP 250 OP 250 PS 637: Performed by: STUDENT IN AN ORGANIZED HEALTH CARE EDUCATION/TRAINING PROGRAM

## 2019-09-17 PROCEDURE — 82607 VITAMIN B-12: CPT | Performed by: PSYCHIATRY & NEUROLOGY

## 2019-09-17 PROCEDURE — 99232 SBSQ HOSP IP/OBS MODERATE 35: CPT | Mod: GC | Performed by: PSYCHIATRY & NEUROLOGY

## 2019-09-17 PROCEDURE — 80053 COMPREHEN METABOLIC PANEL: CPT | Performed by: PSYCHIATRY & NEUROLOGY

## 2019-09-17 PROCEDURE — 80061 LIPID PANEL: CPT | Performed by: PSYCHIATRY & NEUROLOGY

## 2019-09-17 PROCEDURE — 82746 ASSAY OF FOLIC ACID SERUM: CPT | Performed by: PSYCHIATRY & NEUROLOGY

## 2019-09-17 PROCEDURE — 82306 VITAMIN D 25 HYDROXY: CPT | Performed by: PSYCHIATRY & NEUROLOGY

## 2019-09-17 PROCEDURE — 85025 COMPLETE CBC W/AUTO DIFF WBC: CPT | Performed by: PSYCHIATRY & NEUROLOGY

## 2019-09-17 PROCEDURE — 12400001 ZZH R&B MH UMMC

## 2019-09-17 RX ADMIN — NICOTINE POLACRILEX 2 MG: 2 GUM, CHEWING BUCCAL at 20:52

## 2019-09-17 RX ADMIN — EMOLLIENT - LOTION: LOTION at 07:09

## 2019-09-17 RX ADMIN — ACETAMINOPHEN 650 MG: 325 TABLET, FILM COATED ORAL at 20:52

## 2019-09-17 RX ADMIN — NICOTINE POLACRILEX 2 MG: 2 GUM, CHEWING BUCCAL at 07:06

## 2019-09-17 RX ADMIN — NICOTINE POLACRILEX 2 MG: 2 GUM, CHEWING BUCCAL at 16:04

## 2019-09-17 RX ADMIN — HYDROXYZINE HYDROCHLORIDE 25 MG: 25 TABLET, FILM COATED ORAL at 20:52

## 2019-09-17 RX ADMIN — NICOTINE POLACRILEX 2 MG: 2 GUM, CHEWING BUCCAL at 12:28

## 2019-09-17 RX ADMIN — OLANZAPINE 10 MG: 10 TABLET, ORALLY DISINTEGRATING ORAL at 20:52

## 2019-09-17 RX ADMIN — NICOTINE POLACRILEX 2 MG: 2 GUM, CHEWING BUCCAL at 18:52

## 2019-09-17 ASSESSMENT — ACTIVITIES OF DAILY LIVING (ADL)
HYGIENE/GROOMING: INDEPENDENT
LAUNDRY: UNABLE TO COMPLETE
DRESS: INDEPENDENT
ORAL_HYGIENE: INDEPENDENT

## 2019-09-17 ASSESSMENT — MIFFLIN-ST. JEOR: SCORE: 1591.86

## 2019-09-17 NOTE — PROGRESS NOTES
patient gone for much of the shift due to court. No issues observed nor reported, with patient spending most of the shift socializing with peers. He required a few redirects from loud language, but accepted them gracefully and did not present any major problems this shift.

## 2019-09-17 NOTE — PROGRESS NOTES
Pt was in the milieu when he got back from court. Pt was irritable and angry this evening. Pt's mood was tense and irritated most of the night. Pt said he is ready to get out of the hospital. Pt stated he was angry about court today. Pt said if he gets the meds jarvased, he will not be taking them. Pt denies SI, SIB, HI or other mental health symptoms.        09/17/19 1852   Behavioral Health   Hallucinations denies / not responding to hallucinations   Thinking distractable;paranoid;poor concentration   Orientation person: oriented;place: oriented;date: oriented;time: oriented   Memory baseline memory   Insight poor   Judgement impaired   Eye Contact at examiner;at floor;into space   Affect angry;tense;irritable   Mood hopeless;irritable   Physical Appearance/Attire appears stated age;attire appropriate to age and situation   Hygiene other (see comment)  (Pt did not shower this shift, does not smell of SARTHAK)   1. Wish to be Dead (Past Month) No   2. Non-Specific Active Suicidal Thoughts (Past Month) No   Psycho Education   Type of Intervention 1:1 intervention   Response participates with cues/redirection   Hours 0.5   Activities of Daily Living   Hygiene/Grooming independent   Oral Hygiene independent   Dress independent   Laundry unable to complete   Room Organization independent
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
## 2019-09-17 NOTE — PROGRESS NOTES
Pt was present and social (albeit sometimes inappropriately social) in the milieu throughout the shift. Pt was observed socializing with staff/peers, talking on the phone, attending the OT group, eating dinner, and watching TV. Pt presented with a fluctuating affect but his mood appear more stable than when first admitted. Pt remains mildly disruptive in milieu but overall appears calmer and more redirectable. Pt denies experiencing any mental health symptoms including SI, HI, or SIB.        09/16/19 5240   Behavioral Health   Hallucinations denies / not responding to hallucinations   Thinking distractable;poor concentration   Orientation person: oriented;place: oriented   Memory   (appears baseline)   Insight poor   Judgement impaired   Eye Contact at examiner   Affect tense;full range affect   Mood labile   Physical Appearance/Attire attire appropriate to age and situation   Hygiene   (adequate)   Suicidality   (pt denies thoughts and urges)   Self Injury   (pt denies thoughts and urges)   Elopement Statements about wanting to leave   Activity restless   Speech pressured;tangential;clear;coherent   Medication Sensitivity no stated side effects;no observed side effects   Psychomotor / Gait balanced;steady   Psycho Education   Type of Intervention 1:1 intervention   Response participates, initiates socially appropriate   Hours 0.5   Treatment Detail   (check-in)   Activities of Daily Living   Hygiene/Grooming independent   Oral Hygiene independent   Dress scrubs (behavioral health);independent   Laundry   (pt did not do laundry)   Room Organization independent   Activity   Activity Assistance Provided independent

## 2019-09-17 NOTE — PLAN OF CARE
BEHAVIORAL TEAM DISCUSSION    Participants:   Dr. Nunn, Attending Psychiatrist  Aneta Blancas, PGY-2  Kirsten Newby MD PGY-1  Cristine Bunn, LPCC, LADC, CTC  Rita Patino, CARLIN Acosta, Pharmacy  Venancio Sharma, OT  Aleah Rodriguez, MS3  Ellen Puneet, MS3  Daiana Luthershefali, MS3  Yasmine Elaina, MS3  Progress: Initial  Anticipated length of stay: 1-2 weeks  Continued Stay Criteria/Rationale: Patient admitted for psychosis - [facility] [place] Commitment process in place.  Medical/Physical: No acute issues  Precautions:   Behavioral Orders   Procedures     Assault precautions     Code 1 - Restrict to Unit     Elopement precautions     Routine Programming     As clinically indicated     Single Room     Status 15     Every 15 minutes.   Plan: Dispo pending MI committment  Rationale for change in precautions or plan: No change at present, will continue to monitor and adjust as needed.

## 2019-09-17 NOTE — PROGRESS NOTES
CLINICAL NUTRITION SERVICES - BRIEF NOTE    Reason for Evaluation: Patient/Family Request    Intake: Pt reports still being hungry after meals and wants to gain wt.    Wt Readings from Last 1 Encounters:   09/17/19 61.2 kg (135 lb)   BMI: 20.53 kg/m2    Interventions  - Order double portions  - Order Boost Plus with TID meals (various flavors)    Follow Up/ Monitoring  No nutrition follow-up warranted at this time. RD to sign off. Please consult if further needs arise.         Arnia Lomeli RD, LD  Pager: [phone]

## 2019-09-17 NOTE — PROGRESS NOTES
"   09/16/19 2000   General Information   Art Directive other (see comments)   AT directive is to \"draw your heart\" using art medium of pts choice. Goals of directive: to create a heart as a container for emotional expression, trauma containment. To identify personal strengths and goals. Pt was a positive participant, focused on task for the full duration of group. Pt shared with group that he learned today that his grandfather had passed away. Pt processed some feelings about this in his artwork, creating an abstract piece with black bars on the far right and far left of page representing the \"cycle of life\" Pt used other color and symbols to represent positive personal characteristics. Pts mood was calm.   "

## 2019-09-17 NOTE — PLAN OF CARE
Natalio  attended 2 of 3 OT groups today.  Worked collaboratively on this week's structured group art project (a unit mural). Voiced strong opinions about the work of others; some difficulty letting others have their physical space & some silence to focus. Continues to present w pressured, nearly incessant speech.

## 2019-09-18 LAB — DEPRECATED CALCIDIOL+CALCIFEROL SERPL-MC: 20 UG/L (ref 20–75)

## 2019-09-18 PROCEDURE — 25000132 ZZH RX MED GY IP 250 OP 250 PS 637: Performed by: STUDENT IN AN ORGANIZED HEALTH CARE EDUCATION/TRAINING PROGRAM

## 2019-09-18 PROCEDURE — H2032 ACTIVITY THERAPY, PER 15 MIN: HCPCS

## 2019-09-18 PROCEDURE — 12400001 ZZH R&B MH UMMC

## 2019-09-18 PROCEDURE — 99232 SBSQ HOSP IP/OBS MODERATE 35: CPT | Mod: GC | Performed by: PSYCHIATRY & NEUROLOGY

## 2019-09-18 RX ADMIN — NICOTINE POLACRILEX 4 MG: 2 GUM, CHEWING BUCCAL at 19:34

## 2019-09-18 RX ADMIN — NICOTINE POLACRILEX 4 MG: 2 GUM, CHEWING BUCCAL at 09:22

## 2019-09-18 RX ADMIN — HYDROXYZINE HYDROCHLORIDE 25 MG: 25 TABLET, FILM COATED ORAL at 09:26

## 2019-09-18 RX ADMIN — ACETAMINOPHEN 650 MG: 325 TABLET, FILM COATED ORAL at 09:26

## 2019-09-18 RX ADMIN — OLANZAPINE 10 MG: 10 TABLET, ORALLY DISINTEGRATING ORAL at 09:22

## 2019-09-18 ASSESSMENT — ACTIVITIES OF DAILY LIVING (ADL)
HYGIENE/GROOMING: INDEPENDENT
DRESS: INDEPENDENT
LAUNDRY: WITH SUPERVISION
HYGIENE/GROOMING: INDEPENDENT
ORAL_HYGIENE: INDEPENDENT
DRESS: INDEPENDENT
ORAL_HYGIENE: INDEPENDENT

## 2019-09-18 NOTE — PLAN OF CARE
"Alexi very angry this AM-telling several staff members to \"Stay the fuck away from me!\" and slamming room door-did approach med window asking for medications, but then went in shower-later angry when approached with meds, although accepted medication readily-post short period able to discuss with staff-complained he slept late,which is annoying to him, \"I usually get up at six o'clock! I almost missed bkft!-Alexi complained Zyprexa makes him \"not myself\"-c/o low energy, blunted emotions, excessive sleep, \"I lose my personality!\"-Alexi less irritable as day progressed-pressured speech, but improved ability to redirect self-Adendum:angry outburst when lunch arrived with one portion VS two, despite staff calling for second portion         "
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
## 2019-09-18 NOTE — PROGRESS NOTES
Re: MI Commitment    Writer received court hold paperwork that was faxed to station 20. Patient's trial is scheduled for Friday 9/20/19 at 2:15pm. MD needs to be available by phone for testimony. Information provided to MD. Pager number and provider name provided to .    Cristine Bunn, ALLENC, LADC

## 2019-09-19 PROCEDURE — 12400001 ZZH R&B MH UMMC

## 2019-09-19 PROCEDURE — G0177 OPPS/PHP; TRAIN & EDUC SERV: HCPCS

## 2019-09-19 PROCEDURE — 25000132 ZZH RX MED GY IP 250 OP 250 PS 637: Performed by: PSYCHIATRY & NEUROLOGY

## 2019-09-19 PROCEDURE — 90853 GROUP PSYCHOTHERAPY: CPT

## 2019-09-19 PROCEDURE — 25000132 ZZH RX MED GY IP 250 OP 250 PS 637: Performed by: STUDENT IN AN ORGANIZED HEALTH CARE EDUCATION/TRAINING PROGRAM

## 2019-09-19 PROCEDURE — 99232 SBSQ HOSP IP/OBS MODERATE 35: CPT | Performed by: PSYCHIATRY & NEUROLOGY

## 2019-09-19 RX ORDER — LITHIUM CARBONATE 300 MG/1
600 TABLET, FILM COATED, EXTENDED RELEASE ORAL AT BEDTIME
Status: DISCONTINUED | OUTPATIENT
Start: 2019-09-19 | End: 2019-09-24 | Stop reason: HOSPADM

## 2019-09-19 RX ADMIN — NICOTINE POLACRILEX 2 MG: 2 GUM, CHEWING BUCCAL at 13:21

## 2019-09-19 RX ADMIN — NICOTINE POLACRILEX 4 MG: 2 GUM, CHEWING BUCCAL at 22:21

## 2019-09-19 RX ADMIN — NICOTINE POLACRILEX 2 MG: 2 GUM, CHEWING BUCCAL at 11:22

## 2019-09-19 RX ADMIN — NICOTINE POLACRILEX 2 MG: 2 GUM, CHEWING BUCCAL at 08:10

## 2019-09-19 RX ADMIN — LITHIUM CARBONATE 600 MG: 300 TABLET, EXTENDED RELEASE ORAL at 19:01

## 2019-09-19 RX ADMIN — NICOTINE POLACRILEX 4 MG: 2 GUM, CHEWING BUCCAL at 19:02

## 2019-09-19 ASSESSMENT — MIFFLIN-ST. JEOR: SCORE: 1607.73

## 2019-09-19 NOTE — PROGRESS NOTES
"Patient slept 3.75 hours overnight. Patient had difficulty falling asleep. Patient came to workstation and asked staff to print out a \"Shweta and Stitch\" coloring sketch. Patient worked on coloring the sketch in the lounge for a good hour. Patient also listening to headphones. Patient sated that he had a \"really bad day\". Patient friendly upon approach.  "

## 2019-09-19 NOTE — PLAN OF CARE
"Alexi speech less pressured-agreeable to trying lithium-continues to decline urine for drug screen-states, \"The only thing that's going to be in my urine is THC and that might not be in there anymore.\"-Alexi less irritable today as compared to yesterday-hoping lithium will be effective for mood stabilization without uncomfortable side effects-Alexi's speech less pressured-has been sarcastic and antagonistic towards specific staff and cooperative with others-states he likes to give people a hard time-  "

## 2019-09-19 NOTE — PROGRESS NOTES
"Pt arrived late and was agitated about medications.  He spoke a lot about his father and wanted to hear music that expressed his \"heartache.\"  Pt was persistently tense toward some group members as well as this therapist.  At one point, pt stopped all movement and verbal expression, and glared when he did not receive his 2nd song request.  Pt then began to argue, then abruptly stopped and left the group.       09/18/19 9855   Dance Movement Therapy   Type of Intervention structured groups   Response participates with cues/redirection   Hours 0.5     "

## 2019-09-19 NOTE — PROGRESS NOTES
"Patient refused scheduled morning dose of olanzapine. \"I took it yesterday and it made me have a horrible day--I'm not taking that Zyprexa, even if they court order it they can put me in snf I'm not taking it\". He then stated \"and that Seroquel messed up my circadian rhythms and I couldn't sleep last night!\" Sleep hours documented overnight as 3.75.     Patient speech remains pressured, intense and rapid with grandiose content. He is less irritable and tense today. Did require redirection for inappropriate topics of conversation (I.e. Drug talk, profanity, people being killed) but appeared less focused on insulting peers. Stated that he plans to try lithium tonight as a desirable alternative to olanzapine for medication.   "
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
## 2019-09-19 NOTE — PROGRESS NOTES
"Patient was in milieu most of the shift.  Patient was in general pleasant but appeared easily irritated when discussing medications and mental health concerns.  Patient denies having mental health issues.  \"I am fine.  I know what I need and I don't need any medications.\".  Patient appeared more calm when talking with treatment team.  Patient continues with pressured speech and is grandiose.       09/19/19 1400   Behavioral Health   Hallucinations denies / not responding to hallucinations   Thinking distractable;poor concentration   Orientation person: oriented;place: oriented   Memory baseline memory   Insight poor   Judgement impaired   Eye Contact at examiner   Affect full range affect   Mood grandiose;labile   Physical Appearance/Attire attire appropriate to age and situation   Hygiene well groomed   Suicidality other (see comments)  (denies)   1. Wish to be Dead (Past Month) No   2. Non-Specific Active Suicidal Thoughts (Past Month) No   Self Injury other (see comment)  (denies)   Elopement Statements about wanting to leave   Activity restless   Speech coherent;pressured   Medication Sensitivity no stated side effects   Psychomotor / Gait balanced;steady   Psycho Education   Type of Intervention 1:1 intervention   Response participates, initiates socially appropriate   Hours 0.5   Treatment Detail check-in     "

## 2019-09-20 PROCEDURE — 12400001 ZZH R&B MH UMMC

## 2019-09-20 PROCEDURE — 25000132 ZZH RX MED GY IP 250 OP 250 PS 637: Performed by: PSYCHIATRY & NEUROLOGY

## 2019-09-20 PROCEDURE — 99232 SBSQ HOSP IP/OBS MODERATE 35: CPT | Mod: GC | Performed by: PSYCHIATRY & NEUROLOGY

## 2019-09-20 PROCEDURE — 25000132 ZZH RX MED GY IP 250 OP 250 PS 637

## 2019-09-20 PROCEDURE — 25000132 ZZH RX MED GY IP 250 OP 250 PS 637: Performed by: STUDENT IN AN ORGANIZED HEALTH CARE EDUCATION/TRAINING PROGRAM

## 2019-09-20 RX ORDER — LITHIUM CARBONATE 300 MG/1
300 TABLET, FILM COATED, EXTENDED RELEASE ORAL EVERY MORNING
Status: DISCONTINUED | OUTPATIENT
Start: 2019-09-20 | End: 2019-09-24 | Stop reason: HOSPADM

## 2019-09-20 RX ADMIN — LITHIUM CARBONATE 300 MG: 300 TABLET, EXTENDED RELEASE ORAL at 11:55

## 2019-09-20 RX ADMIN — NICOTINE POLACRILEX 2 MG: 2 GUM, CHEWING BUCCAL at 11:55

## 2019-09-20 RX ADMIN — LITHIUM CARBONATE 600 MG: 300 TABLET, EXTENDED RELEASE ORAL at 19:24

## 2019-09-20 RX ADMIN — NICOTINE POLACRILEX 2 MG: 2 GUM, CHEWING BUCCAL at 08:07

## 2019-09-20 RX ADMIN — NICOTINE POLACRILEX 2 MG: 2 GUM, CHEWING BUCCAL at 17:12

## 2019-09-20 ASSESSMENT — ACTIVITIES OF DAILY LIVING (ADL)
ORAL_HYGIENE: INDEPENDENT
HYGIENE/GROOMING: INDEPENDENT
DRESS: INDEPENDENT;SCRUBS (BEHAVIORAL HEALTH)
HYGIENE/GROOMING: INDEPENDENT
DRESS: SCRUBS (BEHAVIORAL HEALTH);INDEPENDENT
LAUNDRY: WITH SUPERVISION
ORAL_HYGIENE: INDEPENDENT
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
## 2019-09-20 NOTE — PROGRESS NOTES
Alexi returned from court around 1715 and was cooperative with personal search. He was calm and rested in bed until dinner time.

## 2019-09-20 NOTE — PROGRESS NOTES
Pt presented immediately irritable in am. Irritable when demands not immediately met.  Freq complaining. Intrusive and interrupting others frequently. Angry when others don't stop to listen to him. Attention seeking. Freq grandiose through out the day.. Left for court about 1330. Ate meals.       09/20/19 1100   Behavioral Health   Hallucinations denies / not responding to hallucinations   Thinking distractable;delusional;paranoid   Orientation person: oriented;place: oriented;date: oriented;time: oriented   Memory baseline memory   Insight poor   Judgement impaired   Eye Contact at examiner   Affect tense;irritable;angry   Mood grandiose;labile;irritable   Physical Appearance/Attire attire appropriate to age and situation   Hygiene well groomed   1. Wish to be Dead (Past Month) No   2. Non-Specific Active Suicidal Thoughts (Past Month) No   Elopement Statements about wanting to leave;Distress about legal situation (holds for mental health or criminal)   Activity hyperactive (agitated, impulsive)   Speech pressured;tangential;rambling   Psychomotor / Gait balanced;steady   Psycho Education   Type of Intervention structured groups   Response refuses   Activities of Daily Living   Hygiene/Grooming independent   Oral Hygiene independent   Dress independent;scrubs (behavioral health)   Laundry with supervision   Room Organization independent

## 2019-09-20 NOTE — PROGRESS NOTES
09/19/19 5535   Group Therapy Session   Group Attendance attended group session   Total Time (minutes) 45   Group Type psychotherapeutic   Group Topic Covered other (see comments)   Patient Participation/Contribution cooperative with task;discussed personal experience with topic;listened actively   Patient participated in psychotherapy group which included a mindfulness activity focusing on the 5 senses and then processing as a group.    Davian presented in a positive mood. He was hyperverbal, speaking over others, and needed some redirection reminders but was receptive and accepting. Initially he struggled to think of his favorite sights, sounds, etc, but once he started processing ideas with others, his list grew.

## 2019-09-21 PROCEDURE — 12400001 ZZH R&B MH UMMC

## 2019-09-21 PROCEDURE — 25000132 ZZH RX MED GY IP 250 OP 250 PS 637

## 2019-09-21 PROCEDURE — 25000132 ZZH RX MED GY IP 250 OP 250 PS 637: Performed by: PSYCHIATRY & NEUROLOGY

## 2019-09-21 PROCEDURE — 25000132 ZZH RX MED GY IP 250 OP 250 PS 637: Performed by: STUDENT IN AN ORGANIZED HEALTH CARE EDUCATION/TRAINING PROGRAM

## 2019-09-21 RX ADMIN — NICOTINE POLACRILEX 4 MG: 2 GUM, CHEWING BUCCAL at 08:17

## 2019-09-21 RX ADMIN — NICOTINE POLACRILEX 4 MG: 2 GUM, CHEWING BUCCAL at 17:31

## 2019-09-21 RX ADMIN — LITHIUM CARBONATE 600 MG: 300 TABLET, EXTENDED RELEASE ORAL at 20:42

## 2019-09-21 RX ADMIN — LITHIUM CARBONATE 300 MG: 300 TABLET, EXTENDED RELEASE ORAL at 08:17

## 2019-09-21 ASSESSMENT — ACTIVITIES OF DAILY LIVING (ADL)
ORAL_HYGIENE: INDEPENDENT
ORAL_HYGIENE: INDEPENDENT
DRESS: INDEPENDENT
LAUNDRY: WITH SUPERVISION
HYGIENE/GROOMING: INDEPENDENT
LAUNDRY: WITH SUPERVISION
HYGIENE/GROOMING: INDEPENDENT
DRESS: INDEPENDENT

## 2019-09-21 NOTE — PLAN OF CARE
"Natalio started the day out asking for meds on time and when meds delivered during his breakfast he was upset that the timing was not before breakfast.  \"Now I asked for this before.  How can I get what is needed around here.\"  He was even irritable with nicotine gum, which he was pleased to get after he thought about it but initially expressing disgust at the incompetence.  He then went to group and was, per Hunter RIVERO, irritable with direction in group when he became tangential and intrusively leading the conversation away from others.  Then Natailo was upset with a blanket that he had just been given upon his request, \"It is not like the blanket I had yesterday.  You can see that it is getting lint all over my (scrubs).\".  He was given a different blanket and he thanked the staff twice for it. Around 1300 Natalio miky to the desk with raised voice asking for what sounded like \"kid flesh\".  He kept saying this and getting increasingly agitated that writer did not know what the request was.  \"I feel disrespected.  If I speak reeeaallly slowwwly maaaaaybe yyyyyyouuuu wwiiillll understaaaaaand.\".  He again said how he was very angry to be so disrespected and that this was done to him on purpose.  The more he tried to explain what he wanted the angrier he got.  Other staff at the desk understood him to mean he was asking for a super hero child picture.  \"\"I want to COLOR.  YOU DON'T UNDERSTAND COLOR????\".  Now yelling, eye intense stare, leaning over desk, face contorted with anger. Continued to say he was being disrespected.  He denied mocking staff, \"I just tried to say it slow enough so that you would understand.  What language do you speak here.?\".  Angry, mocking, inflexible, misinterpreting the intentions and the facts.  Impulsive, looking as if he would go to violence physically but he did not, he walked away \"F---you.\".  He was asked to be respectful.  He did walk away to his room. This anger appeared very " suddenly - escalating after a need to repeat his question.

## 2019-09-21 NOTE — PROGRESS NOTES
0800 Agitated and angry when needs not met immediately.    1000 Freq interrupting and tangential talking over peers in group, irritable when redirected.      1100  Rapidly agitated and angry. Angry outbursts after being redirected to not talk about fighting, alcohol or drugs. Yelling with profanity.     1345 Need not met immediately, verbally abusive with profanity.     1400 Rambling profduke in his room.

## 2019-09-21 NOTE — PROGRESS NOTES
Pt was present and socially active in milieu throughout the shift. Pt was observed socializing with peers (sometimes loudly) in milieu, talking on the phone, watching TV, and eating dinner. Pt presented with a fluctuating affect (tense, to irritable, to full range) but overall seems to be a bit more calm and stable than in previous shifts. However, pt still shows some instances of clear irritability and impulsivity. Pt stated that he feels his newest medication is helping but still shows poor understanding of mental illness. Pt denied experiencing any mental health symptoms and no clear indication of SI, HI, or SIB was observed.        09/20/19 2221   Behavioral Health   Hallucinations denies / not responding to hallucinations   Thinking distractable;poor concentration   Orientation person: oriented;place: oriented;date: oriented   Memory   (unsubstantiated)   Insight poor   Judgement impaired   Eye Contact at examiner   Affect tense;full range affect;irritable   Mood elated;labile;irritable   Physical Appearance/Attire attire appropriate to age and situation   Hygiene   (adequate)   Suicidality   (pt did not report thoughts or urges)   Self Injury   (pt did not report thoughts or urges)   Elopement   (no apparent risk)   Activity restless   Speech rambling;tangential;clear;coherent   Medication Sensitivity no stated side effects;no observed side effects   Psychomotor / Gait balanced;steady   Psycho Education   Type of Intervention 1:1 intervention   Response participates with encouragement   Hours 0.5   Treatment Detail   (check-in)   Activities of Daily Living   Hygiene/Grooming independent   Oral Hygiene independent   Dress scrubs (behavioral health);independent   Laundry   (pt did not do laundry)   Room Organization independent   Activity   Activity Assistance Provided independent

## 2019-09-22 PROCEDURE — 12400001 ZZH R&B MH UMMC

## 2019-09-22 PROCEDURE — 25000132 ZZH RX MED GY IP 250 OP 250 PS 637: Performed by: STUDENT IN AN ORGANIZED HEALTH CARE EDUCATION/TRAINING PROGRAM

## 2019-09-22 PROCEDURE — 25000132 ZZH RX MED GY IP 250 OP 250 PS 637: Performed by: PSYCHIATRY & NEUROLOGY

## 2019-09-22 PROCEDURE — 25000132 ZZH RX MED GY IP 250 OP 250 PS 637

## 2019-09-22 PROCEDURE — 90853 GROUP PSYCHOTHERAPY: CPT

## 2019-09-22 RX ADMIN — LITHIUM CARBONATE 300 MG: 300 TABLET, EXTENDED RELEASE ORAL at 08:21

## 2019-09-22 RX ADMIN — NICOTINE POLACRILEX 4 MG: 2 GUM, CHEWING BUCCAL at 08:22

## 2019-09-22 RX ADMIN — NICOTINE POLACRILEX 4 MG: 2 GUM, CHEWING BUCCAL at 17:24

## 2019-09-22 RX ADMIN — LITHIUM CARBONATE 600 MG: 300 TABLET, EXTENDED RELEASE ORAL at 19:41

## 2019-09-22 ASSESSMENT — ACTIVITIES OF DAILY LIVING (ADL)
DRESS: INDEPENDENT
LAUNDRY: WITH SUPERVISION
HYGIENE/GROOMING: INDEPENDENT
ORAL_HYGIENE: INDEPENDENT
DRESS: SCRUBS (BEHAVIORAL HEALTH)
ORAL_HYGIENE: INDEPENDENT
LAUNDRY: WITH SUPERVISION
HYGIENE/GROOMING: INDEPENDENT

## 2019-09-22 ASSESSMENT — MIFFLIN-ST. JEOR: SCORE: 1616.35

## 2019-09-22 NOTE — PROGRESS NOTES
09/22/19 9840   Group Therapy Session   Group Attendance attended group session   Total Time (minutes) 45   Group Type psychotherapeutic   Group Topic Covered other (see comments)   Patient Participation/Contribution cooperative with task;discussed personal experience with topic;listened actively   Psychotherapy group goals: Identify and share responses to 4 questions (fears, loves/likes, things to let go, wants). Davian Das) presented as frustrated. He reported he is anxiously awaiting for a report from the court. He actively engaged in the activity and group processing. Demonstrated positive social interactions.

## 2019-09-22 NOTE — PLAN OF CARE
"Natalio approached writer around 0830 this am and said, \"I just wanted you to know that I know that I was rude yesterday and that I am sorry.\".  Natalio had direct and soft eye contact, he was congruent in word and action about apology and his voice was soft and gentle.  He seemed 100% sincere and showed insight into the reality of mood cycles.  This was an example of how to successfully resolve a conflict.  He took responsibility for a short lived reaction, did not blame anyone else, humbly did this in front peers and staff, and as soon as he could after the event.  "

## 2019-09-22 NOTE — PROGRESS NOTES
Pt spent most of his time in the lounge with others. He was observed watching TV with others and he becomes irritable couple time when another peer took his oil and a blanket. He denies suicidal ideation or hearing voices. He had both meals with no problem or having behavior issues.     09/22/19 1432   Behavioral Health   Hallucinations denies / not responding to hallucinations   Thinking distractable   Orientation time: oriented;date: oriented;place: oriented;person: oriented   Memory baseline memory   Insight admits / accepts   Judgement impaired   Eye Contact at examiner   Affect irritable   Mood mood is calm   Physical Appearance/Attire appears stated age   Hygiene well groomed   Suicidality other (see comments)  (Denies)   1. Wish to be Dead (Past Month) No   2. Non-Specific Active Suicidal Thoughts (Past Month) No   Self Injury other (see comment)  (Denies)   Activity other (see comment)  (Social with others)   Psychomotor / Gait balanced;steady   Psycho Education   Type of Intervention 1:1 intervention   Response participates, initiates socially appropriate   Hours 0.5   Activities of Daily Living   Hygiene/Grooming independent   Oral Hygiene independent   Dress scrubs (behavioral health)   Laundry with supervision   Room Organization independent   Activity   Activity Assistance Provided independent

## 2019-09-23 PROCEDURE — 25000132 ZZH RX MED GY IP 250 OP 250 PS 637: Performed by: PSYCHIATRY & NEUROLOGY

## 2019-09-23 PROCEDURE — G0177 OPPS/PHP; TRAIN & EDUC SERV: HCPCS

## 2019-09-23 PROCEDURE — H2032 ACTIVITY THERAPY, PER 15 MIN: HCPCS

## 2019-09-23 PROCEDURE — 12400001 ZZH R&B MH UMMC

## 2019-09-23 PROCEDURE — 25000132 ZZH RX MED GY IP 250 OP 250 PS 637

## 2019-09-23 PROCEDURE — 99232 SBSQ HOSP IP/OBS MODERATE 35: CPT | Mod: GC | Performed by: PSYCHIATRY & NEUROLOGY

## 2019-09-23 PROCEDURE — 25000132 ZZH RX MED GY IP 250 OP 250 PS 637: Performed by: STUDENT IN AN ORGANIZED HEALTH CARE EDUCATION/TRAINING PROGRAM

## 2019-09-23 RX ADMIN — NICOTINE POLACRILEX 2 MG: 2 GUM, CHEWING BUCCAL at 21:20

## 2019-09-23 RX ADMIN — NICOTINE POLACRILEX 4 MG: 2 GUM, CHEWING BUCCAL at 08:20

## 2019-09-23 RX ADMIN — LITHIUM CARBONATE 300 MG: 300 TABLET, EXTENDED RELEASE ORAL at 08:20

## 2019-09-23 RX ADMIN — LITHIUM CARBONATE 600 MG: 300 TABLET, EXTENDED RELEASE ORAL at 21:18

## 2019-09-23 RX ADMIN — NICOTINE POLACRILEX 2 MG: 2 GUM, CHEWING BUCCAL at 18:58

## 2019-09-23 ASSESSMENT — ACTIVITIES OF DAILY LIVING (ADL)
HYGIENE/GROOMING: INDEPENDENT
ORAL_HYGIENE: INDEPENDENT
LAUNDRY: WITH SUPERVISION
DRESS: SCRUBS (BEHAVIORAL HEALTH)

## 2019-09-23 NOTE — PROGRESS NOTES
"Update:    I spoke with Alexi's father, Robert Rodas, on 9/23/19 via phone to discuss how he feels Alexi is doing and what his thoughts are upon Alexi's discharge. He feels that Alexi is doing \"pretty well\" and is back to his baseline. When asked if it would be an option for Alexi to discharge to home with him he responded with \"Absolutely!\" and explained that Alexi initially left his home of his own accord. He said that Alexi has stopped taking medications in the past because of \"the shakes.\" Finally, he says that they do have an extra car at home so if Alexi needed to attend any programs or therapy throughout the day that would be possible.    Daiana Cazares, MS3          I was present with the medical student who participated in the service and in the documentation of the note.     Johnny Ochoa DO, MPH  PGY-1 Psychiatry Resident    "

## 2019-09-23 NOTE — PROGRESS NOTES
Participated in Music Therapy group with focus on mood elevation, validation and decreasing anxiety and improved group cohesiveness. Engaged and cooperative in music listening interventions.   Showed progress in session goals.     Tended to argue with peers at times and be contrary, but was able to resolve things each time through communicating.

## 2019-09-23 NOTE — PROGRESS NOTES
Pt observed in groups and watching TV with others. He denies suicidal ideations or hallucinations. No irritability this shift except complaining of not getting to check his phone. He has been cooperative and pleasant on approach. No behavioral issues or medications issues.     09/23/19 1324   Behavioral Health   Hallucinations denies / not responding to hallucinations   Thinking distractable   Orientation time: oriented;date: oriented;place: oriented;person: oriented   Memory baseline memory   Insight poor   Judgement impaired   Eye Contact at examiner   Affect full range affect   Mood mood is calm   Physical Appearance/Attire appears stated age   Hygiene well groomed   Suicidality other (see comments)  (Denies)   1. Wish to be Dead (Past Month) No   2. Non-Specific Active Suicidal Thoughts (Past Month) No   Self Injury other (see comment)  (Denies)   Activity other (see comment)  (Social with others)   Speech clear;coherent   Medication Sensitivity no stated side effects   Psychomotor / Gait balanced;steady   Psycho Education   Type of Intervention 1:1 intervention   Response participates, initiates socially appropriate   Hours 0.5

## 2019-09-23 NOTE — PROVIDER NOTIFICATION
Alexi had a good shift and calmed himself after some anger on Day shift about not being given his phone. He was lying down at start of shift in order to calm himself. Afterwards, he came out for dinner, was social and appropriate with peers and staff. He acknowledges his anger issues and knows some coping skills.       09/22/19 2200   Behavioral Health   Thoughts/Cognition (WDL) ex   Hallucinations denies / not responding to hallucinations   Thinking distractable;intact   Orientation person: oriented;place: oriented;date: oriented;time: oriented   Memory baseline memory   Insight admits / accepts   Judgement impaired   Eye Contact at examiner   Affect/Mood (WDL) ex   Affect full range affect   Mood mood is calm   ADL Assessment (WDL) WDL   Physical Appearance/Attire neat;attire appropriate to age and situation   Hygiene well groomed   Suicidality (WDL) WDL   Suicidality other (see comments)  (denies)   1. Wish to be Dead (Past Month) No   Wish to be Dead Description (Recent) no   2. Non-Specific Active Suicidal Thoughts (Past Month) No   Non-Specific Active Suicidal Thought Description (Recent) no   Elopement (WDL) WDL   Activity (WDL) WDL   Speech (WDL) WDL   Speech clear;coherent   Medication Sensitivity (WDL) WDL   Medication Sensitivity no stated side effects;no observed side effects   Psychomotor Gait (WDL) WDL   Psychomotor / Gait balanced;steady   Overt Agression (WDL) WDL  (not this shift)   Substance Withdrawal   Substance Withdrawal None   C-SSRS (Daily/Shift Screen)   Suicidal Thoughts (Since Last Asked) no   Suicide Behavior (Since Last Asked) no   Overt Aggression Scale   Verbal Aggression 0   Aggression against Property 0   Auto-Aggression 0   Physical Aggression 0   Overt Aggression Total Score 0   Activities of Daily Living   Hygiene/Grooming independent   Oral Hygiene independent   Dress independent   Laundry with supervision   Room Organization independent   Activity   Activity Assistance Provided  independent
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
## 2019-09-23 NOTE — PLAN OF CARE
"Hector  attended 3 of 3 OT groups today. Slightly less pressured; still quite tangential once he begins to converse. Expressed a strong desire to discharge as soon as possible. Feels that \"staying here is not helping;\" reported feeling stressed about keeping up with his expenses and other practical concerns. Stated that the meds he is on are \"mostly helping.\"     "

## 2019-09-24 ENCOUNTER — MEDICAL CORRESPONDENCE (OUTPATIENT)
Dept: HEALTH INFORMATION MANAGEMENT | Facility: CLINIC | Age: 24
End: 2019-09-24

## 2019-09-24 VITALS
SYSTOLIC BLOOD PRESSURE: 132 MMHG | WEIGHT: 143 LBS | HEART RATE: 84 BPM | DIASTOLIC BLOOD PRESSURE: 82 MMHG | OXYGEN SATURATION: 100 % | RESPIRATION RATE: 14 BRPM | TEMPERATURE: 97.8 F | BODY MASS INDEX: 21.67 KG/M2 | HEIGHT: 68 IN

## 2019-09-24 LAB — LITHIUM SERPL-SCNC: 0.63 MMOL/L (ref 0.6–1.2)

## 2019-09-24 PROCEDURE — 36415 COLL VENOUS BLD VENIPUNCTURE: CPT

## 2019-09-24 PROCEDURE — 25000132 ZZH RX MED GY IP 250 OP 250 PS 637: Performed by: STUDENT IN AN ORGANIZED HEALTH CARE EDUCATION/TRAINING PROGRAM

## 2019-09-24 PROCEDURE — 80178 ASSAY OF LITHIUM: CPT

## 2019-09-24 PROCEDURE — 99238 HOSP IP/OBS DSCHRG MGMT 30/<: CPT | Mod: GC | Performed by: PSYCHIATRY & NEUROLOGY

## 2019-09-24 PROCEDURE — G0177 OPPS/PHP; TRAIN & EDUC SERV: HCPCS

## 2019-09-24 PROCEDURE — 25000132 ZZH RX MED GY IP 250 OP 250 PS 637

## 2019-09-24 RX ORDER — LITHIUM CARBONATE 300 MG/1
300 TABLET, FILM COATED, EXTENDED RELEASE ORAL EVERY MORNING
Qty: 30 TABLET | Refills: 0 | Status: SHIPPED | OUTPATIENT
Start: 2019-09-25 | End: 2019-09-24

## 2019-09-24 RX ORDER — LITHIUM CARBONATE 300 MG/1
900 TABLET, FILM COATED, EXTENDED RELEASE ORAL SEE ADMIN INSTRUCTIONS
Qty: 90 TABLET | Refills: 0 | Status: ON HOLD | OUTPATIENT
Start: 2019-09-24 | End: 2019-10-25

## 2019-09-24 RX ORDER — LITHIUM CARBONATE 300 MG/1
600 TABLET, FILM COATED, EXTENDED RELEASE ORAL AT BEDTIME
Qty: 60 TABLET | Refills: 0 | Status: SHIPPED | OUTPATIENT
Start: 2019-09-24 | End: 2019-09-24

## 2019-09-24 RX ADMIN — LITHIUM CARBONATE 300 MG: 300 TABLET, EXTENDED RELEASE ORAL at 08:32

## 2019-09-24 RX ADMIN — NICOTINE POLACRILEX 2 MG: 2 GUM, CHEWING BUCCAL at 08:34

## 2019-09-24 ASSESSMENT — MIFFLIN-ST. JEOR: SCORE: 1628.14

## 2019-09-24 ASSESSMENT — ACTIVITIES OF DAILY LIVING (ADL)
HYGIENE/GROOMING: INDEPENDENT
DRESS: INDEPENDENT;SCRUBS (BEHAVIORAL HEALTH)
ORAL_HYGIENE: INDEPENDENT
LAUNDRY: WITH SUPERVISION
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
## 2019-09-24 NOTE — PROGRESS NOTES
Patient signed provisional discharge, writer [place] in [place] the county and placed copy in chart.

## 2019-09-24 NOTE — PLAN OF CARE
48 Hour Assessment:    Pt states he feels ready to discharge. Pt attending and participating in unit groups/activities.  Pt has a full range affect and has been appropriate and social with staff and peers. Pt also participated in conversations with nursing students.  Pt denies SI/Self harm thoughts, urges, plan, and intent.  Pt denies wanting to be dead.  Pt denies physical discomfort.  Pt denies medication AE.  Pt denies difficulty sleeping.  Pt denies AVH.  Pt eating and drinking without issue.  Will continue to assess and provide support as appropriate.      Pt discharged into the care of father. Discharge teaching, including a review of the follow-up care set up by [facility], as well as medication teaching, complete. Discussed support system- father, significant other, friends and coping skills- long-boarding, walking dog, taking a drive, watching tv. Pt denies SI/SIB/HI. All belongings were returned to pt and guardian upon discharge. Pt discharged with 30 day supply of medication.

## 2019-09-24 NOTE — PROGRESS NOTES
Pt was observed on phone this shift couple time talking with family/GF. He did have great conversations with them according to his report. He denies suicidal ideation or seeing things other people don't see. He appears to struggle with attachment issues and trust.      09/23/19 2201   Behavioral Health   Hallucinations denies / not responding to hallucinations   Thinking intact   Orientation time: oriented;date: oriented;place: oriented;person: oriented   Memory baseline memory   Insight insight appropriate to situation   Judgement impaired   Eye Contact at examiner   Affect full range affect   Mood mood is calm   Physical Appearance/Attire appears stated age   Hygiene well groomed   Suicidality other (see comments)  (Denies)   1. Wish to be Dead (Past Month) No   2. Non-Specific Active Suicidal Thoughts (Past Month) No   Self Injury other (see comment)  (Denies)   Activity other (see comment)  (Social with others)   Speech coherent;clear   Medication Sensitivity no stated side effects   Psychomotor / Gait balanced;steady   Psycho Education   Type of Intervention 1:1 intervention   Response participates, initiates socially appropriate   Hours 0.5   Activities of Daily Living   Hygiene/Grooming independent   Oral Hygiene independent   Dress scrubs (behavioral health)   Laundry with supervision   Room Organization independent   Activity   Activity Assistance Provided independent
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
## 2019-09-24 NOTE — PROGRESS NOTES
09/23/19 2200   General Information   Art Directive other (see comments)   AT directive is to create an image of self as a landscape using chosen art media. Goals of directive: to use metaphor to create a personal self narrative, identify personal strengths and goals. Pt was a positive participant, focused on task for the full duration of group. Pt jovan himself as a meteor shower and described each color in the meteors as representing various feelings. Feelings included anger, sadness, joyfulness. Pt feels that he experiences many emotions at once but also can have periods where he feels calm and centered in the moment.  Pts mood was calm.

## 2019-09-24 NOTE — DISCHARGE INSTRUCTIONS
Behavioral Discharge Planning and Instructions      Summary:  You were admitted on 9/9/2019  due to Schizoaffective disorder.  You were treated by Dr. Autumn Nunn MD and discharged on 9/24/19 from Station 22 to Home. Since being in the hospital you have completed the civil commitment process and have been placed under commitment until March 20th 2020. You have signed a Provisional Discharge outlining the conditions of your discharge and you will need to remain in compliance with this plan in order to remain in the community without the court intervening.     Principal Diagnosis: Schizoaffective Disorder       Health Care Follow-up Appointments:     Day Treatment Intake: Thursday September 25th 9am  [facility] Day Treatment Located in [facility]  Floor [address]; [address] [place] [address] Phone:[phone]    Date/Time: Thursday October 24th 8am    Provider: SHERIDAN Deng   Address:[facility] [address]  [place] [address]  Ph: [phone] Fax: [phone]  Complete paperwork before this appointment at [email or web address]  Or call the clinic and they will send you a link with the paperwork attached..     Attend all scheduled appointments with your outpatient providers. Call at least 24 hours in advance if you need to reschedule an appointment to ensure continued access to your outpatient providers.   Major Treatments, Procedures and Findings:  You were provided with: a psychiatric assessment, assessed for medical stability, medication evaluation and/or management and milieu management    Symptoms to Report: feeling more aggressive, increased confusion, mood getting worse or thoughts of suicide    Early warning signs can include: increased depression or anxiety sleep disturbances increased unusual thinking, such as paranoia or hearing voices    Safety and Wellness:  Take all  "medicines as directed.  Make no changes unless your doctor suggests them.      Follow treatment recommendations.  Refrain from alcohol and non-prescribed drugs.  If there is a concern for safety, call [phone].    Resources:   Crisis Intervention: 655.304.2853 or 947-126-4902 (TTY: 718.964.5570).  Call anytime for help.  [place] on Mental [place]ness (www.[place].rozina.org): 725.787.3972 or 990-117-3032.  Suicide Awareness Voices of Education (SAVE) (www.save.org): 415-255-XJDS (0380)  National Suicide Prevention Line (www.mentalhealthmn.org): 211-680-FSCY (1907)  Mental Health Consumer/Survivor Network of [place] (www.mhcsn.net): 888.914.9781 or 470-391-8121  Mental Health Association of [place] (www.mentalhealth.org): 453.850.8297 or 589-270-9297  Self- Management and Recovery Training., [place]-- Toll free: 443.164.3311  www.[employer].Try The World  [facility] Crisis (COPE) Response - Adult 031 726-1958  Text 4 Life: txt \"LIFE\" to 41468 for immediate support and crisis intervention  Crisis text line: Text \"[place]\" to 737776. Free, confidential, 24/7.  Crisis Intervention: 177.233.9627 or 197-719-9754. Call anytime for help.       The treatment team has appreciated the opportunity to work with you.     If you have any questions or concerns our unit number is [phone]        "

## 2019-09-26 ENCOUNTER — HOSPITAL ENCOUNTER (OUTPATIENT)
Dept: BEHAVIORAL HEALTH | Facility: CLINIC | Age: 24
Discharge: HOME | End: 2019-09-26
Attending: PSYCHIATRY & NEUROLOGY | Admitting: PSYCHIATRY & NEUROLOGY
Payer: COMMERCIAL

## 2019-09-26 ENCOUNTER — BEH TREATMENT PLAN (OUTPATIENT)
Dept: BEHAVIORAL HEALTH | Facility: CLINIC | Age: 24
End: 2019-09-26
Attending: PSYCHIATRY & NEUROLOGY

## 2019-09-26 DIAGNOSIS — F25.0 SCHIZOAFFECTIVE DISORDER, BIPOLAR TYPE (H): ICD-10-CM

## 2019-09-26 PROCEDURE — 90791 PSYCH DIAGNOSTIC EVALUATION: CPT | Performed by: COUNSELOR

## 2019-09-26 ASSESSMENT — PAIN SCALES - GENERAL: PAINLEVEL: NO PAIN (0)
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
## 2019-09-26 NOTE — PROGRESS NOTES
Acknowledgement of Current Treatment Plan       I have reviewed my Initial Individual Treatment Plan with my therapist / on 9/26/2019.   I agree with the plan as it is written in the electronic health record.                      Signature Below:  Natalio Rodas      Patient      Tashi Sandovalnoman [facility], [facility], [facility]    DA Psychotherapist    Admitting Provider: Admitting Provider Signature Below:   Dr Destin Huitron MD   Psychiatrist    Dr Erika Hancock MD  Psychiatrist    Dr Edgar Anderson MD  Psychiatrist    Dr Wes Galindo MD  Psychiatrist    Sayra Mitchell, CLAUDIO  Psychiatric Provider

## 2019-09-26 NOTE — PROGRESS NOTES
Initial Individual Treatment Plan     Patient: Natalio Rodas   MRN: [id number]  : 1995  Age: 23 year old  Sex: male    Diagnostic Assessment Date / Date of Initial Individual Treatment Plan: 19      Immediate Health Concerns:  No     Immediate Safety Concerns:  No    Identify the issues to be addressed in treatment:  Symptom Management, Develop Socialization / Interpersonal Relationship Skills and Other: Coping skills for mental health symptoms.    Client Initial Individualized Goals for Treatment:  Management of mental health symptoms; Emotion regulation; Coping skills; Increased insight    Initial Treatment suggestions for the client during the time between Diagnostic Assessment and completion of the Individualized Treatment Plan:  Ask for more information, support and/or assistance as needed.  Follow up with providers/community supports as needed:  [facility]  Report increases or changes in symptoms to staff.  Report any personal safety concerns to staff.   Take medications as prescribed.  Report medication changes and/or side effects to staff.  Attend and participate in groups as scheduled or notify staff if unable to do so.  Report any use of substances to staff as this may impact your symptoms and/or personal safety.  Notify staff if you have any other issues that need to be addressed. This may include any current abuse / neglect / exploitation or other vulnerability.  Follow recommendations of your treatment team and discuss concerns if not in agreement.     Treatment Team Responsible: Day Treatment (DT)      Therapeutic Interventions/Treatment Strategies may include:  Support, Redirection, Feedback, Limit/Boundaries, Safety Assessments, Structured Activity, Problem Solving, Clarification, Education, Motivational Enhancement and Relapse Prevention as needed.    Tashi Olivas, [facility]

## 2019-09-30 ENCOUNTER — TELEPHONE (OUTPATIENT)
Dept: BEHAVIORAL HEALTH | Facility: CLINIC | Age: 24
End: 2019-09-30

## 2019-09-30 NOTE — TELEPHONE ENCOUNTER
Writer attempted to call Pt today since he did not start as planned. The number listed as home and cell is his father's phone.  Father gave the Work number listed to reach Pt.  He did not answer.  A generic message was left with Pt to call back.

## 2019-10-01 ENCOUNTER — TELEPHONE (OUTPATIENT)
Dept: BEHAVIORAL HEALTH | Facility: CLINIC | Age: 24
End: 2019-10-01

## 2019-10-03 ENCOUNTER — TELEPHONE (OUTPATIENT)
Dept: PHARMACY | Facility: CLINIC | Age: 24
End: 2019-10-03

## 2019-10-03 ENCOUNTER — HOSPITAL ENCOUNTER (OUTPATIENT)
Dept: BEHAVIORAL HEALTH | Facility: CLINIC | Age: 24
End: 2019-10-03
Attending: PSYCHIATRY & NEUROLOGY | Admitting: PSYCHIATRY & NEUROLOGY
Payer: COMMERCIAL

## 2019-10-03 DIAGNOSIS — F29 PSYCHOSIS (H): ICD-10-CM

## 2019-10-03 PROCEDURE — G0177 OPPS/PHP; TRAIN & EDUC SERV: HCPCS

## 2019-10-03 PROCEDURE — 90853 GROUP PSYCHOTHERAPY: CPT

## 2019-10-03 ASSESSMENT — ANXIETY QUESTIONNAIRES
1. FEELING NERVOUS, ANXIOUS, OR ON EDGE: SEVERAL DAYS
6. BECOMING EASILY ANNOYED OR IRRITABLE: MORE THAN HALF THE DAYS
5. BEING SO RESTLESS THAT IT IS HARD TO SIT STILL: NOT AT ALL
IF YOU CHECKED OFF ANY PROBLEMS ON THIS QUESTIONNAIRE, HOW DIFFICULT HAVE THESE PROBLEMS MADE IT FOR YOU TO DO YOUR WORK, TAKE CARE OF THINGS AT HOME, OR GET ALONG WITH OTHER PEOPLE: NOT DIFFICULT AT ALL
2. NOT BEING ABLE TO STOP OR CONTROL WORRYING: NOT AT ALL
GAD7 TOTAL SCORE: 4
7. FEELING AFRAID AS IF SOMETHING AWFUL MIGHT HAPPEN: SEVERAL DAYS
3. WORRYING TOO MUCH ABOUT DIFFERENT THINGS: NOT AT ALL

## 2019-10-03 ASSESSMENT — PATIENT HEALTH QUESTIONNAIRE - PHQ9
SUM OF ALL RESPONSES TO PHQ QUESTIONS 1-9: 1
5. POOR APPETITE OR OVEREATING: NOT AT ALL

## 2019-10-03 NOTE — PROGRESS NOTES
Acknowledgement of Current Treatment Plan       I have reviewed my treatment plan with my therapist / counselor on 10/10/19.   I agree with the plan as it is written in the electronic health record. (2A)    Name:      Signature:  Natalio Galindo MD  Psychiatrist    Sayra Mitchell, NP    Toshia Richard PsyD, LP  Psychotherapist

## 2019-10-03 NOTE — PROGRESS NOTES
Adult Day Treatment Program:  Individualized Treatment Plan     Date of Plan: 10/10/10    Name: Natalio Rodas MRN: [id number]    : 1995    Programs:  Day Treatment (DT)     Clinical Track (if applicable):  2A    DSM5 Diagnosis  295.70  (F25) Schizoaffective Disorder Bipolar Type  300.02 (F41.1) Generalized Anxiety Disorder    Adult Day Treatment Program Multidisciplinary Team Members: Wes Galindo MD and/or Sayra Mitchell NP;    Toshia Richard PsyD, LATISHA, Noble Kauffman, OTR/L, Delvin Yuen MOTR/L, Mary Ellen Roy, CARLIN    Nataloi Rodas will participate in the Adult Day Treatment Program  3 days per week, 3 hours per day. Anticipated duration/discharge: 12 weeks    Program Start Date: 10/3/19  Anticipated Discharge Date: 19 (pending authorization/clinical changes)    NOTE: Complete CGI     Review Date: Does Natalio Rodas continue to meet criteria to participate in the Adult Day Treatment Program, 3 days per week; 3 hours per day?   10/10/19 yes                       Client Strengths:  caring, creative, goal-focused, has a previous history of therapy, insightful, intelligent, open to learning, support of family, friends and providers, willing to ask questions, willing to relate to others and work history.     Client Participation in Plan:  Contributed to goals and plan     Areas of Vulnerability:  Anxiety with/without panic attacks  Depressive symptoms  Other: Substance use, delusions, hallucinations    Long-Term Goals:  Knowledge about illness and management of symptoms   Maintenance of personal safety     Abuse Prevention Plan:  Safe, therapeutic environment   Safety coping plan as needed   Education regarding illness and skill development   Coordination with care providers     Discharge Criteria:  Satisfactory progress toward treatment goals   Improvement re: identified problems and symptoms   Ability to continue recovery at next level of service   Has a discharge plan in place   Has  safety/coping plan in place      Areas of Treatment Focus        Area of Treatment Focus:   Personal Safety  Start Date:    10/10/19    Goal: Target Date: 12/5/19 Status: stopped 10/24/2019    Natalio will notify staff when needing assistance to develop or implement a coping plan to manage suicidal or self injurious urges.  Natalio will use coping plan for safety, as needed.        Progress:   10/8/19  He reported being safe during group therapy.        Treatment Strategies:   Assess / reassess level of potential for harm to self or others  Engage in safety planning when indicated  Facilitate increased self awareness          Area of Treatment Focus:   Symptom Stabilization and Management  Start Date:    10/10/19    Goal: Target Date: 12/5/19 Status: stopped 10/24/2019  When in life skills group Natalio will report progress to maintain energy and purpose in his daily activities  at least 75% of the time providing an update weekly.      Progress:           Treatment Strategies:   Facilitate increased self awareness  Provide education regarding strategies to improve energy and purpose in farfan life activities          Area of Treatment Focus:  Group Therapy    Start Date:   10/10/19    Goal: Target Date: 12/5/19 Status: stopped 10/24/2019      Client will learn and practice 1-2 skills to manage symptoms of depression and anxiety,   Practice mindfulness 3-4 times weekly, at home, and with the group.     Progress:           Treatment Strategies:   Assist clients in establishing / strengthening support network  Engage in safety planning when indicated  Teach adaptive coping skills and communication skills           Area of Treatment Focus:   Symptom Stabilization and Management  Start Date:    10/10/19    Goal: Target Date: 12/5/19 Status: stopped 10/24/2019  In group Natalio will learn, practice, generalize 2 sensory modulation or sensorimotor mindfulness based self regulation skills for improved ability to stay in  "the present moment and distress tolerance.        Progress:           Treatment Strategies:   Facilitate increased self awareness  Provide education regarding sensory modulation or mindfulness based self regulation skills          Area of Treatment Focus:   Wellness and Mental Health  Start Date:    10/10/19    Goal: Target Date: 12/5/19 Status:stopped 10/24/2019  Natalio will improve wellness related behaviors by getting enough sleep,exercise, balanced nutrition and take medications (if prescribed) to maintain good mental health.        Progress:   10/7/2019  He reported that he gets good sleep, takes his medications, does long-boarding for exercise, and eats healthy foods.        Treatment Strategies:   Facilitate increased self awareness  Provide education regarding wellness habits and routines which can improve mental health          Area of Treatment Focus:   Community Resources / Support and Discharge Planning  Start Date:    10/10/19    Goal: Target Date: 12/5/19 Status: stopped 10/24/2019  Natalio will establish an aftercare plan to include medical providers and social supports by discharge.    Progress:   10/7/2019  He reported that he wants to return to work.        Treatment Strategies:   Facilitate increased self awareness  Provide education regarding relapse prevention,discharge planning and social support     ELIANA Maurer/L  4C Treatment Team - Adult Day Treatment Program    ABBIE Flores,  [phone]    Everett Estevez., Licensed Psychologist,  [phone]    Allegra Cunningham MSN, RN, PHN  [phone]    NOTE: Required signatures are completed manually and scanned into the electronic medical record. See \"Media\" tab in epic.      "

## 2019-10-03 NOTE — PROGRESS NOTES
Adult Mental Health Day Treatment    PATIENT'S NAME: Natalio Rodas  MRN:   [id number]  :   1995  ACCT. NUMBER: [id number]  DATE OF SERVICE: 10/03/19  START TIME: 1100  END TIME: 1150    NUMBER OF PARTICIPANTS: 8      Summary of Group / Topics Discussed:  Resiliency Development: Self Esteem and Self Compassion: Perception: Patients were given the opportunity to reflect and identified the positive aspects of their life.  Patients were taught about the importance of self kindness on mental health wellbeing.  Patients were also given the opportunity to improve self efficacy, self sufficiency, quality of life and a sense of mastery and competency by identifying positive aspects of their life and to instill hope.      Patient Session Goals / Objectives:    Identified personal strengths and qualities about themselves as a way to provide hope and build self-compassion as a way to effectively manage both mental health and substance abuse/abuse symptoms     Improved awareness of positive qualities and how this contribute to the process of recovery and mental health wellbeing and resiliency      Established a plan for practice of these skills in their own environments    Practiced and reflected on how to generalize taught skills to their everyday life    Patient Participation / Response:  Moderately participated, sharing some personal reflections / insights and adequately adequately received / provided feedback with other participants.    Patient presentation: Calm,alert with fair focus and concentration. Mood and thought process stable. Social with other group members. He expressed awareness of his current psychiatric dignosisis. He scored 25/30 on a mental health recovery. The high score is reflective of limited personal insight and understanding related to his current mental health problems, Verbalized understanding of content and Patient would benefit from additional opportunities to practice the content to  be able to generalize it to their everyday life with increased intentionality, consistency, and efficacy in support of their psychiatric recovery    Treatment Plan:  Patient has an initial individualized treatment plan that was created as part of their diagnostic assessment / admission process.  A master individualized treatment plan is in the process of being developed with the patient and multi-disciplinary care team.
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
## 2019-10-03 NOTE — ADDENDUM NOTE
Encounter addended by: Delvin Yuen, OT on: 10/3/2019 6:01 PM   Actions taken: Flowsheet accepted, Charge Capture section accepted

## 2019-10-03 NOTE — ADDENDUM NOTE
Encounter addended by: Gee Kauffman, OTR/L on: 10/3/2019 1:02 PM   Actions taken: Flowsheet accepted, Clinical Note Signed, Charge Capture section accepted

## 2019-10-03 NOTE — ADDENDUM NOTE
Encounter addended by: Toshia Richard PsyD on: 10/3/2019 5:30 PM   Actions taken: Order Reconciliation Section accessed

## 2019-10-03 NOTE — DISCHARGE SUMMARY
Adult Mental Health Intensive Outpatient Discharge Summary/Instructions      Patient: Natalio Rodas MRN: [id number]   : 1995 Age: 23 year old Sex: male     Admission Date: 10/3/19  Discharge Date: 10/24/19  Diagnosis: 295.70  (F25) Schizoaffective Disorder Bipolar Type  300.02 (F41.1) Generalized Anxiety Disorder    Focus of Treatment / Progress    Personal Safety: he reported being safe, the last time he was in the program, which was on 10/8/19.     * Follow your safety plan     * Call crisis lines as needed:    [facility] [phone] [facility] [phone]  [facility] [phone] Crisis Connection [phone]  [facility] [phone] [facility]E [phone]  [facility] [phone] National Suicide Prevention [phone]  [facility] [phone] Suicide Prevention [phone]  [facility] [phone]    Managing symptoms of:  Psychosis: hearing voices, intrusive, unwanted thoughts, ruminating thoughts,  difficulty concentrating, urges to break or harm things, low energy, flashbacks and nightmares of past trauma,  depression, suicidal thoughts, hopelessness, anxiety, worry about legal issues.    Community support/health:  [place] [address] ([phone]) [email or web address], [place] Crisis text line( Text [place] to 328550),  Tanika Bunn [facility]  [place], [place] ([phone])  Micheline Quinonez [facility] [place], [place] ( [phone])       Managing Symptoms and Preventing Relapse    * Go to all of your appointments    * Take all medications as directed.      * Carry a current list if medication with you    * Do not use illicit (street) drugs.  Avoid alcohol    * Report these symptoms to your care team. These are early signs of relapse:   Thoughts of suicide   Losing more sleep   Increased confusion   Mood getting worse   Feeling more aggressive   Other:  Keep appointments, maintain sobriety    *Use these skills daily: Talk to  "someone you trust at least one time weekly, set boundaries and say \"no\", be assertive, act opposite of negative feelings, accept challenges with a positive attitude, exercise at least three times per week for 30 minutes, get enough sleep, eat healthy foods, get into a good routine    Copy of summary sent to: In [facility] : Sepideh Moura [facility]:  [phone]  [facility]  Perla Toscano PA-C,     Follow up with psychiatrist / main caregiver: [facility].  Perla Toscano PA-C,       Next visit: 10/24/19    Follow up with your therapist: none listed   Next visit: he will determine    Go to group therapy and / or support groups at: [facility] Connection support groups, Community Support Program (CSP)    See your medical doctor about:  For an annual physical exam or any general wellness or illness as needed.    Other:  Your treatment team appreciates having the opportunity to work with you and wishes you the best.    Client Signature:____Alexi Rodas (absent today) Date / Time:_10/24/2019   1100  Staff Signature:_____Everett White, D,  L.P.    Date / Time:_10/24/2019  1100      "

## 2019-10-03 NOTE — TELEPHONE ENCOUNTER
Patient was scheduled for Medication Therapy Management visit to review medication after recent hospital discharge, but did not answer the phone x2.  A voicemail was left with information on where to call to reschedule the visit, should there be any medication questions or concerns.  Of note, it appears he was likely at Day Treatment at the time of scheduled call.    Allie Ventura, PharmD  Medication Therapy Management Pharmacist  [facility]  Phone: [phone]

## 2019-10-04 ASSESSMENT — ANXIETY QUESTIONNAIRES: GAD7 TOTAL SCORE: 4

## 2019-10-04 NOTE — PROGRESS NOTES
Adult Mental Health Day Treatment    PATIENT'S NAME: Natalio Rodas  MRN:   [id number]  :   1995  ACCT. NUMBER: [id number]  DATE OF SERVICE: 10/03/19  START TIME: 10:00  END TIME: 10:50    NUMBER OF PARTICIPANTS: 9 (2nd staff present)      Summary of Group / Topics Discussed:  Resiliency Development: Coping Skills: Aftercare Coping Cards: Patients were taught how to begin to develop a relapse management kit for both mental and substance use/abuse by creating individualized coping cards.    Patient Session Goals / Objectives:    Identified individualized coping skills they can use for effectively managing both mental health and substance abuse/abuse symptoms     Improved awareness of different types of coping skills and how to personalize them to their unique situation and circumstances      Established a plan for practice of these skills in their own environments    Practiced and reflected on how to generalize taught skills to their everyday life    Patient Participation / Response:  Moderately participated, sharing some personal reflections / insights and adequately adequately received / provided feedback with other participants.    Patient would benefit from additional opportunities to practice the content to be able to generalize it to their everyday life with increased intentionality, consistency, and efficacy in support of their psychiatric recovery    Treatment Plan:  Patient has an initial individualized treatment plan that was created as part of their diagnostic assessment / admission process.  A master individualized treatment plan is in the process of being developed with the patient and multi-disciplinary care team.

## 2019-10-07 ENCOUNTER — TELEPHONE (OUTPATIENT)
Dept: BEHAVIORAL HEALTH | Facility: CLINIC | Age: 24
End: 2019-10-07

## 2019-10-07 NOTE — TELEPHONE ENCOUNTER
Phone call to Alexi  Who said that he woke up late and couldn't make it to  The program.    Toshia Richard, Psjourdan., D,  L.P.

## 2019-10-08 ENCOUNTER — HOSPITAL ENCOUNTER (OUTPATIENT)
Dept: BEHAVIORAL HEALTH | Facility: CLINIC | Age: 24
End: 2019-10-08
Attending: PSYCHIATRY & NEUROLOGY
Payer: COMMERCIAL

## 2019-10-08 PROCEDURE — 90853 GROUP PSYCHOTHERAPY: CPT | Performed by: PSYCHOLOGIST

## 2019-10-08 PROCEDURE — G0177 OPPS/PHP; TRAIN & EDUC SERV: HCPCS

## 2019-10-08 NOTE — PROGRESS NOTES
Adult Mental Health Day Treatment  TRACK: 2A    PATIENT'S NAME: Natalio Rodas  MRN:   [id number]  :   1995  ACCT. NUMBER: [id number]  DATE OF SERVICE: 10/08/19  START TIME: 0900  END TIME: 0950    NUMBER OF PARTICIPANTS: 7      Summary of Group / Topics Discussed:  Resiliency Development: Self Awareness and Self Expression: Sense of Self: Patients explored and identified their strengths, emotions, barriers, skills, and behavior patterns that occur when changes happen in life.  Focus was on recognizing these aspects and developing ways to help support moving forward, making changes as needed to support resiliency.      Patient Session Goals / Objectives:    Identified emotions, barriers, skills, strengths, and behavior patterns that occur when changes happen in life and how to effectively cope     Improved awareness of the process of change and skills and strategies that support this       Established a plan for practice of these skills in their own environments    Practiced and reflected on how to generalize taught skills to their everyday life    Patient Participation / Response:  Moderately participated, sharing some personal reflections / insights and adequately adequately received / provided feedback with other participants.    Patient presentation: Calm,alert,focused with stable mood and thought process.Addressed issues related to irregular sleep schedule. Gets better sleep at night when he works during the day. Also tends to sllep better in the hospital because there is a regular time designated for sleep., Verbalized understanding of content and Patient would benefit from additional opportunities to practice the content to be able to generalize it to their everyday life with increased intentionality, consistency, and efficacy in support of their psychiatric recovery    Treatment Plan:  Patient has a current master individualized treatment plan.  See Epic treatment plan for more  information.
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
## 2019-10-10 ENCOUNTER — TELEPHONE (OUTPATIENT)
Dept: BEHAVIORAL HEALTH | Facility: CLINIC | Age: 24
End: 2019-10-10
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
## 2019-10-14 ENCOUNTER — TELEPHONE (OUTPATIENT)
Dept: BEHAVIORAL HEALTH | Facility: CLINIC | Age: 24
End: 2019-10-14

## 2019-10-14 NOTE — TELEPHONE ENCOUNTER
Phone call to Alexi who said that he was tired and wanted   To switch to the pm group.  A request to switch was made.    Israel White., D,  L.P.

## 2019-10-17 ENCOUNTER — TELEPHONE (OUTPATIENT)
Facility: CLINIC | Age: 24
End: 2019-10-17

## 2019-10-17 ENCOUNTER — TELEPHONE (OUTPATIENT)
Dept: BEHAVIORAL HEALTH | Facility: CLINIC | Age: 24
End: 2019-10-17
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
## 2019-10-21 ENCOUNTER — TELEPHONE (OUTPATIENT)
Dept: BEHAVIORAL HEALTH | Facility: CLINIC | Age: 24
End: 2019-10-21

## 2019-10-21 NOTE — TELEPHONE ENCOUNTER
Phone call to dad, emergency contact to locate Alexi  Who said that the police were at his door looking for  Alexi, as well.  Coordinated care with him.    Israel White., D,  L.P.

## 2019-10-21 NOTE — TELEPHONE ENCOUNTER
Phone call to [facility] : Sepideh Moura,  [phone]  Notified that Alexi has not attended the program.    Israel White., D,  L.P.

## 2019-10-21 NOTE — TELEPHONE ENCOUNTER
Phone call to Alexi Lafleur call- Phone call and message left to check on Natalio Rodas  Since Natalio Rodas did not attend today.    Israel White., D,  L.P.

## 2019-10-22 ENCOUNTER — TELEPHONE (OUTPATIENT)
Dept: BEHAVIORAL HEALTH | Facility: CLINIC | Age: 24
End: 2019-10-22

## 2019-10-22 NOTE — TELEPHONE ENCOUNTER
[facility]  [phone]  To leave a message that Alexi preferred to go to DBT  And not to [facility] and that he wanted to talk  With her about this.  Coordination of care .    Israel White., D,  L.P.

## 2019-10-22 NOTE — TELEPHONE ENCOUNTER
Phone call to Alexi, who said that he was interested in  Dialectical Behavioral Therapy  And will talk with his  about it    Israel White., D,  L.P.

## 2019-10-24 ENCOUNTER — APPOINTMENT (OUTPATIENT)
Dept: CT IMAGING | Facility: CLINIC | Age: 24
End: 2019-10-24
Attending: EMERGENCY MEDICINE
Payer: COMMERCIAL

## 2019-10-24 ENCOUNTER — TELEPHONE (OUTPATIENT)
Dept: BEHAVIORAL HEALTH | Facility: CLINIC | Age: 24
End: 2019-10-24

## 2019-10-24 ENCOUNTER — APPOINTMENT (OUTPATIENT)
Dept: GENERAL RADIOLOGY | Facility: CLINIC | Age: 24
DRG: 086 | End: 2019-10-24
Payer: COMMERCIAL

## 2019-10-24 ENCOUNTER — TELEPHONE (OUTPATIENT)
Facility: CLINIC | Age: 24
End: 2019-10-24

## 2019-10-24 ENCOUNTER — HOSPITAL ENCOUNTER (INPATIENT)
Facility: CLINIC | Age: 24
LOS: 1 days | Discharge: HOME | DRG: 086 | End: 2019-10-25
Attending: EMERGENCY MEDICINE | Admitting: SURGERY
Payer: COMMERCIAL

## 2019-10-24 ENCOUNTER — HOSPITAL ENCOUNTER (EMERGENCY)
Facility: CLINIC | Age: 24
Discharge: ANOTHER HOSPITAL | End: 2019-10-24
Attending: EMERGENCY MEDICINE | Admitting: EMERGENCY MEDICINE
Payer: COMMERCIAL

## 2019-10-24 VITALS
DIASTOLIC BLOOD PRESSURE: 76 MMHG | BODY MASS INDEX: 22.81 KG/M2 | OXYGEN SATURATION: 99 % | HEART RATE: 71 BPM | WEIGHT: 150 LBS | TEMPERATURE: 97.3 F | SYSTOLIC BLOOD PRESSURE: 123 MMHG | RESPIRATION RATE: 16 BRPM

## 2019-10-24 DIAGNOSIS — S02.32XA CLOSED BLOW-OUT FRACTURE OF LEFT ORBIT, INITIAL ENCOUNTER (H): ICD-10-CM

## 2019-10-24 DIAGNOSIS — S02.2XXA CLOSED FRACTURE OF NASAL BONE, INITIAL ENCOUNTER: ICD-10-CM

## 2019-10-24 DIAGNOSIS — T14.90XA TRAUMA: ICD-10-CM

## 2019-10-24 DIAGNOSIS — S02.30XA ORBITAL FLOOR (BLOW-OUT) CLOSED FRACTURE (H): ICD-10-CM
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
## 2019-10-24 DIAGNOSIS — S06.4X0A: ICD-10-CM

## 2019-10-24 DIAGNOSIS — S02.19XA: ICD-10-CM

## 2019-10-24 DIAGNOSIS — S50.01XA CONTUSION OF RIGHT ELBOW, INITIAL ENCOUNTER: ICD-10-CM

## 2019-10-24 LAB
ANION GAP SERPL CALCULATED.3IONS-SCNC: 6 MMOL/L (ref 3–14)
BASOPHILS # BLD AUTO: 0 10E9/L (ref 0–0.2)
BASOPHILS NFR BLD AUTO: 0.3 %
BUN SERPL-MCNC: 17 MG/DL (ref 7–30)
CALCIUM SERPL-MCNC: 9.5 MG/DL (ref 8.5–10.1)
CHLORIDE SERPL-SCNC: 96 MMOL/L (ref 94–109)
CO2 SERPL-SCNC: 29 MMOL/L (ref 20–32)
CREAT SERPL-MCNC: 0.85 MG/DL (ref 0.66–1.25)
DIFFERENTIAL METHOD BLD: ABNORMAL
DIFFERENTIAL METHOD BLD: NORMAL
EOSINOPHIL # BLD AUTO: 0 10E9/L (ref 0–0.7)
EOSINOPHIL NFR BLD AUTO: 0.3 %
ERYTHROCYTE [DISTWIDTH] IN BLOOD BY AUTOMATED COUNT: 13 % (ref 10–15)
ERYTHROCYTE [DISTWIDTH] IN BLOOD BY AUTOMATED COUNT: NORMAL % (ref 10–15)
GFR SERPL CREATININE-BSD FRML MDRD: >90 ML/MIN/{1.73_M2}
GLUCOSE SERPL-MCNC: 96 MG/DL (ref 70–99)
HCT VFR BLD AUTO: 46.4 % (ref 40–53)
HCT VFR BLD AUTO: NORMAL % (ref 40–53)
HGB BLD-MCNC: 16.1 G/DL (ref 13.3–17.7)
HGB BLD-MCNC: NORMAL G/DL (ref 13.3–17.7)
IMM GRANULOCYTES # BLD: 0 10E9/L (ref 0–0.4)
IMM GRANULOCYTES NFR BLD: 0.3 %
INR PPP: 1.05 (ref 0.86–1.14)
INR PPP: NORMAL (ref 0.86–1.14)
LYMPHOCYTES # BLD AUTO: 1.5 10E9/L (ref 0.8–5.3)
LYMPHOCYTES NFR BLD AUTO: 9.3 %
MCH RBC QN AUTO: 31.1 PG (ref 26.5–33)
MCH RBC QN AUTO: NORMAL PG (ref 26.5–33)
MCHC RBC AUTO-ENTMCNC: 34.7 G/DL (ref 31.5–36.5)
MCHC RBC AUTO-ENTMCNC: NORMAL G/DL (ref 31.5–36.5)
MCV RBC AUTO: 90 FL (ref 78–100)
MCV RBC AUTO: NORMAL FL (ref 78–100)
MONOCYTES # BLD AUTO: 1.4 10E9/L (ref 0–1.3)
MONOCYTES NFR BLD AUTO: 8.8 %
NEUTROPHILS # BLD AUTO: 12.8 10E9/L (ref 1.6–8.3)
NEUTROPHILS NFR BLD AUTO: 81 %
NRBC # BLD AUTO: 0 10*3/UL
NRBC BLD AUTO-RTO: 0 /100
PLATELET # BLD AUTO: 289 10E9/L (ref 150–450)
PLATELET # BLD AUTO: NORMAL 10E9/L (ref 150–450)
POTASSIUM SERPL-SCNC: 4.1 MMOL/L (ref 3.4–5.3)
RADIOLOGIST FLAGS: ABNORMAL
RBC # BLD AUTO: 5.17 10E12/L (ref 4.4–5.9)
RBC # BLD AUTO: NORMAL 10E12/L (ref 4.4–5.9)
SODIUM SERPL-SCNC: 131 MMOL/L (ref 133–144)
WBC # BLD AUTO: 15.8 10E9/L (ref 4–11)
WBC # BLD AUTO: NORMAL 10E9/L (ref 4–11)

## 2019-10-24 PROCEDURE — 25000128 H RX IP 250 OP 636: Performed by: SURGERY

## 2019-10-24 PROCEDURE — 12000001 ZZH R&B MED SURG/OB UMMC

## 2019-10-24 PROCEDURE — 96361 HYDRATE IV INFUSION ADD-ON: CPT | Performed by: EMERGENCY MEDICINE

## 2019-10-24 PROCEDURE — 99285 EMERGENCY DEPT VISIT HI MDM: CPT | Mod: 25

## 2019-10-24 PROCEDURE — 85025 COMPLETE CBC W/AUTO DIFF WBC: CPT | Performed by: EMERGENCY MEDICINE

## 2019-10-24 PROCEDURE — 96374 THER/PROPH/DIAG INJ IV PUSH: CPT

## 2019-10-24 PROCEDURE — 99285 EMERGENCY DEPT VISIT HI MDM: CPT | Mod: Z6 | Performed by: EMERGENCY MEDICINE

## 2019-10-24 PROCEDURE — 25000128 H RX IP 250 OP 636: Performed by: EMERGENCY MEDICINE

## 2019-10-24 PROCEDURE — 25000132 ZZH RX MED GY IP 250 OP 250 PS 637: Performed by: EMERGENCY MEDICINE

## 2019-10-24 PROCEDURE — 99285 EMERGENCY DEPT VISIT HI MDM: CPT | Mod: 25 | Performed by: EMERGENCY MEDICINE

## 2019-10-24 PROCEDURE — 25000132 ZZH RX MED GY IP 250 OP 250 PS 637: Performed by: SURGERY

## 2019-10-24 PROCEDURE — 80048 BASIC METABOLIC PNL TOTAL CA: CPT | Performed by: EMERGENCY MEDICINE

## 2019-10-24 PROCEDURE — 96360 HYDRATION IV INFUSION INIT: CPT | Performed by: EMERGENCY MEDICINE

## 2019-10-24 PROCEDURE — 70450 CT HEAD/BRAIN W/O DYE: CPT

## 2019-10-24 PROCEDURE — 73070 X-RAY EXAM OF ELBOW: CPT | Mod: RT

## 2019-10-24 PROCEDURE — 96375 TX/PRO/DX INJ NEW DRUG ADDON: CPT

## 2019-10-24 PROCEDURE — 68200002 ZZH TRAUMA EVALUATION W/O CC LEVEL II: Performed by: EMERGENCY MEDICINE

## 2019-10-24 PROCEDURE — 70480 CT ORBIT/EAR/FOSSA W/O DYE: CPT | Mod: XU

## 2019-10-24 PROCEDURE — 85610 PROTHROMBIN TIME: CPT | Performed by: EMERGENCY MEDICINE

## 2019-10-24 RX ORDER — SODIUM CHLORIDE 9 MG/ML
INJECTION, SOLUTION INTRAVENOUS CONTINUOUS
Status: DISCONTINUED | OUTPATIENT
Start: 2019-10-24 | End: 2019-10-25 | Stop reason: HOSPADM

## 2019-10-24 RX ORDER — HYDROCODONE BITARTRATE AND ACETAMINOPHEN 5; 325 MG/1; MG/1
2 TABLET ORAL ONCE
Status: COMPLETED | OUTPATIENT
Start: 2019-10-24 | End: 2019-10-24

## 2019-10-24 RX ORDER — POLYETHYLENE GLYCOL 3350 17 G/17G
17 POWDER, FOR SOLUTION ORAL 2 TIMES DAILY
Status: CANCELLED | OUTPATIENT
Start: 2019-10-24

## 2019-10-24 RX ORDER — ACETAMINOPHEN 500 MG
1000 TABLET ORAL ONCE
Status: COMPLETED | OUTPATIENT
Start: 2019-10-24 | End: 2019-10-24

## 2019-10-24 RX ORDER — ONDANSETRON 2 MG/ML
4 INJECTION INTRAMUSCULAR; INTRAVENOUS EVERY 30 MIN PRN
Status: DISCONTINUED | OUTPATIENT
Start: 2019-10-24 | End: 2019-10-24 | Stop reason: HOSPADM

## 2019-10-24 RX ORDER — LITHIUM CARBONATE 300 MG/1
300 TABLET, FILM COATED, EXTENDED RELEASE ORAL AT BEDTIME
Status: DISCONTINUED | OUTPATIENT
Start: 2019-10-24 | End: 2019-10-25 | Stop reason: HOSPADM

## 2019-10-24 RX ORDER — MORPHINE SULFATE 4 MG/ML
4 INJECTION, SOLUTION INTRAMUSCULAR; INTRAVENOUS
Status: DISCONTINUED | OUTPATIENT
Start: 2019-10-24 | End: 2019-10-24 | Stop reason: HOSPADM

## 2019-10-24 RX ADMIN — HYDROCODONE BITARTRATE AND ACETAMINOPHEN 2 TABLET: 5; 325 TABLET ORAL at 17:45

## 2019-10-24 RX ADMIN — ONDANSETRON 4 MG: 2 INJECTION INTRAMUSCULAR; INTRAVENOUS at 19:40

## 2019-10-24 RX ADMIN — SODIUM CHLORIDE 1000 ML: 9 INJECTION, SOLUTION INTRAVENOUS at 22:34

## 2019-10-24 RX ADMIN — MORPHINE SULFATE 4 MG: 4 INJECTION INTRAVENOUS at 19:40

## 2019-10-24 RX ADMIN — LITHIUM CARBONATE 300 MG: 300 TABLET, EXTENDED RELEASE ORAL at 23:21

## 2019-10-24 RX ADMIN — ACETAMINOPHEN 1000 MG: 500 TABLET, FILM COATED ORAL at 22:34

## 2019-10-24 ASSESSMENT — ENCOUNTER SYMPTOMS
HEADACHES: 1
ARTHRALGIAS: 1
VOMITING: 1
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
## 2019-10-24 NOTE — TELEPHONE ENCOUNTER
Phone call to [facility] : Sepideh Martell, [facility]: [phone]  , to inform her that Alexi has not attended in the past two weeks and needs to be discharged.   When asked, Alexi stated that he wanted to attend a DBT program,  And this was relayed to .    Alexi is on a Stay of Commitment      Toshia Richard, Israel., D,  L.P.  [facility]
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
## 2019-10-25 ENCOUNTER — APPOINTMENT (OUTPATIENT)
Dept: CT IMAGING | Facility: CLINIC | Age: 24
DRG: 086 | End: 2019-10-25
Payer: COMMERCIAL

## 2019-10-25 ENCOUNTER — TELEPHONE (OUTPATIENT)
Dept: OPHTHALMOLOGY | Facility: CLINIC | Age: 24
End: 2019-10-25

## 2019-10-25 VITALS
WEIGHT: 135.14 LBS | DIASTOLIC BLOOD PRESSURE: 65 MMHG | RESPIRATION RATE: 18 BRPM | TEMPERATURE: 98 F | HEART RATE: 62 BPM | SYSTOLIC BLOOD PRESSURE: 107 MMHG | OXYGEN SATURATION: 100 % | BODY MASS INDEX: 20.55 KG/M2

## 2019-10-25 DIAGNOSIS — S06.4XAA EPIDURAL HEMATOMA (H): Primary | ICD-10-CM
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
## 2019-10-25 LAB
ANION GAP SERPL CALCULATED.3IONS-SCNC: 6 MMOL/L (ref 3–14)
BUN SERPL-MCNC: 17 MG/DL (ref 7–30)
CALCIUM SERPL-MCNC: 8.9 MG/DL (ref 8.5–10.1)
CHLORIDE SERPL-SCNC: 102 MMOL/L (ref 94–109)
CO2 SERPL-SCNC: 30 MMOL/L (ref 20–32)
CREAT SERPL-MCNC: 0.96 MG/DL (ref 0.66–1.25)
ERYTHROCYTE [DISTWIDTH] IN BLOOD BY AUTOMATED COUNT: 12.9 % (ref 10–15)
GFR SERPL CREATININE-BSD FRML MDRD: >90 ML/MIN/{1.73_M2}
GLUCOSE SERPL-MCNC: 89 MG/DL (ref 70–99)
HCT VFR BLD AUTO: 45 % (ref 40–53)
HGB BLD-MCNC: 14.4 G/DL (ref 13.3–17.7)
MCH RBC QN AUTO: 30 PG (ref 26.5–33)
MCHC RBC AUTO-ENTMCNC: 32 G/DL (ref 31.5–36.5)
MCV RBC AUTO: 94 FL (ref 78–100)
PLATELET # BLD AUTO: 255 10E9/L (ref 150–450)
POTASSIUM SERPL-SCNC: 3.6 MMOL/L (ref 3.4–5.3)
RADIOLOGIST FLAGS: ABNORMAL
RADIOLOGIST FLAGS: ABNORMAL
RBC # BLD AUTO: 4.8 10E12/L (ref 4.4–5.9)
SODIUM SERPL-SCNC: 137 MMOL/L (ref 133–144)
WBC # BLD AUTO: 11.7 10E9/L (ref 4–11)

## 2019-10-25 PROCEDURE — 25000132 ZZH RX MED GY IP 250 OP 250 PS 637: Performed by: SURGERY

## 2019-10-25 PROCEDURE — 25000132 ZZH RX MED GY IP 250 OP 250 PS 637: Performed by: NURSE PRACTITIONER

## 2019-10-25 PROCEDURE — 25800030 ZZH RX IP 258 OP 636: Performed by: SURGERY

## 2019-10-25 PROCEDURE — 12000001 ZZH R&B MED SURG/OB UMMC

## 2019-10-25 PROCEDURE — 70450 CT HEAD/BRAIN W/O DYE: CPT | Mod: 76

## 2019-10-25 PROCEDURE — 70450 CT HEAD/BRAIN W/O DYE: CPT

## 2019-10-25 PROCEDURE — 80048 BASIC METABOLIC PNL TOTAL CA: CPT | Performed by: SURGERY

## 2019-10-25 PROCEDURE — 99239 HOSP IP/OBS DSCHRG MGMT >30: CPT | Performed by: INTERNAL MEDICINE

## 2019-10-25 PROCEDURE — 85027 COMPLETE CBC AUTOMATED: CPT | Performed by: SURGERY

## 2019-10-25 PROCEDURE — 36415 COLL VENOUS BLD VENIPUNCTURE: CPT | Performed by: SURGERY

## 2019-10-25 RX ORDER — LITHIUM CARBONATE 300 MG/1
TABLET, FILM COATED, EXTENDED RELEASE ORAL
COMMUNITY
End: 2019-10-27

## 2019-10-25 RX ORDER — OXYCODONE HYDROCHLORIDE 5 MG/1
5 TABLET ORAL EVERY 6 HOURS PRN
Qty: 10 TABLET | Refills: 0 | Status: SHIPPED | OUTPATIENT
Start: 2019-10-25 | End: 2019-11-05

## 2019-10-25 RX ORDER — POTASSIUM CHLORIDE 7.45 MG/ML
10 INJECTION INTRAVENOUS
Status: DISCONTINUED | OUTPATIENT
Start: 2019-10-25 | End: 2019-10-25 | Stop reason: HOSPADM

## 2019-10-25 RX ORDER — ERYTHROMYCIN 5 MG/G
OINTMENT OPHTHALMIC AT BEDTIME
Qty: 3.5 G | Refills: 0 | Status: SHIPPED | OUTPATIENT
Start: 2019-10-25 | End: 2024-07-12

## 2019-10-25 RX ORDER — ACETAMINOPHEN 500 MG
1000 TABLET ORAL 3 TIMES DAILY
Status: DISCONTINUED | OUTPATIENT
Start: 2019-10-25 | End: 2019-10-25 | Stop reason: HOSPADM

## 2019-10-25 RX ORDER — POTASSIUM CL/LIDO/0.9 % NACL 10MEQ/0.1L
10 INTRAVENOUS SOLUTION, PIGGYBACK (ML) INTRAVENOUS
Status: DISCONTINUED | OUTPATIENT
Start: 2019-10-25 | End: 2019-10-25 | Stop reason: HOSPADM

## 2019-10-25 RX ORDER — MAGNESIUM SULFATE HEPTAHYDRATE 40 MG/ML
4 INJECTION, SOLUTION INTRAVENOUS EVERY 4 HOURS PRN
Status: DISCONTINUED | OUTPATIENT
Start: 2019-10-25 | End: 2019-10-25 | Stop reason: HOSPADM

## 2019-10-25 RX ORDER — ONDANSETRON 2 MG/ML
4 INJECTION INTRAMUSCULAR; INTRAVENOUS EVERY 6 HOURS PRN
Status: DISCONTINUED | OUTPATIENT
Start: 2019-10-25 | End: 2019-10-25 | Stop reason: HOSPADM

## 2019-10-25 RX ORDER — PROCHLORPERAZINE MALEATE 5 MG
10 TABLET ORAL EVERY 6 HOURS PRN
Status: DISCONTINUED | OUTPATIENT
Start: 2019-10-25 | End: 2019-10-25 | Stop reason: HOSPADM

## 2019-10-25 RX ORDER — ACETAMINOPHEN 500 MG
1000 TABLET ORAL 3 TIMES DAILY
Qty: 60 TABLET | Refills: 0 | Status: SHIPPED | OUTPATIENT
Start: 2019-10-25 | End: 2024-07-12

## 2019-10-25 RX ORDER — POTASSIUM CHLORIDE 1.5 G/1.58G
20-40 POWDER, FOR SOLUTION ORAL
Status: DISCONTINUED | OUTPATIENT
Start: 2019-10-25 | End: 2019-10-25 | Stop reason: HOSPADM

## 2019-10-25 RX ORDER — POTASSIUM CHLORIDE 750 MG/1
20-40 TABLET, EXTENDED RELEASE ORAL
Status: DISCONTINUED | OUTPATIENT
Start: 2019-10-25 | End: 2019-10-25 | Stop reason: HOSPADM

## 2019-10-25 RX ORDER — PROCHLORPERAZINE 25 MG
25 SUPPOSITORY, RECTAL RECTAL EVERY 12 HOURS PRN
Status: DISCONTINUED | OUTPATIENT
Start: 2019-10-25 | End: 2019-10-25 | Stop reason: HOSPADM

## 2019-10-25 RX ORDER — NALOXONE HYDROCHLORIDE 0.4 MG/ML
.1-.4 INJECTION, SOLUTION INTRAMUSCULAR; INTRAVENOUS; SUBCUTANEOUS
Status: DISCONTINUED | OUTPATIENT
Start: 2019-10-25 | End: 2019-10-25 | Stop reason: HOSPADM

## 2019-10-25 RX ORDER — ONDANSETRON 4 MG/1
4 TABLET, ORALLY DISINTEGRATING ORAL EVERY 6 HOURS PRN
Status: DISCONTINUED | OUTPATIENT
Start: 2019-10-25 | End: 2019-10-25 | Stop reason: HOSPADM

## 2019-10-25 RX ORDER — POTASSIUM CHLORIDE 29.8 MG/ML
20 INJECTION INTRAVENOUS
Status: DISCONTINUED | OUTPATIENT
Start: 2019-10-25 | End: 2019-10-25 | Stop reason: HOSPADM

## 2019-10-25 RX ORDER — OXYCODONE HYDROCHLORIDE 5 MG/1
5-10 TABLET ORAL
Status: DISCONTINUED | OUTPATIENT
Start: 2019-10-25 | End: 2019-10-25 | Stop reason: HOSPADM

## 2019-10-25 RX ORDER — ERYTHROMYCIN 5 MG/G
OINTMENT OPHTHALMIC AT BEDTIME
Status: DISCONTINUED | OUTPATIENT
Start: 2019-10-25 | End: 2019-10-25 | Stop reason: HOSPADM

## 2019-10-25 RX ORDER — LITHIUM CARBONATE 300 MG/1
600 TABLET, FILM COATED, EXTENDED RELEASE ORAL DAILY
Status: DISCONTINUED | OUTPATIENT
Start: 2019-10-25 | End: 2019-10-25 | Stop reason: HOSPADM

## 2019-10-25 RX ORDER — HYDROMORPHONE HCL/0.9% NACL/PF 0.2MG/0.2
0.2 SYRINGE (ML) INTRAVENOUS
Status: DISCONTINUED | OUTPATIENT
Start: 2019-10-25 | End: 2019-10-25 | Stop reason: HOSPADM

## 2019-10-25 RX ORDER — LIDOCAINE 40 MG/G
CREAM TOPICAL
Status: DISCONTINUED | OUTPATIENT
Start: 2019-10-25 | End: 2019-10-25 | Stop reason: HOSPADM

## 2019-10-25 RX ADMIN — SODIUM CHLORIDE: 9 INJECTION, SOLUTION INTRAVENOUS at 02:13

## 2019-10-25 RX ADMIN — OXYCODONE HYDROCHLORIDE 5 MG: 5 TABLET ORAL at 01:52

## 2019-10-25 RX ADMIN — LITHIUM CARBONATE 600 MG: 300 TABLET, EXTENDED RELEASE ORAL at 08:41

## 2019-10-25 RX ADMIN — OXYCODONE HYDROCHLORIDE 5 MG: 5 TABLET ORAL at 08:46

## 2019-10-25 RX ADMIN — DEXTRAN 70 AND HYPROMELLOSE 2910 1 DROP: 1; 3 SOLUTION/ DROPS OPHTHALMIC at 11:13

## 2019-10-25 RX ADMIN — POTASSIUM CHLORIDE 20 MEQ: 750 TABLET, EXTENDED RELEASE ORAL at 11:13

## 2019-10-25 RX ADMIN — ACETAMINOPHEN 1000 MG: 500 TABLET, FILM COATED ORAL at 08:41

## 2019-10-25 ASSESSMENT — ACTIVITIES OF DAILY LIVING (ADL)
FALL_HISTORY_WITHIN_LAST_SIX_MONTHS: NO
BATHING: 0-->INDEPENDENT
RETIRED_COMMUNICATION: 0-->UNDERSTANDS/COMMUNICATES WITHOUT DIFFICULTY
ADLS_ACUITY_SCORE: 13
SWALLOWING: 0-->SWALLOWS FOODS/LIQUIDS WITHOUT DIFFICULTY
RETIRED_EATING: 0-->INDEPENDENT
COGNITION: 0 - NO COGNITION ISSUES REPORTED
TRANSFERRING: 0-->INDEPENDENT
ADLS_ACUITY_SCORE: 10
TOILETING: 0-->INDEPENDENT
AMBULATION: 0-->INDEPENDENT
DRESS: 0-->INDEPENDENT
ADLS_ACUITY_SCORE: 13
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
## 2019-10-25 NOTE — ED NOTES
[facility], [place]   ED Nurse to Floor Handoff     Natalio Rodas is a 24 year old male who speaks English and lives alone,  in a home  They arrived in the ED by ambulance from emergency room    ED Chief Complaint: Head Injury    ED Dx;   Final diagnoses:   None         Needed?: No    Allergies: No Known Allergies.  Past Medical Hx:   Past Medical History:   Diagnosis Date     Concussion      Schizophrenia (H)       Baseline Mental status: WDL  Current Mental Status changes: at basesline    Infection present or suspected this encounter: no  Sepsis suspected: No  Isolation type: No active isolations     Activity level - Baseline/Home:  Independent  Activity Level - Current:   Stand with Assist    Bariatric equipment needed?: No    In the ED these meds were given:   Medications   sodium chloride 0.9% infusion (has no administration in time range)   0.9% sodium chloride BOLUS (1,000 mLs Intravenous New Bag 10/24/19 2234)   lithium ER (LITHOBID/ESKALITH CR) CR tablet 300 mg (has no administration in time range)   acetaminophen (TYLENOL) tablet 1,000 mg (1,000 mg Oral Given 10/24/19 2234)       Drips running?  No    Home pump  No    Current LDAs  Peripheral IV 10/24/19 Right Lower forearm (Active)   Site Assessment WDL 10/24/2019  9:38 PM   Number of days: 0       Labs results: Labs Ordered and Resulted from Time of ED Arrival Up to the Time of Departure from the ED - No data to display    Imaging Studies:   Recent Results (from the past 24 hour(s))   CT Head w/o Contrast   Result Value    Radiologist flags Acute intracranial hemorrhage (AA)    Narrative    CT SCAN OF THE HEAD WITHOUT CONTRAST   10/24/2019 6:36 PM     HISTORY: Head trauma, minor, GCS>=13, low clinical risk, initial exam.    TECHNIQUE:  Axial images of the head and coronal reformations without  IV contrast material. Radiation dose for this scan was reduced using  automated exposure control, adjustment of the  mA and/or kV according  to patient size, or iterative reconstruction technique.    COMPARISON: None.    FINDINGS: Hyperdense extra-axial hematoma along the anterior right  cerebral convexity, this measures up to 1 cm in width on the coronal  images. This is concerning for an epidural hematoma. Extra-axial  hematoma also extends into the anterior right middle cranial fossa.  Mild mass effect on the right cerebral hemisphere with 2 mm of  leftward midline shift. Ventricular size is within normal limits  without evidence of hydrocephalus. The basal cisterns are patent.    No definite acute intraparenchymal hemorrhage. Gray-white matter  differentiation appears grossly intact.    Multiple vertex scalp soft tissue injuries.    No definite fracture of the right calvarium near the region of  extra-axial hemorrhage.    Please see orbital CT for details of facial bones including multiple  left-sided facial bone fractures which extend intracranially.      Impression    IMPRESSION:     1. Acute extra-axial hemorrhage along the anterior right cerebral  convexity also extending to the anterior aspect of the right middle  cranial fossa. This measures up to 1 cm in width. Appearance is  concerning for an epidural hematoma. No definite fracture of the right  calvarium in the region of extra-axial hemorrhage.  2. Mild mass effect on the right cerebral hemisphere and 2 mm of  leftward midline shift. No evidence of hydrocephalus at this time.  3. Multiple vertex scalp soft tissue injuries.  4. Please see dedicated orbital CT for details of facial bone  fractures including fractures that extend intracranially.    [Critical Result: Acute intracranial hemorrhage]    Finding was identified on 10/24/2019 6:52 PM.     AMK GLEASON was contacted by Dr. Nguyen on 10/24/2019 7:10 PM and  verbalized understanding of the critical result.     DINAH NGUYEN MD   CT Orbits wo Contrast    Narrative    CT SCAN OF THE ORBITS AND FACE WITHOUT  CONTRAST   10/24/2019 6:36 PM     HISTORY: Trauma.    TECHNIQUE: CT images of the orbits were obtained without contrast.    Radiation dose for this scan was reduced using automated exposure  control, adjustment of the mA and/or kV according to patient size, or  iterative reconstruction technique.     COMPARISON: Facial bone CT 8/1/2019.    FINDINGS: Multiple left-sided facial bone fractures are present.    There are fractures through the left superior orbital wall which  extend posteriorly to the orbital apex. These fractures extend into  the intracranial contents of the fracture involving the left clinoid  process. Fractures also involve the medial and inferior left orbital  walls. Mild depression of the medial left orbital wall fracture. There  is a blowout fracture of the inferior left orbital wall with orbital  fat extending inferiorly through the fracture. The inferior rectus  muscle abuts the fracture fragment and may be involved with the  fracture.    There is subtle fracture visualized through the left cribriform plate  (axial series 3 image 48).    Bilateral nasal bone fractures with rightward deviation of the nasal  bones. These fractures are different compared to prior CT 8/1/2019 and  may be acute.    Soft tissue swelling over the left periorbital region and left nasal  bridge.    Hyperdense debris present within the paranasal sinuses particularly  the left greater than right ethmoid air cells and in the left  maxillary sinus. There is a layering hematocrit layer within the left  maxillary sinus.      Impression    IMPRESSION:   1. Extensive left-sided facial bone fractures as detailed above with  fractures of the superior, medial, and inferior left orbit, fracture  of the cribriform plate, and fractures of the nasal bones. This may be  partially classified as a naso-orbitoethmoid complex fracture. Marked  soft tissue swelling over the left periorbital region and along the  left nasal bridge.  2. The  superior left orbital fracture extends posteriorly near the  orbital apex which raises concern for injury to the neural vascular  structures coursing through the orbital apex.  3. The superior orbital fracture also extends intracranially and  appears to involve the anterior left clinoid process.  4. Blowout fracture of the inferior left orbital wall with herniation  of fat through the fracture. The left inferior rectus muscle appears  to be involved with the fracture and is herniated inferiorly.  Recommend clinical correlation for possible entrapment.  5. Fracture through the left cribriform plate. This raises concern for  possible CSF leak into the ethmoid air cells.  6. Marked hyperdense debris within the paranasal sinuses including the  ethmoid air cells and left maxillary sinus. This likely represents  hemorrhage although a superimposed CSF leak is not excluded.    Results discussed with Deanne Lee at 7:10 PM on 10/24/2019.      DINAH NGUYEN MD       Recent vital signs:   BP (!) 120/98   Temp 98.5  F (36.9  C) (Oral)   Resp 18   SpO2 100%     Elayne Coma Scale Score: 15 (10/24/19 2139)       Cardiac Rhythm: Normal Sinus  Pt needs tele? No  Skin/wound Issues: Wound to L orbit    Code Status: See Epic    Pain control: fair    Nausea control: pt had none    Abnormal labs/tests/findings requiring intervention:     Family present during ED course? No   Family Comments/Social Situation comments:     Tasks needing completion: None    Andrea Sesay, RN  [phone] [facility]
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
## 2019-10-25 NOTE — DISCHARGE SUMMARY
Pt was given discharge information, informed of follow-up appointments, and medications were explained. Pt verbalized understanding of discharge teaching and was discharged to home at 1230.

## 2019-10-25 NOTE — PHARMACY-ADMISSION MEDICATION HISTORY
Admission medication history interview status for the 10/24/2019 admission is complete. See Epic admission navigator for allergy information, pharmacy, prior to admission medications and immunization status.     Medication history interview sources:  patient, [facility]    Changes made to PTA medication list (reason)  Added: none  Deleted: none  Changed: none    Additional medication history information (including reliability of information, actions taken by pharmacist):  -Patient's only med prior to admission was lithium. Dosing verified per [facility]. Last filled 9/24/19.      Prior to Admission medications    Medication Sig Last Dose Taking? Auth Provider   lithium ER (LITHOBID/ESKALITH CR) 300 MG CR tablet Take 1 tab (300 mg) by mouth every morning and take 2 tabs (600 mg) by mouth at bedtime. Past Week at Unknown time Yes Unknown, Entered By History         Medication history completed by:   Stephany Michel, [facility], PD4  October 25, 2019
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
## 2019-10-26 NOTE — PROGRESS NOTES
Orthopedic Surgery Brief Consult Note    Orthopedic surgery consulted for right olecranon fracture splinting.  Per chart review, the patient was placed in a posterior slab splint in the emergency department.  This was confirmed after discussion with the trauma team.  The patient has no break in the skin and is neurovascularly intact.    Radiographs of the right elbow obtained 10/24/2019 were reviewed. These demonstrate a mildly displaced olecranon fracture which is likely an avulsion injury.  There are no sharp edges and the skin does not appear to be tented.    Recommend posterior slab splint as already placed.  Follow-up with orthopedic surgery in a week.  This was communicated to primary team.    If additional questions, please contact orthopedic surgery for formal consult.    Rylan Coughlin MD  Orthopedic Surgery PGY1  [phone]    Please page me directly with any questions/concerns during regular weekday hours before 5 pm. If there is no response, if it is a weekend, or if it is during evening hours then please page the orthopedic surgery resident on call.

## 2019-10-27 ENCOUNTER — HOSPITAL ENCOUNTER (EMERGENCY)
Facility: CLINIC | Age: 24
Discharge: HOME | End: 2019-10-27
Attending: PSYCHIATRY & NEUROLOGY | Admitting: PSYCHIATRY & NEUROLOGY
Payer: COMMERCIAL

## 2019-10-27 VITALS
OXYGEN SATURATION: 100 % | WEIGHT: 134.4 LBS | TEMPERATURE: 98.6 F | BODY MASS INDEX: 20.44 KG/M2 | HEART RATE: 72 BPM | DIASTOLIC BLOOD PRESSURE: 79 MMHG | SYSTOLIC BLOOD PRESSURE: 122 MMHG | RESPIRATION RATE: 12 BRPM

## 2019-10-27 DIAGNOSIS — F25.0 SCHIZOAFFECTIVE DISORDER, BIPOLAR TYPE (H): ICD-10-CM

## 2019-10-27 DIAGNOSIS — Z76.0 ENCOUNTER FOR MEDICATION REFILL: ICD-10-CM

## 2019-10-27 PROCEDURE — 99283 EMERGENCY DEPT VISIT LOW MDM: CPT | Performed by: PSYCHIATRY & NEUROLOGY

## 2019-10-27 PROCEDURE — 99283 EMERGENCY DEPT VISIT LOW MDM: CPT | Mod: Z6 | Performed by: PSYCHIATRY & NEUROLOGY

## 2019-10-27 PROCEDURE — 25000132 ZZH RX MED GY IP 250 OP 250 PS 637: Performed by: PSYCHIATRY & NEUROLOGY

## 2019-10-27 RX ORDER — LITHIUM CARBONATE 300 MG/1
TABLET, FILM COATED, EXTENDED RELEASE ORAL
Qty: 90 TABLET | Refills: 0 | Status: SHIPPED | OUTPATIENT
Start: 2019-10-27 | End: 2024-07-12

## 2019-10-27 RX ORDER — ACETAMINOPHEN 500 MG
1000 TABLET ORAL ONCE
Status: COMPLETED | OUTPATIENT
Start: 2019-10-27 | End: 2019-10-27

## 2019-10-27 RX ADMIN — ACETAMINOPHEN 1000 MG: 500 TABLET ORAL at 13:31

## 2019-10-27 ASSESSMENT — ENCOUNTER SYMPTOMS
GASTROINTESTINAL NEGATIVE: 1
HEMATOLOGIC/LYMPHATIC NEGATIVE: 1
MUSCULOSKELETAL NEGATIVE: 1
NEUROLOGICAL NEGATIVE: 1
RESPIRATORY NEGATIVE: 1
EYES NEGATIVE: 1
PSYCHIATRIC NEGATIVE: 1
HALLUCINATIONS: 0
HYPERACTIVE: 0
CARDIOVASCULAR NEGATIVE: 1
CONSTITUTIONAL NEGATIVE: 1
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
## 2019-10-27 NOTE — DISCHARGE INSTRUCTIONS
Continue lithium dose as prescribed  Reschedule your missed medication appointment through [facility] for follow-up  Follow-up established care and services
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
## 2019-10-27 NOTE — ED NOTES
Pt brought over from the Main ED.  Pt told of the plan of care in [facility] and stated understanding.  Pt waiting to be seen the provider.

## 2019-10-27 NOTE — ED AVS SNAPSHOT
[facility], Emergency Department  [address]  [place] [address]  Phone:  [phone]  Fax:  [phone]                                    Natalio Rodas   MRN: [id number]    Department:  [facility], Emergency Department   Date of Visit:  10/27/2019           After Visit Summary Signature Page    I have received my discharge instructions, and my questions have been answered. I have discussed any challenges I see with this plan with the nurse or doctor.    ..........................................................................................................................................  Patient/Patient Representative Signature      ..........................................................................................................................................  Patient Representative Print Name and Relationship to Patient    ..................................................               ................................................  Date                                   Time    ..........................................................................................................................................  Reviewed by Signature/Title    ...................................................              ..............................................  Date                                               Time          22EPIC Rev 08/18

## 2019-10-28 ENCOUNTER — PATIENT OUTREACH (OUTPATIENT)
Dept: CARE COORDINATION | Facility: CLINIC | Age: 24
End: 2019-10-28

## 2019-10-28 NOTE — TELEPHONE ENCOUNTER
DIAGNOSIS: POSSIBLE ELBOW FRACTURE   APPOINTMENT DATE: 10/31/2019   NOTES STATUS DETAILS   OFFICE NOTE from referring provider Internal 10/24/2019   OFFICE NOTE from other specialist N/A    DISCHARGE SUMMARY from hospital Internal 10/24/2019   DISCHARGE REPORT from the ER Internal 10/24/2019   OPERATIVE REPORT N/A    MEDICATION LIST Internal TYLENOL OXCODONE   MRI N/A    CT SCAN Internal 10/24/2019   XRAYS (IMAGES & REPORTS) Internal 10/24/2019

## 2019-10-28 NOTE — TELEPHONE ENCOUNTER
FUTURE VISIT INFORMATION      FUTURE VISIT INFORMATION:    Date: 10/31/2019     Time: 8:00 AM    Location: [facility]   REFERRAL INFORMATION:    Referring provider:  N/A    Referring providers clinic:  [facility]     Reason for visit/diagnosis  1 week follow up for Facial Fractures     RECORDS REQUESTED FROM:       Clinic name Comments Records Status Imaging Status   [facility] 10/24/19 ED visit     CT Head: 10/25/19 [facility] Emergency Department 8/1/19 ED visit     CT Orbits: 10/24/19  CT Head: 10/24/19  CT Facial Bones: 8/1/19  XR Nasal Bones: 8/1/19 EPIC PACS

## 2019-10-31 ENCOUNTER — TELEPHONE (OUTPATIENT)
Dept: OTOLARYNGOLOGY | Facility: CLINIC | Age: 24
End: 2019-10-31

## 2019-10-31 ENCOUNTER — PRE VISIT (OUTPATIENT)
Dept: OTOLARYNGOLOGY | Facility: CLINIC | Age: 24
End: 2019-10-31

## 2019-10-31 ENCOUNTER — PRE VISIT (OUTPATIENT)
Dept: ORTHOPEDICS | Facility: CLINIC | Age: 24
End: 2019-10-31

## 2019-10-31 DIAGNOSIS — S52.021A CLOSED FRACTURE OF RIGHT OLECRANON PROCESS, INITIAL ENCOUNTER: Primary | ICD-10-CM

## 2019-10-31 NOTE — TELEPHONE ENCOUNTER
Called patient to see if he is interested in rescheduling the hospital f/u appointment with Sharmila Boogie, as he didn't make it in for his scheduled appointment today (10/31).    Left a VM with the patient and provided the ENT call center number for him to call back if he is interested in rescheduling.

## 2019-11-01 ENCOUNTER — TELEPHONE (OUTPATIENT)
Dept: ORTHOPEDICS | Facility: CLINIC | Age: 24
End: 2019-11-01

## 2019-11-01 NOTE — TELEPHONE ENCOUNTER
[facility] Call Center    Phone Message    May a detailed message be left on voicemail: yes    Reason for Call: Other: Mom called in to reschedule.  Please call her back asap.      Action Taken: Message routed to:  [facility] ENT

## 2019-11-01 NOTE — TELEPHONE ENCOUNTER
DIAGNOSIS: Right midly displaced elbow fracture-  stated this was surgical   APPOINTMENT DATE: 11.4.19   NOTES STATUS DETAILS   OFFICE NOTE from referring provider Internal 11.1.19 Dr. Garcia, Telephone encounter   OFFICE NOTE from other specialist N/A    DISCHARGE SUMMARY from hospital N/A    DISCHARGE REPORT from the ER Internal 10.24.19   OPERATIVE REPORT N/A    MEDICATION LIST Internal    IMPLANT RECORD/STICKER N/A    LABS     CBC/DIFF Internal 10.25.19   CULTURES N/A    INJECTIONS DONE IN RADIOLOGY N/A    MRI N/A    CT SCAN N/A    XRAYS (IMAGES & REPORTS) Internal 10.24.19   TUMOR     PATHOLOGY  Slides & report N/A

## 2019-11-01 NOTE — TELEPHONE ENCOUNTER
[facility] Call Center    Phone Message    May a detailed message be left on voicemail: yes    Reason for Call: Other: Pt's mom called to reschedule the appointment the pt missed yesterday for his fractured elbow. Pt missed the appointment due to circumstances outside of his control. She is wondering if we could get him in any time early next week. Currently, the soonest appointment with any provider isn't until 11/12. Please give pt's mom a call back to discuss availability.     Action Taken: Message routed to:  [facility] Sports Med

## 2019-11-01 NOTE — TELEPHONE ENCOUNTER
Called mother back to inform that while on 's schedule on 10/31, thought that he should see a surgeon due to his elbow being mildly displaced. We did put in xrays to check the alignment, he didn't show up to his appointment and  wanted to make sure that he be seen be a surgeon for this fracture. He was placed on  schedule on 11/4.

## 2019-11-04 ENCOUNTER — TELEPHONE (OUTPATIENT)
Dept: SURGERY | Facility: CLINIC | Age: 24
End: 2019-11-04

## 2019-11-04 ENCOUNTER — PRE VISIT (OUTPATIENT)
Dept: ORTHOPEDICS | Facility: CLINIC | Age: 24
End: 2019-11-04

## 2019-11-04 ENCOUNTER — PRE VISIT (OUTPATIENT)
Dept: SURGERY | Facility: CLINIC | Age: 24
End: 2019-11-04

## 2019-11-04 ENCOUNTER — OFFICE VISIT (OUTPATIENT)
Dept: OTOLARYNGOLOGY | Facility: CLINIC | Age: 24
End: 2019-11-04
Payer: COMMERCIAL

## 2019-11-04 ENCOUNTER — OFFICE VISIT (OUTPATIENT)
Dept: ORTHOPEDICS | Facility: CLINIC | Age: 24
End: 2019-11-04
Payer: COMMERCIAL

## 2019-11-04 VITALS
HEIGHT: 70 IN | TEMPERATURE: 98.5 F | SYSTOLIC BLOOD PRESSURE: 134 MMHG | WEIGHT: 135 LBS | DIASTOLIC BLOOD PRESSURE: 81 MMHG | BODY MASS INDEX: 19.33 KG/M2 | OXYGEN SATURATION: 100 % | HEART RATE: 93 BPM | RESPIRATION RATE: 17 BRPM

## 2019-11-04 VITALS
WEIGHT: 134 LBS | BODY MASS INDEX: 19.18 KG/M2 | HEIGHT: 70 IN | HEART RATE: 72 BPM | DIASTOLIC BLOOD PRESSURE: 79 MMHG | SYSTOLIC BLOOD PRESSURE: 122 MMHG

## 2019-11-04 DIAGNOSIS — S52.021A CLOSED FRACTURE OF RIGHT OLECRANON PROCESS, INITIAL ENCOUNTER: Primary | ICD-10-CM

## 2019-11-04 DIAGNOSIS — S02.32XD: ICD-10-CM
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
## 2019-11-04 DIAGNOSIS — S02.2XXD CLOSED FRACTURE OF NASAL BONE WITH ROUTINE HEALING, SUBSEQUENT ENCOUNTER: ICD-10-CM

## 2019-11-04 DIAGNOSIS — S02.19XS: ICD-10-CM

## 2019-11-04 ASSESSMENT — MIFFLIN-ST. JEOR
SCORE: 1614.07
SCORE: 1618.61

## 2019-11-04 NOTE — TELEPHONE ENCOUNTER
11/4/19    Pt was called to verify location as it appeared he was on the 4th floor instead of 5th for his PAC appointment.  Pt. Was in a vehicle and stated that he was not able to make his appointment and needed to reschedule.  Pt stated that appointment needed to be after 1600, I told him that he's options were 1600 or 1700.  Pt chose the 1600 slot and was rescheduled.

## 2019-11-04 NOTE — LETTER
November 4, 2019      TO: Natalio Rodas  [address] [place] [address]         To Whom it May Concern,      Natalio Rodas is being evaluated in the Orthopedic clinic today 11-4-19 for a fracture injury and he needs emergent Orthopedic surgery which is being scheduled for tomorrow 11-5-19.    He is also currently being evaluated by his ENT today who is also in the same location.    He is scheduled to have a pre-operative H&P visit today on the PAC clinic in the same building at 2:15 PM as a requirement for surgery.     If you have any questions please contact the Orthopedic clinic.      Sincerely,      Vahid Goncalves MD

## 2019-11-04 NOTE — PATIENT INSTRUCTIONS
Natalio Rodas,    It was a pleasure to see you today.    1. You were seen in the ENT Clinic today by Sharmila Boogie PA-C    If you have any questions or concerns after your appointment, please call   - Option 1: ENT Clinic: [phone]      2. Continued follow up with Ophthalmology, NeuroSurgery and Ortho as scheduled.    3.  For the OR:   -OK for surgery.     -Absolutely no nasal intubation or instrumentation.   -Avoid positive pressure ventilation via mask as this could cause facial emphysema due to the cribriform plate fracture.    4.  Return as needed.        Thank you,  Sharmila Boogie PA-C  Otolaryngology  Head & Neck Surgery  [phone]
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
## 2019-11-04 NOTE — NURSING NOTE
Teaching Flowsheet   Relevant Diagnosis: Closed fracture right olecranon process  Teaching Topic: Open reduction internal fixation right olecranon fracture    CSC under MAC with regional block anesthesia with Dr Vahid Goncalves, surgery order requested for tomorrow 11-5-19  Consent obtained  Patient on house arrest  Diagnosed with paranoid schizophrenia and ADHD, also marijuana use.  These factors did seem to affect teaching and scheduling today.    Patient had ENT visit for 3 pm tomorrow.  ENT Sharmila Boogie kindly saw patient today in clinic instead of tomorrow in anticipation of surgery tomorrow.    Patient was unable/unwilling to go to his PAC visit today, citing time constraints and anxiety.    Received phone call from PAC center after patient left our clinic. He rescheduled PAC for 4 pm tomorrow.      Per Dr Goncalves the only day she is available to do surgery this week is tomorrow (Tuesday).   Patient willing to see PAC at 9:30 AM tomorrow instead per blake's suggestion to have him come into PAC tomorrow AM instead.   Patient instructed to stop eating and drinking normal foods and switch to clear liquids at 6 AM tomorrow.    Person(s) involved in teaching:   Patient     Motivation Level:  Asks Questions: Yes-asked for clarification.  States he wanted information in very simple terms  Eager to Learn: Yes, but unsure if he was listening well.    Cooperative: No, explain: Patient was not very cooperative with timing of his PAC visit today and he refused it, stating that he has to get home and it's all very overwhelming for him today.    Receptive (willing/able to accept information): No, explain: After office visit he just wanted to leave to go home to call his  for his house arrest and not have to go to PAC, states it's too much for today.  States he is going to walk out of clinic and call to reschedule his PAC visit.    He states that he doesn't even have to really have surgery, it's his choice, He  "stated \"I could just live with my elbow like this\"    Any cultural factors/Yazidism beliefs that may influence understanding or compliance? Unsure.  Patient was more receptive over the phone about rescheduling his PAC for 9:30 AM tomorrow and having surgery tomorrow afternoon, although we won't have an exact time for him to arrive to surgery until after his PAC visit is complete.     Patient demonstrates understanding of the following:  Reason for the appointment, diagnosis and treatment plan: Yes    Knowledge of proper use of medications and conditions for which they are ordered (with special attention to potential side effects or drug interactions): No, explain: Unsure of patient capacity to understand    Which situations necessitate calling provider and whom to contact: Yes       Teaching Concerns Addressed:   Proper use and care of  (medical equip, care aids, etc.): Yes  Nutritional needs and diet plan: Yes  Pain management techniques: Yes  Wound Care: Yes  How and/when to access community resources: Yes     Instructional Materials Used/Given: Preoperative teaching packet and antibacterial soap.     Time spent with patient: 15 minutes. Paty Bobby RN      "

## 2019-11-04 NOTE — TELEPHONE ENCOUNTER
FUTURE VISIT INFORMATION      SURGERY INFORMATION:    needs PAC today per Paty in Ortho    RECORDS REQUESTED FROM:       Primary Care Provider: None    Most recent EKG+ [id number]

## 2019-11-04 NOTE — NURSING NOTE
"Chief Complaint   Patient presents with     Consult      1 week follow up -facial fracrure      Blood pressure 134/81, pulse 93, temperature 98.5  F (36.9  C), resp. rate 17, height 1.778 m (5' 10\"), weight 61.2 kg (135 lb), SpO2 100 %.    Derrick Blount LPN    "
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
## 2019-11-04 NOTE — NURSING NOTE
"Reason For Visit:   Chief Complaint   Patient presents with     Consult For     Right elbow fracture       Primary MD: No Ref-Primary, Physician    ?  No    Age: 24 year old    Occupation currently not working.  Currently working? No.  Work status?  Waiting to return to work.  Date of injury: 10/27/19  Date of surgery: No surgery  Smoker: Yes  Request smoking cessation information: No      /79   Pulse 72   Ht 1.778 m (5' 10\")   Wt 60.8 kg (134 lb)   BMI 19.23 kg/m        Pain Assessment  Patient Currently in Pain: Yes  0-10 Pain Scale: 5(5 on average, jumps up during motion)  Primary Pain Location: Elbow               QuickDASH Assessment  No flowsheet data found.       No Known Allergies    Ricki Byalijourdan, ATC      "
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
## 2019-11-05 ENCOUNTER — ANESTHESIA EVENT (OUTPATIENT)
Dept: SURGERY | Facility: AMBULATORY SURGERY CENTER | Age: 24
End: 2019-11-05

## 2019-11-05 ENCOUNTER — ANESTHESIA (OUTPATIENT)
Dept: SURGERY | Facility: AMBULATORY SURGERY CENTER | Age: 24
End: 2019-11-05

## 2019-11-05 ENCOUNTER — HOSPITAL ENCOUNTER (OUTPATIENT)
Facility: AMBULATORY SURGERY CENTER | Age: 24
End: 2019-11-05
Attending: ORTHOPAEDIC SURGERY
Payer: COMMERCIAL

## 2019-11-05 ENCOUNTER — OFFICE VISIT (OUTPATIENT)
Dept: SURGERY | Facility: CLINIC | Age: 24
End: 2019-11-05
Payer: COMMERCIAL

## 2019-11-05 ENCOUNTER — ANCILLARY PROCEDURE (OUTPATIENT)
Dept: RADIOLOGY | Facility: AMBULATORY SURGERY CENTER | Age: 24
End: 2019-11-05
Attending: ORTHOPAEDIC SURGERY
Payer: COMMERCIAL

## 2019-11-05 VITALS
HEART RATE: 85 BPM | SYSTOLIC BLOOD PRESSURE: 134 MMHG | WEIGHT: 141 LBS | BODY MASS INDEX: 20.88 KG/M2 | DIASTOLIC BLOOD PRESSURE: 76 MMHG | OXYGEN SATURATION: 99 % | RESPIRATION RATE: 16 BRPM | HEIGHT: 69 IN | TEMPERATURE: 98.9 F

## 2019-11-05 VITALS
SYSTOLIC BLOOD PRESSURE: 138 MMHG | HEART RATE: 82 BPM | RESPIRATION RATE: 16 BRPM | OXYGEN SATURATION: 98 % | TEMPERATURE: 98.4 F | WEIGHT: 135 LBS | DIASTOLIC BLOOD PRESSURE: 88 MMHG | HEIGHT: 68 IN | BODY MASS INDEX: 20.46 KG/M2

## 2019-11-05 DIAGNOSIS — R52 PAIN: ICD-10-CM

## 2019-11-05 DIAGNOSIS — S52.021A CLOSED FRACTURE OF RIGHT OLECRANON PROCESS, INITIAL ENCOUNTER: ICD-10-CM

## 2019-11-05 DIAGNOSIS — Z01.818 PREOP EXAMINATION: Primary | ICD-10-CM

## 2019-11-05 RX ORDER — MEPERIDINE HYDROCHLORIDE 25 MG/ML
12.5 INJECTION INTRAMUSCULAR; INTRAVENOUS; SUBCUTANEOUS
Status: DISCONTINUED | OUTPATIENT
Start: 2019-11-05 | End: 2019-11-06 | Stop reason: HOSPADM

## 2019-11-05 RX ORDER — KETOROLAC TROMETHAMINE 30 MG/ML
INJECTION, SOLUTION INTRAMUSCULAR; INTRAVENOUS PRN
Status: DISCONTINUED | OUTPATIENT
Start: 2019-11-05 | End: 2019-11-05

## 2019-11-05 RX ORDER — SODIUM CHLORIDE, SODIUM LACTATE, POTASSIUM CHLORIDE, CALCIUM CHLORIDE 600; 310; 30; 20 MG/100ML; MG/100ML; MG/100ML; MG/100ML
INJECTION, SOLUTION INTRAVENOUS CONTINUOUS
Status: DISCONTINUED | OUTPATIENT
Start: 2019-11-05 | End: 2019-11-05 | Stop reason: HOSPADM

## 2019-11-05 RX ORDER — ACETAMINOPHEN 325 MG/1
975 TABLET ORAL ONCE
Status: DISCONTINUED | OUTPATIENT
Start: 2019-11-05 | End: 2019-11-05 | Stop reason: HOSPADM

## 2019-11-05 RX ORDER — NALOXONE HYDROCHLORIDE 0.4 MG/ML
.1-.4 INJECTION, SOLUTION INTRAMUSCULAR; INTRAVENOUS; SUBCUTANEOUS
Status: DISCONTINUED | OUTPATIENT
Start: 2019-11-05 | End: 2019-11-06 | Stop reason: HOSPADM

## 2019-11-05 RX ORDER — LIDOCAINE 40 MG/G
CREAM TOPICAL
Status: DISCONTINUED | OUTPATIENT
Start: 2019-11-05 | End: 2019-11-05 | Stop reason: HOSPADM

## 2019-11-05 RX ORDER — PROPOFOL 10 MG/ML
INJECTION, EMULSION INTRAVENOUS PRN
Status: DISCONTINUED | OUTPATIENT
Start: 2019-11-05 | End: 2019-11-05

## 2019-11-05 RX ORDER — FLUMAZENIL 0.1 MG/ML
0.2 INJECTION, SOLUTION INTRAVENOUS
Status: DISCONTINUED | OUTPATIENT
Start: 2019-11-05 | End: 2019-11-05 | Stop reason: HOSPADM

## 2019-11-05 RX ORDER — OXYCODONE HYDROCHLORIDE 5 MG/1
5 TABLET ORAL EVERY 4 HOURS PRN
Status: DISCONTINUED | OUTPATIENT
Start: 2019-11-05 | End: 2019-11-06 | Stop reason: HOSPADM

## 2019-11-05 RX ORDER — BUPIVACAINE HYDROCHLORIDE 5 MG/ML
INJECTION, SOLUTION EPIDURAL; INTRACAUDAL PRN
Status: DISCONTINUED | OUTPATIENT
Start: 2019-11-05 | End: 2019-11-05

## 2019-11-05 RX ORDER — FENTANYL CITRATE 50 UG/ML
25-50 INJECTION, SOLUTION INTRAMUSCULAR; INTRAVENOUS
Status: DISCONTINUED | OUTPATIENT
Start: 2019-11-05 | End: 2019-11-05 | Stop reason: HOSPADM

## 2019-11-05 RX ORDER — ONDANSETRON 4 MG/1
4 TABLET, ORALLY DISINTEGRATING ORAL EVERY 30 MIN PRN
Status: DISCONTINUED | OUTPATIENT
Start: 2019-11-05 | End: 2019-11-06 | Stop reason: HOSPADM

## 2019-11-05 RX ORDER — OXYCODONE HYDROCHLORIDE 5 MG/1
5 TABLET ORAL
Status: DISCONTINUED | OUTPATIENT
Start: 2019-11-05 | End: 2019-11-06 | Stop reason: HOSPADM

## 2019-11-05 RX ORDER — LIDOCAINE HYDROCHLORIDE 20 MG/ML
INJECTION, SOLUTION INFILTRATION; PERINEURAL PRN
Status: DISCONTINUED | OUTPATIENT
Start: 2019-11-05 | End: 2019-11-05

## 2019-11-05 RX ORDER — CEFAZOLIN SODIUM 1 G/50ML
1 SOLUTION INTRAVENOUS SEE ADMIN INSTRUCTIONS
Status: DISCONTINUED | OUTPATIENT
Start: 2019-11-05 | End: 2019-11-05 | Stop reason: HOSPADM

## 2019-11-05 RX ORDER — CEFAZOLIN SODIUM 1 G/3ML
INJECTION, POWDER, FOR SOLUTION INTRAMUSCULAR; INTRAVENOUS PRN
Status: DISCONTINUED | OUTPATIENT
Start: 2019-11-05 | End: 2019-11-05

## 2019-11-05 RX ORDER — ACETAMINOPHEN 325 MG/1
650 TABLET ORAL EVERY 4 HOURS PRN
Qty: 50 TABLET | Refills: 0 | Status: ON HOLD | OUTPATIENT
Start: 2019-11-05 | End: 2024-07-25

## 2019-11-05 RX ORDER — OXYCODONE HYDROCHLORIDE 5 MG/1
5 TABLET ORAL EVERY 6 HOURS PRN
Qty: 25 TABLET | Refills: 0 | Status: SHIPPED | OUTPATIENT
Start: 2019-11-05 | End: 2019-11-05

## 2019-11-05 RX ORDER — OXYCODONE HYDROCHLORIDE 5 MG/1
5 TABLET ORAL EVERY 6 HOURS PRN
Qty: 25 TABLET | Refills: 0 | Status: SHIPPED | OUTPATIENT
Start: 2019-11-05 | End: 2019-11-08

## 2019-11-05 RX ORDER — NALOXONE HYDROCHLORIDE 0.4 MG/ML
.1-.4 INJECTION, SOLUTION INTRAMUSCULAR; INTRAVENOUS; SUBCUTANEOUS
Status: DISCONTINUED | OUTPATIENT
Start: 2019-11-05 | End: 2019-11-05 | Stop reason: HOSPADM

## 2019-11-05 RX ORDER — PROPOFOL 10 MG/ML
INJECTION, EMULSION INTRAVENOUS CONTINUOUS PRN
Status: DISCONTINUED | OUTPATIENT
Start: 2019-11-05 | End: 2019-11-05

## 2019-11-05 RX ORDER — ONDANSETRON 2 MG/ML
4 INJECTION INTRAMUSCULAR; INTRAVENOUS EVERY 30 MIN PRN
Status: DISCONTINUED | OUTPATIENT
Start: 2019-11-05 | End: 2019-11-06 | Stop reason: HOSPADM

## 2019-11-05 RX ORDER — SODIUM CHLORIDE, SODIUM LACTATE, POTASSIUM CHLORIDE, CALCIUM CHLORIDE 600; 310; 30; 20 MG/100ML; MG/100ML; MG/100ML; MG/100ML
INJECTION, SOLUTION INTRAVENOUS CONTINUOUS
Status: DISCONTINUED | OUTPATIENT
Start: 2019-11-05 | End: 2019-11-06 | Stop reason: HOSPADM

## 2019-11-05 RX ORDER — CEFAZOLIN SODIUM 2 G/50ML
2 SOLUTION INTRAVENOUS
Status: COMPLETED | OUTPATIENT
Start: 2019-11-05 | End: 2019-11-05

## 2019-11-05 RX ORDER — GABAPENTIN 300 MG/1
300 CAPSULE ORAL ONCE
Status: COMPLETED | OUTPATIENT
Start: 2019-11-05 | End: 2019-11-05

## 2019-11-05 RX ADMIN — CEFAZOLIN SODIUM 1 G: 1 INJECTION, POWDER, FOR SOLUTION INTRAMUSCULAR; INTRAVENOUS at 15:33

## 2019-11-05 RX ADMIN — FENTANYL CITRATE 50 MCG: 50 INJECTION, SOLUTION INTRAMUSCULAR; INTRAVENOUS at 12:19

## 2019-11-05 RX ADMIN — GABAPENTIN 300 MG: 300 CAPSULE ORAL at 11:55

## 2019-11-05 RX ADMIN — SODIUM CHLORIDE, SODIUM LACTATE, POTASSIUM CHLORIDE, CALCIUM CHLORIDE: 600; 310; 30; 20 INJECTION, SOLUTION INTRAVENOUS at 11:55

## 2019-11-05 RX ADMIN — PROPOFOL 50 MG: 10 INJECTION, EMULSION INTRAVENOUS at 15:39

## 2019-11-05 RX ADMIN — BUPIVACAINE HYDROCHLORIDE 20 ML: 5 INJECTION, SOLUTION EPIDURAL; INTRACAUDAL at 12:21

## 2019-11-05 RX ADMIN — PROPOFOL 50 MG: 10 INJECTION, EMULSION INTRAVENOUS at 14:39

## 2019-11-05 RX ADMIN — PROPOFOL 30 MG: 10 INJECTION, EMULSION INTRAVENOUS at 13:30

## 2019-11-05 RX ADMIN — CEFAZOLIN SODIUM 2 G: 2 SOLUTION INTRAVENOUS at 13:35

## 2019-11-05 RX ADMIN — SODIUM CHLORIDE, SODIUM LACTATE, POTASSIUM CHLORIDE, CALCIUM CHLORIDE: 600; 310; 30; 20 INJECTION, SOLUTION INTRAVENOUS at 13:17

## 2019-11-05 RX ADMIN — LIDOCAINE HYDROCHLORIDE 100 MG: 20 INJECTION, SOLUTION INFILTRATION; PERINEURAL at 13:25

## 2019-11-05 RX ADMIN — PROPOFOL 50 MG: 10 INJECTION, EMULSION INTRAVENOUS at 14:43

## 2019-11-05 RX ADMIN — PROPOFOL 30 MG: 10 INJECTION, EMULSION INTRAVENOUS at 14:16

## 2019-11-05 RX ADMIN — PROPOFOL 60 MG: 10 INJECTION, EMULSION INTRAVENOUS at 15:36

## 2019-11-05 RX ADMIN — PROPOFOL 50 MG: 10 INJECTION, EMULSION INTRAVENOUS at 15:34

## 2019-11-05 RX ADMIN — PROPOFOL 100 MCG/KG/MIN: 10 INJECTION, EMULSION INTRAVENOUS at 13:30

## 2019-11-05 RX ADMIN — PROPOFOL 50 MG: 10 INJECTION, EMULSION INTRAVENOUS at 14:55

## 2019-11-05 RX ADMIN — PROPOFOL 50 MG: 10 INJECTION, EMULSION INTRAVENOUS at 14:48

## 2019-11-05 RX ADMIN — KETOROLAC TROMETHAMINE 30 MG: 30 INJECTION, SOLUTION INTRAMUSCULAR; INTRAVENOUS at 15:29

## 2019-11-05 RX ADMIN — PROPOFOL 50 MG: 10 INJECTION, EMULSION INTRAVENOUS at 14:53

## 2019-11-05 ASSESSMENT — MIFFLIN-ST. JEOR
SCORE: 1628.33
SCORE: 1586.86

## 2019-11-05 ASSESSMENT — LIFESTYLE VARIABLES: TOBACCO_USE: 1

## 2019-11-05 ASSESSMENT — PAIN SCALES - GENERAL: PAINLEVEL: MODERATE PAIN (5)
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
## 2019-11-05 NOTE — ANESTHESIA PROCEDURE NOTES
Peripheral Nerve Block Procedure Note    Staff:     Anesthesiologist:  Shen Cruz MD    Resident/CRNA:  Philippe Pemberton MD    Block performed by resident/CRNA in the presence of a teaching physician    Location: Pre-op  Procedure Start/Stop TImes:      11/5/2019 12:18 PM     11/5/2019 12:23 PM    patient identified, IV checked, site marked, risks and benefits discussed, informed consent, monitors and equipment checked, pre-op evaluation, at physician/surgeon's request and post-op pain management      Correct Patient: Yes      Correct Position: Yes      Correct Site: Yes      Correct Procedure: Yes      Correct Laterality:  Yes    Site Marked:  Yes  Procedure details:     Procedure:  Supraclavicular    Laterality:  Right    Position:  Supine    Sterile Prep: chloraprep, mask and sterile gloves      Needle:  Short bevel    Needle gauge:  21    Needle length (mm):  100    Ultrasound: Yes      Ultrasound used to identify targeted nerve, plexus, or vascular structure and placed a needle adjacent to it      Permanent Image entered into patiient's record      Abnormal pain on injection: No      Blood Aspirated: No      Paresthesias:  No    Bleeding at site: No      Test dose negative for signs of intravascular injection: Yes      Bolus via:  Needle    Infusion Method:  Single Shot    Blood aspirated via catheter: No      Complications:  None  Assessment/Narrative:     Injection made incrementally with aspirations every (mL):  5     133 mg exparel given supraclavicular    Discussed with Patient Off-Label use of Liposomal Bupivacaine (Exparel) for Nerve Block.    Relevant risks & benefits were discussed with patient.    All questions were answered and there was agreement to proceed.    Patient signed Off-Label Use of Exparel Consent Form.
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
## 2019-11-05 NOTE — ANESTHESIA POSTPROCEDURE EVALUATION
Anesthesia POST Procedure Evaluation    Patient: Natalio Rodas   MRN:     [id number] Gender:   male   Age:    24 year old :      1995        Preoperative Diagnosis: Closed fracture of right olecranon process, initial encounter [S52.021A]   Procedure(s):  OPEN REDUCTION INTERNAL FIXATION right olecranon fracture   Postop Comments: No value filed.       Anesthesia Type:  Not documented  General    Reportable Event: NO     PAIN: Uncomplicated   Sign Out status: Comfortable, Well controlled pain     PONV: No PONV   Sign Out status:  No Nausea or Vomiting     Neuro/Psych: Uneventful perioperative course   Sign Out Status: Preoperative baseline     Airway/Resp.: Uneventful perioperative course   Sign Out Status: Non labored breathing, age appropriate RR; Resp. Status within EXPECTED Parameters     CV: Uneventful perioperative course   Sign Out status: Appropriate BP and perfusion indices; Appropriate HR/Rhythm     Disposition:   Sign Out in:  Phase II  Disposition:  Home  Recovery Course: Uneventful  Follow-Up: Not required     Comments/Narrative:  Patient with some emergence agitation related to positioning on OR table which patient did not prefer and required propofol for casting of arm. He had similar episode mid surgery when sedation was decreased to assess mental status intra-op.  available to ensure safety of OR staff during emergence from anesthesia. In Phase II patient returned to baseline mental status quickly. Denied major complaints of anesthesia. Patient was instructed to follow up with  once he arrived at home. Patient was discharged comfortable and without incident via wheelchair and .           Last Anesthesia Record Vitals:  CRNA VITALS  2019 1518 - 2019 1618      2019             NIBP:  117/67    Ht Rate:  86          Last PACU Vitals:  Vitals Value Taken Time   BP     Temp     Pulse     Resp     SpO2     Temp src     NIBP     Pulse  86 11/5/2019  3:46 PM   SpO2 100 % 11/5/2019  3:46 PM   Resp     Temp     Ht Rate     Temp 2           Electronically Signed By: Ezio Echavarria MD, November 5, 2019, 4:40 PM

## 2019-11-05 NOTE — BRIEF OP NOTE
[facility]    Brief Operative Note    Pre-operative diagnosis: Closed fracture of right olecranon process, initial encounter [S52.021A]  Post-operative diagnosis Same    Procedure(s):  OPEN REDUCTION INTERNAL FIXATION right olecranon fracture  Surgeon(s) and Role:     * Vahid Goncalves MD - Primary     * Philippe Palacio MD, Resident - Assistin    Anesthesia: MAC with Regional   Tourniquet: 90 minutes  Estimated blood loss: Approximately 30 cc  Drains: None  Specimens: * No specimens in log *  Findings:  See operative report  Complications: None apparent  Implants:   Implant Name Type Inv. Item Serial No.  Lot No. LRB No. Used   0.062&quot; x 6&quot; Guide Wire    [employer]  Right 3   Extended 5-Hole Olecranon Plate, Right     [employer]  Right 1   Acumed 3.5mm non-locking screw 18mm    [employer]  Right 2   Acumed 3.5mm non-locking screw 22mm    [employer]  Right 1   Acumed 3.5mm locking screw 10mm    [employer]  Right 1   Acumed 3.5mm locking screw 10mm    [employer]  Right 1   acumed 3.5 locking screw 16mm    [employer]  Right 1   Acumed 3.5 locking screw 50mm    [employer]  Right 1     Postoperative Plan:  - Pain: As prescribed  - Activity: NWB surgical extremity  - Immobilization: Splint  - Wound care: Keep dressings clean, dry and intact  - DVT ppx: None  - Follow up: As scheduled  - Disposition: [facility] home per PACU standards    Philippe Palacio MD, PhD  Orthopedic Surgery Resident  [facility]

## 2019-11-05 NOTE — PATIENT INSTRUCTIONS
Preparing for Your Surgery      Name:  Natalio Rodas   MRN:  [id number]   :  1995   Today's Date:  2019     Arriving for surgery:  Surgery date:  19  Arrival time:  11:30AM  Please come to:     [facility]  [address] [place] [id number]     Parking is available in front of the Clinics and Surgery Center building from 5:30AM to 8:00PM.  -  Proceed to the 5th floor to check into the Ambulatory Surgery Center.              >> There will be patient concierges on the 1st and 5th floor, for assistance or an escort, if you would like.              >> Please call [phone] with any questions.    What can I eat or drink?  -  You may have solid food or milk products until 8 hours prior to your surgery. 19, 5AM  -  You may have water, apple juice or 7up/Sprite until 2 hours prior to your surgery. 19, 11AM    Which medicines can I take?  Stop Aspirin, vitamins and supplements one week prior to surgery.  Hold Ibuprofen for 24 hours and/or Naproxen for 48 hours prior to surgery.     -  Please take these medications the day of surgery:    Lithium    Acetaminophen as needed    Artificial tears as needed    How do I prepare myself?  -  Take two showers: one the night before surgery; and one the morning of surgery.         Use Scrubcare or Hibiclens to wash from neck down, leave soap on your skin for up to one minute.  Do not get soap in your eyes or ears.  You may use your own shampoo and conditioner; no other hair products.   -  Do NOT use lotion, powder, deodorant, or antiperspirant the day of your surgery.  -  Do NOT wear jewelry.  - Do not bring your own medications to the hospital, except for inhalers and eye   drops.  -  Bring your ID and insurance card.    -If you are scheduled to go home the Same Day as surgery you must have a responsible adult as a  and to stay with you overnight the first 24 hours after surgery.     Questions or  Concerns:  -If you are scheduled at the Ambulatory Surgery Center and have questions or concerns regarding the day of surgery please call [phone].    -For questions after surgery please call your surgeons office.

## 2019-11-05 NOTE — DISCHARGE INSTRUCTIONS
Instructions for after your surgery    Leave your splint on and keep it dry. Do not remove it or get it wet.     Keep your operative arm elevated as much as possible. This will help with pain and swelling.     Do not use your operative arm for any lifting, pushing, pulling, weight bearing, or other activities.     Wear a sling as needed for comfort. If you choose to wear a sling, be sure to take it off and range your shoulder and elbow (if not included in the splint) a few times a day so they do not get stiff. (If you have received a regional block, wear the sling at all times until the block wears off.)     Depending on your surgery, you will have a follow-up appointment scheduled with Dr. Goncalves or an /nurse. If you do not know when this appointment is, please call Dr. Goncalves's office to find out.     Call Dr. Goncalves's office if you experience any of the following:   Fevers, chills, increasing wound drainage, pain that is not controlled with the medications you have been prescribing, swelling that is not controlled with elevation, problems with your splint, or any other questions or concerns.     [facility]  Home Care Following Anesthesia  For 24 hours after surgery:  1. Get plenty of rest.  A responsible adult must stay with you for at least 24 hours after you leave the surgery center.  2. Do not drive or use heavy equipment.  If you have weakness or tingling, don't drive or use heavy equipment until this feeling goes away.   3. Do not drink alcohol.   4. Avoid strenuous or risky activities.  Ask for help when climbing stairs.  5. You may feel lightheaded.  IF so, sit for a few minutes before standing.  Have someone help you get up.   6. If you have nausea (feel sick to your stomach): Drink only clear liquids such as apple juice, ginger ale, broth or 7-Up.  Rest may also help.  Be sure to drink enough fluids.  Move to a regular diet as you feel able.    7. You may have a slight fever.  Call the doctor if your fever is over 100 F (37.7 C) (taken under the tongue) or lasts longer than 24 hours.  8. You may have a dry mouth, a sore throat, muscle aches or trouble sleeping. These should go away after 24 hours.  9. Do not make important or legal decisions.       Tips for taking pain medications  To get the best pain relief possible, remember these points:    Take pain medications as directed, before pain becomes severe.    Pain medication can upset your stomach: taking it with food may help.    Constipation is a common side effect of pain medication. Drink plenty of  fluids.    Eat foods high in fiber. Take a stool softener if recommended by your doctor or pharmacist.    Do not drink alcohol, drive or operate machinery while taking pain medications.    Ask about other ways to control pain, such as with heat, ice or relaxation.    Tylenol/Acetaminophen Consumption  To help encourage the safe use of acetaminophen, the makers of TYLENOL  have lowered the maximum daily dose for single-ingredient Extra Strength TYLENOL  (acetaminophen) products sold in the [place] from 8 pills per day (4,000 mg) to 6 pills per day (3,000 mg). The dosing interval has also changed from 2 pills every 4-6 hours to 2 pills every 6 hours.    If you feel your pain relief is insufficient, you may take Tylenol/Acetaminophen in addition to your narcotic pain medication.     Be careful not to exceed 3,000 mg of Tylenol/Acetaminophen in a 24 hour period from all sources.    If you are taking extra strength Tylenol/acetaminophen (500 mg), the maximum dose is 6 tablets in 24 hours.    If you are taking regular strength acetaminophen (325 mg), the maximum dose is 9 tablets in 24 hours.      Call a doctor for any of the followin. Signs of infection (fever, growing tenderness at the surgery site, a large amount of drainage or bleeding, severe pain, foul-smelling drainage, redness, swelling).  2. It has  been over 8 to 10 hours since surgery and you are still not able to urinate (pass water).  3. Headache for over 24 hours.  Your doctor is:       Dr. Vahid Goncalves, Orthopaedics: [phone]               Or dial [phone] and ask for the resident on call for:  Orthopaedics  For emergency care, call the:  [facility] Emergency Department: [phone] (TTY for hearing impaired: [phone])

## 2019-11-05 NOTE — ANESTHESIA CARE TRANSFER NOTE
Patient: Natalio Rodas    Procedure(s):  OPEN REDUCTION INTERNAL FIXATION right olecranon fracture    Diagnosis: Closed fracture of right olecranon process, initial encounter [S52.021A]  Diagnosis Additional Information: No value filed.    Anesthesia Type:   General     Note:  Airway :Room Air  Patient transferred to:Phase II  Comments: Arrive Phase II, Stable, Airway Intact  117/67, 85,20,98%  All questions answered.Handoff Report: Identifed the Patient, Identified the Reponsible Provider, Reviewed the pertinent medical history, Discussed the surgical course, Reviewed Intra-OP anesthesia mangement and issues during anesthesia, Set expectations for post-procedure period and Allowed opportunity for questions and acknowledgement of understanding      Vitals: (Last set prior to Anesthesia Care Transfer)    CRNA VITALS  11/5/2019 1518 - 11/5/2019 1558      11/5/2019             Resp Rate (set):  10                Electronically Signed By: TAMARA Forrester CRNA  November 5, 2019  3:58 PM
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
## 2019-11-07 ENCOUNTER — PRE VISIT (OUTPATIENT)
Dept: NEUROSURGERY | Facility: CLINIC | Age: 24
End: 2019-11-07

## 2019-11-07 NOTE — TELEPHONE ENCOUNTER
FUTURE VISIT INFORMATION      FUTURE VISIT INFORMATION:    Date: 11/11/2019    Time: 3pm    Location: [facility]  REFERRAL INFORMATION:    Referring provider:  Dr. Lee    Referring providers clinic:   Emergency Dept.     Reason for visit/diagnosis  TBI    RECORDS REQUESTED FROM:       Clinic name Comments Records Status Imaging Status   [facility]  Care Everywhere N/A

## 2019-11-12 DIAGNOSIS — S52.021A CLOSED FRACTURE OF RIGHT OLECRANON PROCESS, INITIAL ENCOUNTER: Primary | ICD-10-CM

## 2019-11-12 DIAGNOSIS — S06.4XAA EPIDURAL HEMATOMA (H): Primary | ICD-10-CM

## 2019-11-13 ENCOUNTER — THERAPY VISIT (OUTPATIENT)
Dept: OCCUPATIONAL THERAPY | Facility: CLINIC | Age: 24
End: 2019-11-13
Payer: COMMERCIAL

## 2019-11-13 ENCOUNTER — ANCILLARY PROCEDURE (OUTPATIENT)
Dept: GENERAL RADIOLOGY | Facility: CLINIC | Age: 24
End: 2019-11-13
Attending: ORTHOPAEDIC SURGERY
Payer: COMMERCIAL

## 2019-11-13 ENCOUNTER — OFFICE VISIT (OUTPATIENT)
Dept: ORTHOPEDICS | Facility: CLINIC | Age: 24
End: 2019-11-13
Payer: COMMERCIAL

## 2019-11-13 DIAGNOSIS — S52.021A CLOSED FRACTURE OF RIGHT OLECRANON PROCESS, INITIAL ENCOUNTER: ICD-10-CM

## 2019-11-13 DIAGNOSIS — Z47.89 AFTERCARE FOLLOWING SURGERY OF THE MUSCULOSKELETAL SYSTEM: ICD-10-CM

## 2019-11-13 DIAGNOSIS — M25.621 STIFFNESS OF ELBOW JOINT, RIGHT: ICD-10-CM

## 2019-11-13 DIAGNOSIS — S52.021A CLOSED FRACTURE OF RIGHT OLECRANON PROCESS, INITIAL ENCOUNTER: Primary | ICD-10-CM

## 2019-11-13 DIAGNOSIS — M25.521 RIGHT ELBOW PAIN: Primary | ICD-10-CM

## 2019-11-13 PROCEDURE — 97165 OT EVAL LOW COMPLEX 30 MIN: CPT | Mod: GO | Performed by: OCCUPATIONAL THERAPIST

## 2019-11-13 PROCEDURE — 97760 ORTHOTIC MGMT&TRAING 1ST ENC: CPT | Mod: GO | Performed by: OCCUPATIONAL THERAPIST

## 2019-11-13 PROCEDURE — 97110 THERAPEUTIC EXERCISES: CPT | Mod: GO | Performed by: OCCUPATIONAL THERAPIST

## 2019-11-13 NOTE — NURSING NOTE
Reason For Visit:   Chief Complaint   Patient presents with     RECHECK     DOS 11/5/19 Open Reduction Internal Fixation R Olecranon Fracture       Primary MD: No Ref-Primary, Physician    Age: 24 year old    ?  No    There were no vitals taken for this visit.    Pain Assessment  Patient Currently in Pain: Denies    QuickDASH Assessment  No flowsheet data found.       Current Outpatient Medications   Medication Sig Dispense Refill     acetaminophen (TYLENOL) 325 MG tablet Take 2 tablets (650 mg) by mouth every 4 hours as needed for mild pain 50 tablet 0     acetaminophen (TYLENOL) 500 MG tablet Take 2 tablets (1,000 mg) by mouth 3 times daily (Patient taking differently: Take 1,000 mg by mouth 3 times daily ) 60 tablet 0     erythromycin (ROMYCIN) 5 MG/GM ophthalmic ointment Place Into the left eye At Bedtime (Patient taking differently: Place 0.5 inches Into the left eye At Bedtime ) 3.5 g 0     hypromellose-dextran (ARTIFICAL TEARS) 0.1-0.3 % ophthalmic solution Place 1 drop Into the left eye 3 times daily 30 mL 0     lithium ER (LITHOBID/ESKALITH CR) 300 MG CR tablet Take 1 tab (300 mg) by mouth every morning and take 2 tabs (600 mg) by mouth at bedtime. (Patient taking differently: Take 300 mg by mouth 2 times daily Take 1 tab (300 mg) by mouth every morning and take 2 tabs (600 mg) by mouth at bedtime. ) 90 tablet 0       Allergies   Allergen Reactions     Nsaids      PT CAN NOT TAKE WITH LITHIUM        Aliya Anderson ATC
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
## 2019-11-13 NOTE — LETTER
2019       RE: Natalio Rodas  [address]  [facility] [address]     Dear Colleague,    Thank you for referring your patient, Natalio oRdas, to the [facility] ORTH[facility] at [facility]. Please see a copy of my visit note below.    [facility]  Orthopedics  Vahid Goncalves MD  2019     Name: Natalio Rodas  MRN: [id number]  Age: 24 year old  : 1995  Referring provider: Vahid Goncalves     Chief Complaint: Follow-up 1 week status post right ulna open reduction internal fixation    Date of Surgery: 19    Procedure: Open reduction internal fixation right olecranon fracture.    History of Present Illness:   Natalio Rodas is a 24 year old male 1 week status-post the above procedure who presents for postoperative evaluation.  Today he reports that he has been doing well.  He denies pain, numbness, tingling, fever or chills.  He has been wearing a splint but admits to have carried a 45 pound television.  Fortunately he did not hear any pop or had any pain after the incident.  He returns today for routine follow-up and does not have any specific questions or concerns.    Physical Examination:  There were no vitals taken for this visit.  General: Healthy appearing male. Alert and oriented to surroundings.   Right Upper Extremity: Incision healing well with Steri-Strips intact.  Serosanguineous staining of dressings but without active drainage.  No significant erythema.  No obvious tenderness to palpation around the incision.  Sensation intact in the median ulnar radial nerve distributions.  Fires EPL, FPL, intrinsics with 5 out of 5 strength.  Radial pulses palpable in all fingers are warm well-perfused.  Able to gently range the elbow with pronation, supination, flexion and extension with minimal pain but stiffness due to immobilization.      Radiographs:   3 view x-rays of the right elbow obtained on 2019 were  reviewed.  Proximal ulnar fracture is in good alignment.  Plate and screws are intact without complication.  The ulnohumeral joint is located.    Assessment:   24 year old male who presents for routine follow-up 1 week status post open reduction internal fixation of a right olecranon fracture.  Fracture remains well aligned and the patient is doing well postoperatively althuogh nonadherent to activity restrictions.    Plan:   -Occupational therapy today to start rehabilitation and for a custom removable brace  -Okay to shower with splint covered only- must remain on except for exercise   -No active elbow extensino or passive flexion  -No elbow flexion > 90  -Reinforced NWB>weight of coffee cup  -Weightbearing limited to a cup of coffee  -Follow-up in 5 weeks with repeat x-rays of the right elbow 3 views    This patient was seen and discussed with Dr. Goncalves who agrees with the above.    Philippe Palacio MD, PhD  Orthopedic Surgery Resident  [facility]    I, Vahid Goncalves, saw and evaluated this patient with the resident and agree with the resident s findings and plan of care as documented in the resident s note.     Vahid Goncalves MD

## 2019-11-13 NOTE — PROGRESS NOTES
[facility]  Orthopedics  Vahid Goncalves MD  2019     Name: Natalio Rodas  MRN: [id number]  Age: 24 year old  : 1995  Referring provider: Vahid Goncalves     Chief Complaint: Follow-up 1 week status post right ulna open reduction internal fixation    Date of Surgery: 19    Procedure: Open reduction internal fixation right olecranon fracture.    History of Present Illness:   Natalio Rodas is a 24 year old male 1 week status-post the above procedure who presents for postoperative evaluation.  Today he reports that he has been doing well.  He denies pain, numbness, tingling, fever or chills.  He has been wearing a splint but admits to have carried a 45 pound television.  Fortunately he did not hear any pop or had any pain after the incident.  He returns today for routine follow-up and does not have any specific questions or concerns.    Physical Examination:  There were no vitals taken for this visit.  General: Healthy appearing male. Alert and oriented to surroundings.   Right Upper Extremity: Incision healing well with Steri-Strips intact.  Serosanguineous staining of dressings but without active drainage.  No significant erythema.  No obvious tenderness to palpation around the incision.  Sensation intact in the median ulnar radial nerve distributions.  Fires EPL, FPL, intrinsics with 5 out of 5 strength.  Radial pulses palpable in all fingers are warm well-perfused.  Able to gently range the elbow with pronation, supination, flexion and extension with minimal pain but stiffness due to immobilization.      Radiographs:   3 view x-rays of the right elbow obtained on 2019 were reviewed.  Proximal ulnar fracture is in good alignment.  Plate and screws are intact without complication.  The ulnohumeral joint is located.    Assessment:   24 year old male who presents for routine follow-up 1 week status post open reduction internal fixation of a right olecranon fracture.  Fracture  remains well aligned and the patient is doing well postoperatively althuogh nonadherent to activity restrictions.    Plan:   -Occupational therapy today to start rehabilitation and for a custom removable brace  -Okay to shower with splint covered only- must remain on except for exercise   -No active elbow extensino or passive flexion  -No elbow flexion > 90  -Reinforced NWB>weight of coffee cup  -Weightbearing limited to a cup of coffee  -Follow-up in 5 weeks with repeat x-rays of the right elbow 3 views    This patient was seen and discussed with Dr. Goncalves who agrees with the above.    Philippe Palacio MD, PhD  Orthopedic Surgery Resident  [facility]    I, Vahid Goncalves, saw and evaluated this patient with the resident and agree with the resident s findings and plan of care as documented in the resident s note.

## 2019-12-12 DIAGNOSIS — S52.021A CLOSED FRACTURE OF RIGHT OLECRANON PROCESS, INITIAL ENCOUNTER: Primary | ICD-10-CM

## 2019-12-16 ENCOUNTER — MEDICAL CORRESPONDENCE (OUTPATIENT)
Dept: HEALTH INFORMATION MANAGEMENT | Facility: CLINIC | Age: 24
End: 2019-12-16

## 2020-03-06 ENCOUNTER — MEDICAL CORRESPONDENCE (OUTPATIENT)
Dept: HEALTH INFORMATION MANAGEMENT | Facility: CLINIC | Age: 25
End: 2020-03-06

## 2020-04-01 PROBLEM — Z47.89 AFTERCARE FOLLOWING SURGERY OF THE MUSCULOSKELETAL SYSTEM: Status: RESOLVED | Noted: 2019-11-13 | Resolved: 2020-04-01

## 2020-04-01 PROBLEM — M25.521 RIGHT ELBOW PAIN: Status: RESOLVED | Noted: 2019-11-13 | Resolved: 2020-04-01

## 2020-04-01 PROBLEM — M25.621 STIFFNESS OF ELBOW JOINT, RIGHT: Status: RESOLVED | Noted: 2019-11-13 | Resolved: 2020-04-01

## 2020-04-01 PROBLEM — S52.021A CLOSED FRACTURE OF RIGHT OLECRANON PROCESS, INITIAL ENCOUNTER: Status: RESOLVED | Noted: 2019-11-04 | Resolved: 2020-04-01

## 2021-02-05 NOTE — ADDENDUM NOTE
Encounter addended by: Toshia Richard PsyD on: 10/8/2019 3:20 PM   Actions taken: Visit Navigator Flowsheet section accepted, Flowsheet accepted, Clinical Note Signed, Charge Capture section accepted no

## 2022-01-10 ENCOUNTER — OFFICE VISIT (OUTPATIENT)
Dept: URGENT CARE | Facility: URGENT CARE | Age: 27
End: 2022-01-10
Payer: COMMERCIAL

## 2022-01-10 VITALS
RESPIRATION RATE: 18 BRPM | DIASTOLIC BLOOD PRESSURE: 77 MMHG | SYSTOLIC BLOOD PRESSURE: 117 MMHG | OXYGEN SATURATION: 99 % | HEART RATE: 87 BPM | TEMPERATURE: 98.9 F | WEIGHT: 145 LBS | BODY MASS INDEX: 22.05 KG/M2

## 2022-01-10 DIAGNOSIS — R05.9 COUGH: ICD-10-CM

## 2022-01-10 DIAGNOSIS — J02.0 STREP THROAT: ICD-10-CM

## 2022-01-10 DIAGNOSIS — Z20.822 SUSPECTED COVID-19 VIRUS INFECTION: Primary | ICD-10-CM

## 2022-01-10 LAB
DEPRECATED S PYO AG THROAT QL EIA: POSITIVE
FLUAV AG SPEC QL IA: NEGATIVE
FLUBV AG SPEC QL IA: NEGATIVE

## 2022-01-10 PROCEDURE — 87804 INFLUENZA ASSAY W/OPTIC: CPT | Performed by: FAMILY MEDICINE

## 2022-01-10 PROCEDURE — 99203 OFFICE O/P NEW LOW 30 MIN: CPT | Performed by: FAMILY MEDICINE

## 2022-01-10 PROCEDURE — U0003 INFECTIOUS AGENT DETECTION BY NUCLEIC ACID (DNA OR RNA); SEVERE ACUTE RESPIRATORY SYNDROME CORONAVIRUS 2 (SARS-COV-2) (CORONAVIRUS DISEASE [COVID-19]), AMPLIFIED PROBE TECHNIQUE, MAKING USE OF HIGH THROUGHPUT TECHNOLOGIES AS DESCRIBED BY CMS-2020-01-R: HCPCS | Performed by: FAMILY MEDICINE

## 2022-01-10 PROCEDURE — 87880 STREP A ASSAY W/OPTIC: CPT | Performed by: FAMILY MEDICINE

## 2022-01-10 PROCEDURE — U0005 INFEC AGEN DETEC AMPLI PROBE: HCPCS | Performed by: FAMILY MEDICINE

## 2022-01-10 RX ORDER — AMOXICILLIN 500 MG/1
500 CAPSULE ORAL 2 TIMES DAILY
Qty: 20 CAPSULE | Refills: 0 | Status: SHIPPED | OUTPATIENT
Start: 2022-01-10 | End: 2022-01-20

## 2022-01-10 NOTE — LETTER
[facility] [place]  [address] [id number] [phone]      January 10, 2022    RE:  Natalio BURRELL Clark                                                                                                                                                       [address]  [facility] [address]            To whom it may concern:    Natalio Rodas is under my professional care for M<edical.   He  may return to work with the following: No working or lifting restrictions on or about when COVID test negative.          Sincerely,        Ricki Diaz DO    [facility]

## 2022-01-11 LAB — SARS-COV-2 RNA RESP QL NAA+PROBE: POSITIVE

## 2022-01-11 NOTE — PROGRESS NOTES
SUBJECTIVE: Natalio Rodas is a 26 year old male presenting with a chief complaint of sore throat.  Onset of symptoms was 1 week(s) ago.    Past Medical History:   Diagnosis Date     ADHD      Concussion      Oppositional defiant disorder      Paranoid schizophrenia (H)      Schizoaffective disorder, bipolar type (H)      Schizophrenia (H)      Allergies   Allergen Reactions     Nsaids      PT CAN NOT TAKE WITH LITHIUM      Social History     Tobacco Use     Smoking status: Former Smoker     Packs/day: 0.25     Years: 9.00     Pack years: 2.25     Types: Cigarettes     Start date: 10/27/2010     Quit date: 2019     Years since quittin.5     Smokeless tobacco: Current User     Tobacco comment: ecig   Substance Use Topics     Alcohol use: Not Currently     Comment: occasional       ROS:  SKIN: no rash  GI: no vomiting    OBJECTIVE:  /77   Pulse 87   Temp 98.9  F (37.2  C)   Resp 18   Wt 65.8 kg (145 lb)   SpO2 99%   BMI 22.05 kg/m  GENERAL APPEARANCE: healthy, alert and no distress  EYES: EOMI,  PERRL, conjunctiva clear  HENT: ear canals and TM's normal.  Nose and mouth without ulcers, erythema or lesions  RESP: lungs clear to auscultation - no rales, rhonchi or wheezes  SKIN: no suspicious lesions or rashes      ICD-10-CM    1. Suspected COVID-19 virus infection  Z20.822 Symptomatic; Unknown COVID-19 Virus (Coronavirus) by PCR Nose   2. Cough  R05.9 Streptococcus A Rapid Screen w/Reflex to PCR - Clinic Collect     Influenza A/B antigen   3. Strep throat  J02.0 amoxicillin (AMOXIL) 500 MG capsule       Fluids/Rest, f/u if worse/not any better

## 2022-01-12 ENCOUNTER — TELEPHONE (OUTPATIENT)
Dept: NURSING | Facility: CLINIC | Age: 27
End: 2022-01-12
Payer: COMMERCIAL

## 2022-01-12 NOTE — TELEPHONE ENCOUNTER
"Coronavirus (COVID-19) Notification    Caller Name (Patient, parent, daughter/son, grandparent, etc)  NavarroilStoddamilan    Reason for call  Notify of Positive Coronavirus (COVID-19) lab results, assess symptoms,  review  [employer] recommendations    Lab Result    Lab test:  2019-nCoV rRt-PCR or SARS-CoV-2 PCR    Oropharyngeal AND/OR nasopharyngeal swabs is POSITIVE for 2019-nCoV RNA/SARS-COV-2 PCR (COVID-19 virus)    RN Recommendations/Instructions per [facility] Coronavirus COVID-19 recommendations    Brief introduction script  Introduce self then review script:  \"I am calling on behalf of [employer].  We were notified that your Coronavirus test (COVID-19) for was POSITIVE for the virus.  I have some information to relay to you but first I wanted to mention that the [place] Dept of Health will be contacting you shortly [it's possible MD already called Patient] to talk to you more about how you are feeling and other people you have had contact with who might now also have the virus.  Also,  [employer] is Partnering with the [facility] for Covid-19 research, you may be contacted directly by research staff.\"    Assessment (Inquire about Patient's current symptoms)   Assessment   Current Symptoms at time of phone call: (if no symptoms, document No symptoms] cough   Symptoms onset (if applicable) 01/05/2022     If at time of call, Patients symptoms hare worsened, the Patient should contact 911 or have someone drive them to Emergency Dept promptly:      If Patient calling 911, inform 911 personal that you have tested positive for the Coronavirus (COVID-19).  Place mask on and await 911 to arrive.    If Emergency Dept, If possible, please have another adult drive you to the Emergency Dept but you need to wear mask when in contact with other people.      Monoclonal Antibody Administration    You may be eligible to receive a new treatment with a monoclonal antibody for preventing hospitalization in " patients at high risk for complications from COVID-19.   This medication is still experimental and available on a limited basis; it is given through an IV and must be given at an infusion center. Please note that not all people who are eligible will receive the medication since it is in limited supply.     Are you interested in being considered for this medication?  No.   Does the patient fit the criteria: Patient declined    Review information with Patient    Your result was positive. This means you have COVID-19 (coronavirus).  We have sent you a letter that reviews the information that I'll be reviewing with you now.    How can I protect others?    If you have symptoms: stay home and away from others (self-isolate) until:    You've had no fever--and no medicine that reduces fever--for 1 full day (24 hours). And       Your other symptoms have gotten better. For example, your cough or breathing has improved. And     At least 10 days have passed since your symptoms started. (If you've been told by a doctor that you have a weak immune system, wait 20 days.)     If you don't have symptoms: Stay home and away from others (self-isolate) until at least 10 days have passed since your first positive COVID-19 test. (Date test collected)    During this time:    Stay in your own room, including for meals. Use your own bathroom if you can.    Stay away from others in your home. No hugging, kissing or shaking hands. No visitors.     Don't go to work, school or anywhere else.     Clean  high touch  surfaces often (doorknobs, counters, handles, etc.). Use a household cleaning spray or wipes. You'll find a full list on the EPA website at www.epa.gov/pesticide-registration/list-n-disinfectants-use-against-sars-cov-2.     Cover your mouth and nose with a mask, tissue or other face covering to avoid spreading germs.    Wash your hands and face often with soap and water.    Make a list of people you have been in close contact with  recently, even if either of you wore a face covering.   - Start your list from 2 days before you became ill or had a positive test.  - Include anyone that was within 6 feet of you for a cumulative total of 15 minutes or more in 24 hours. (Example: if you sat next to Wes for 5 minutes in the morning and 10 minutes in the afternoon, then you were in close contact for 15 minutes total that day. Wes would be added to your list.)    A public health worker will call or text you. It is important that you answer. They will ask you questions about possible exposures to COVID-19, such as people you have been in direct contact with and places you have visited.    Tell the people on your list that you have COVID-19; they should stay away from others for 14 days starting from the last time they were in contact with you (unless you are told something different from a public health worker).     Caregivers in these groups are at risk for severe illness due to COVID-19:  o People 65 years and older  o People who live in a nursing home or long-term care facility  o People with chronic disease (lung, heart, cancer, diabetes, kidney, liver, immunologic)  o People who have a weakened immune system, including those who:  - Are in cancer treatment  - Take medicine that weakens the immune system, such as corticosteroids  - Had a bone marrow or organ transplant  - Have an immune deficiency  - Have poorly controlled HIV or AIDS  - Are obese (body mass index of 40 or higher)  - Smoke regularly    Caregivers should wear gloves while washing dishes, handling laundry and cleaning bedrooms and bathrooms.    Wash and dry laundry with special caution. Don't shake dirty laundry, and use the warmest water setting you can.    If you have a weakened immune system, ask your doctor about other actions you should take.    For more tips, go to www.cdc.gov/coronavirus/2019-ncov/downloads/10Things.pdf.    You should not go back to work until you meet the  guidelines above for ending your home isolation. You don't need to be retested for COVID-19 before going back to work--studies show that you won't spread the virus if it's been at least 10 days since your symptoms started (or 20 days, if you have a weak immune system).    Employers: This document serves as formal notice of your employee's medical guidelines for going back to work. They must meet the above guidelines before going back to work in person.    How can I take care of myself?    1. Get lots of rest. Drink extra fluids (unless a doctor has told you not to).    2. Take Tylenol (acetaminophen) for fever or pain. If you have liver or kidney problems, ask your family doctor if it's okay to take Tylenol.     Take either:     650 mg (two 325 mg pills) every 4 to 6 hours, or     1,000 mg (two 500 mg pills) every 8 hours as needed.     Note: Don't take more than 3,000 mg in one day. Acetaminophen is found in many medicines (both prescribed and over-the-counter medicines). Read all labels to be sure you don't take too much.    For children, check the Tylenol bottle for the right dose (based on their age or weight).    3. If you have other health problems (like cancer, heart failure, an organ transplant or severe kidney disease): Call your specialty clinic if you don't feel better in the next 2 days.    4. Know when to call 911: Emergency warning signs include:    Trouble breathing or shortness of breath    Pain or pressure in the chest that doesn't go away    Feeling confused like you haven't felt before, or not being able to wake up    Bluish-colored lips or face    5. Sign up for [employer]. We know it's scary to hear that you have COVID-19. We want to track your symptoms to make sure you're okay over the next 2 weeks. Please look for an email from [employer]--this is a free, online program that we'll use to keep in touch. To sign up, follow the link in the email. Learn more at  [email or web address].    Where can I get more information?    [facility]: [email or web address]    Coronavirus Basics: www.health.[facility].[place]/diseases/coronavirus/basics.html    What to Do If You're Sick: www.cdc.gov/coronavirus/2019-ncov/about/steps-when-sick.html    Ending Home Isolation: www.cdc.gov/coronavirus/2019-ncov/hcp/disposition-in-home-patients.html     Caring for Someone with COVID-19: www.cdc.gov/coronavirus/2019-ncov/if-you-are-sick/care-for-someone.html     [facility] clinical trials (COVID-19 research studies): clinicalaffairs.[facility]-clinical-trials     A Positive COVID-19 letter will be sent via "[employer] or the mail. (Exception, no letters sent to Presurgerical/Preprocedure Patients)    David Mcarthur RN

## 2023-07-19 ENCOUNTER — OFFICE VISIT (OUTPATIENT)
Dept: URGENT CARE | Facility: URGENT CARE | Age: 28
End: 2023-07-19
Payer: COMMERCIAL

## 2023-07-19 VITALS
TEMPERATURE: 98.5 F | WEIGHT: 146.8 LBS | OXYGEN SATURATION: 99 % | RESPIRATION RATE: 20 BRPM | SYSTOLIC BLOOD PRESSURE: 137 MMHG | BODY MASS INDEX: 22.32 KG/M2 | HEART RATE: 69 BPM | DIASTOLIC BLOOD PRESSURE: 73 MMHG

## 2023-07-19 DIAGNOSIS — J02.0 STREPTOCOCCAL SORE THROAT: Primary | ICD-10-CM

## 2023-07-19 LAB — DEPRECATED S PYO AG THROAT QL EIA: POSITIVE

## 2023-07-19 PROCEDURE — 99213 OFFICE O/P EST LOW 20 MIN: CPT | Performed by: FAMILY MEDICINE

## 2023-07-19 PROCEDURE — 87880 STREP A ASSAY W/OPTIC: CPT

## 2023-07-19 RX ORDER — PENICILLIN V POTASSIUM 500 MG/1
500 TABLET, FILM COATED ORAL 2 TIMES DAILY
Qty: 20 TABLET | Refills: 0 | Status: SHIPPED | OUTPATIENT
Start: 2023-07-19 | End: 2023-07-29

## 2023-07-19 NOTE — LETTER
July 19, 2023      Natalio Rodas  [address] [address]        To Whom It May Concern:    Natalio Rodas  was seen on today.  Please excuse him from work.        Sincerely,        Edson Rosales MD

## 2023-07-19 NOTE — PROGRESS NOTES
Assessment & Plan     Streptococcal sore throat  Strep throat  - Streptococcus A Rapid Screen w/Reflex to PCR - Clinic Collect  - penicillin V (VEETID) 500 MG tablet  Dispense: 20 tablet; Refill: 0             No follow-ups on file.    Edson Rosales MD  [facility] JOELLEN Lance is a 27 year old male who presents to clinic today for the following health issues:  Chief Complaint   Patient presents with     Pharyngitis     X2.5 weeks     HPI    ST for 2.5 weeks.  Worse last night.  Lozenges  Missed work  Some fever            Review of Systems        Objective    /73   Pulse 69   Temp 98.5  F (36.9  C) (Oral)   Resp 20   Wt 66.6 kg (146 lb 12.8 oz)   SpO2 99%   BMI 22.32 kg/m    Physical Exam  Vitals and nursing note reviewed.   Constitutional:       Appearance: Normal appearance.   HENT:      Mouth/Throat:      Mouth: Mucous membranes are moist.   Cardiovascular:      Rate and Rhythm: Normal rate and regular rhythm.      Pulses: Normal pulses.      Heart sounds: Normal heart sounds.   Pulmonary:      Effort: Pulmonary effort is normal.      Breath sounds: Normal breath sounds.   Lymphadenopathy:      Cervical: Cervical adenopathy present.   Neurological:      Mental Status: He is alert.

## 2023-11-10 ENCOUNTER — OFFICE VISIT (OUTPATIENT)
Dept: URGENT CARE | Facility: URGENT CARE | Age: 28
End: 2023-11-10
Payer: COMMERCIAL

## 2023-11-10 VITALS
RESPIRATION RATE: 16 BRPM | TEMPERATURE: 98.1 F | HEART RATE: 71 BPM | DIASTOLIC BLOOD PRESSURE: 66 MMHG | OXYGEN SATURATION: 99 % | SYSTOLIC BLOOD PRESSURE: 103 MMHG | BODY MASS INDEX: 21.9 KG/M2 | WEIGHT: 144 LBS

## 2023-11-10 DIAGNOSIS — R07.0 THROAT PAIN: Primary | ICD-10-CM

## 2023-11-10 DIAGNOSIS — H10.029 PINK EYE, UNSPECIFIED LATERALITY: ICD-10-CM

## 2023-11-10 DIAGNOSIS — J02.0 STREP THROAT: ICD-10-CM

## 2023-11-10 LAB — DEPRECATED S PYO AG THROAT QL EIA: POSITIVE

## 2023-11-10 PROCEDURE — 99214 OFFICE O/P EST MOD 30 MIN: CPT | Performed by: FAMILY MEDICINE

## 2023-11-10 PROCEDURE — 87880 STREP A ASSAY W/OPTIC: CPT | Performed by: FAMILY MEDICINE

## 2023-11-10 RX ORDER — AMOXICILLIN 500 MG/1
500 CAPSULE ORAL 2 TIMES DAILY
Qty: 20 CAPSULE | Refills: 0 | Status: SHIPPED | OUTPATIENT
Start: 2023-11-10 | End: 2023-11-20

## 2023-11-10 RX ORDER — TOBRAMYCIN 3 MG/ML
2 SOLUTION/ DROPS OPHTHALMIC 4 TIMES DAILY
Qty: 5 ML | Refills: 0 | Status: SHIPPED | OUTPATIENT
Start: 2023-11-10 | End: 2023-11-17

## 2023-11-11 NOTE — PROGRESS NOTES
SUBJECTIVE: Natalio Rodas is a 28 year old male presenting with a chief complaint of sore throat and pink mattery eyes.  Onset of symptoms was 3 day(s) ago.  Course of illness is same.    Severity moderate    Past Medical History:   Diagnosis Date    ADHD     Concussion     Oppositional defiant disorder     Paranoid schizophrenia (H)     Schizoaffective disorder, bipolar type (H)     Schizophrenia (H)      Allergies   Allergen Reactions    Nsaids      PT CAN NOT TAKE WITH LITHIUM      Social History     Tobacco Use    Smoking status: Former     Packs/day: 0.25     Years: 9.00     Additional pack years: 0.00     Total pack years: 2.25     Types: Cigarettes     Start date: 10/27/2010     Quit date: 2019     Years since quittin.3    Smokeless tobacco: Current    Tobacco comments:     ecig   Substance Use Topics    Alcohol use: Not Currently     Comment: occasional       ROS:  SKIN: no rash  GI: no vomiting    OBJECTIVE:  /66 (BP Location: Right arm, Patient Position: Sitting, Cuff Size: Adult Regular)   Pulse 71   Temp 98.1  F (36.7  C) (Oral)   Resp 16   Wt 65.3 kg (144 lb)   SpO2 99%   BMI 21.90 kg/m    GENERAL APPEARANCE: healthy, alert and no distress  EYES: conjunctiva/corneas- conjunctival injection OU and yellow colored discharge present bilateral  HENT: ear canals and TM's normal.  Nose and mouth without ulcers, erythema or lesions  RESP: lungs clear to auscultation - no rales, rhonchi or wheezes  SKIN: no suspicious lesions or rashes      ICD-10-CM    1. Throat pain  R07.0 Streptococcus A Rapid Screen w/Reflex to PCR - Clinic Collect      2. Pink eye, unspecified laterality  H10.029 tobramycin (TOBREX) 0.3 % ophthalmic solution      3. Strep throat  J02.0 amoxicillin (AMOXIL) 500 MG capsule          Fluids/Rest, f/u if worse/not any better

## 2023-11-21 ENCOUNTER — HOSPITAL ENCOUNTER (OUTPATIENT)
Dept: BEHAVIORAL HEALTH | Facility: CLINIC | Age: 28
Discharge: HOME | End: 2023-11-21
Attending: FAMILY MEDICINE | Admitting: FAMILY MEDICINE
Payer: COMMERCIAL

## 2023-11-21 VITALS — BODY MASS INDEX: 21.47 KG/M2 | WEIGHT: 150 LBS | HEIGHT: 70 IN

## 2023-11-21 DIAGNOSIS — F12.21 CANNABIS USE DISORDER, SEVERE, IN SUSTAINED REMISSION (H): Primary | ICD-10-CM

## 2023-11-21 PROBLEM — F16.91: Status: ACTIVE | Noted: 2023-11-21

## 2023-11-21 PROCEDURE — H0001 ALCOHOL AND/OR DRUG ASSESS: HCPCS

## 2023-11-21 ASSESSMENT — ANXIETY QUESTIONNAIRES
1. FEELING NERVOUS, ANXIOUS, OR ON EDGE: NOT AT ALL
3. WORRYING TOO MUCH ABOUT DIFFERENT THINGS: NOT AT ALL
7. FEELING AFRAID AS IF SOMETHING AWFUL MIGHT HAPPEN: NOT AT ALL
4. TROUBLE RELAXING: NOT AT ALL
GAD7 TOTAL SCORE: 1
2. NOT BEING ABLE TO STOP OR CONTROL WORRYING: NOT AT ALL
5. BEING SO RESTLESS THAT IT IS HARD TO SIT STILL: NOT AT ALL
6. BECOMING EASILY ANNOYED OR IRRITABLE: SEVERAL DAYS
GAD7 TOTAL SCORE: 1

## 2023-11-21 ASSESSMENT — PAIN SCALES - GENERAL: PAINLEVEL: NO PAIN (0)

## 2023-11-21 ASSESSMENT — PATIENT HEALTH QUESTIONNAIRE - PHQ9: SUM OF ALL RESPONSES TO PHQ QUESTIONS 1-9: 1

## 2023-11-21 ASSESSMENT — COLUMBIA-SUICIDE SEVERITY RATING SCALE - C-SSRS
1. HAVE YOU WISHED YOU WERE DEAD OR WISHED YOU COULD GO TO SLEEP AND NOT WAKE UP?: YES
2. HAVE YOU ACTUALLY HAD ANY THOUGHTS OF KILLING YOURSELF?: YES
2. HAVE YOU ACTUALLY HAD ANY THOUGHTS OF KILLING YOURSELF?: NO
1. IN THE PAST MONTH, HAVE YOU WISHED YOU WERE DEAD OR WISHED YOU COULD GO TO SLEEP AND NOT WAKE UP?: NO
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
## 2023-11-29 NOTE — ADDENDUM NOTE
Encounter addended by: Vilma Ross Formerly named [facility] on: 11/29/2023 12:48 PM   Actions taken: Clinical Note Signed

## 2023-12-18 ENCOUNTER — TELEPHONE (OUTPATIENT)
Dept: BEHAVIORAL HEALTH | Facility: CLINIC | Age: 28
End: 2023-12-18
Payer: COMMERCIAL

## 2023-12-18 NOTE — TELEPHONE ENCOUNTER
Pt is a(n) adult (18+ out of HS) Seeking as eval for Adult Mental Health DA for Programmatic Care - Program Preference? No.  Appointment scheduled by:  Patient.  (self-pay - complete Cost Estimate)  Caller name:  Natalio    Caller phone #: [phone]  Legal Guardianship Reviewed?  No  Honoring Choices Notified?  No  Brief reason for appt:  Call in apt request     needed?  NO    Contact information verified/updated: Yes    Ange Gautam

## 2023-12-29 ENCOUNTER — HOSPITAL ENCOUNTER (EMERGENCY)
Facility: CLINIC | Age: 28
Discharge: HOME | End: 2023-12-29
Attending: EMERGENCY MEDICINE | Admitting: EMERGENCY MEDICINE
Payer: COMMERCIAL

## 2023-12-29 VITALS
HEART RATE: 89 BPM | TEMPERATURE: 97.8 F | RESPIRATION RATE: 16 BRPM | HEIGHT: 70 IN | BODY MASS INDEX: 21.47 KG/M2 | WEIGHT: 150 LBS | SYSTOLIC BLOOD PRESSURE: 145 MMHG | OXYGEN SATURATION: 99 % | DIASTOLIC BLOOD PRESSURE: 87 MMHG

## 2023-12-29 DIAGNOSIS — F32.A DEPRESSION, UNSPECIFIED DEPRESSION TYPE: ICD-10-CM

## 2023-12-29 LAB
AMPHETAMINES UR QL SCN: ABNORMAL
BARBITURATES UR QL SCN: ABNORMAL
BENZODIAZ UR QL SCN: ABNORMAL
BZE UR QL SCN: ABNORMAL
CANNABINOIDS UR QL SCN: ABNORMAL
FENTANYL UR QL: ABNORMAL
OPIATES UR QL SCN: ABNORMAL
PCP QUAL URINE (ROCHE): ABNORMAL

## 2023-12-29 PROCEDURE — 80307 DRUG TEST PRSMV CHEM ANLYZR: CPT | Performed by: EMERGENCY MEDICINE

## 2023-12-29 PROCEDURE — 99284 EMERGENCY DEPT VISIT MOD MDM: CPT | Performed by: EMERGENCY MEDICINE

## 2023-12-29 RX ORDER — HYDROXYZINE HYDROCHLORIDE 25 MG/1
25 TABLET, FILM COATED ORAL
Qty: 30 TABLET | Refills: 0 | Status: SHIPPED | OUTPATIENT
Start: 2023-12-29 | End: 2024-01-28

## 2023-12-29 ASSESSMENT — ACTIVITIES OF DAILY LIVING (ADL)
ADLS_ACUITY_SCORE: 33
ADLS_ACUITY_SCORE: 35

## 2023-12-29 NOTE — DISCHARGE INSTRUCTIONS
Scheduled Appointment  Type: Telepsychiatry-Virtual  Date: Monday, 1/1/2024  Time: 1:00 pm - 2:00 pm  Provider: Heather Salamanca  MSN  CNP,PMHNP  Location: [address], [place] [address]  Phone: [phone]  Patient Instructions  I do in person appointments only on Thursdays at this location from 10am until 4pm with some exceptions. I do virtual visits the rest of the weekdays from 9am until 5pm. Please call  to clarify anything you need to know prior to your appointment. Anything that needs to be faxed e.g discharge paperwork should be done prior to the appointment. Our fax number is [phone]. The cover sheet should indicate my name  Heather Salamanca.       Type: Teletherapy-Virtual  Date: Tuesday, 1/2/2024  Time: 10:00 am - 11:00 am  Provider: David DICKENS  [facility]  Location: [facility], [address] [place] [address], [facility] [address], [place] [address]  Phone: [phone]  Patient Instructions  Telehealth/Virtual services only. No in-person services. Medicare/Medicaid plans currently not accepted. New patients are contacted via phone/email by CDS office managers (to confirm information), before 1st appointment. Electronic device w/video capabilities required for services. New patients must complete e-paperwork prior to 1st appointment (access to e-docs given by CDS  during intake). Call CDS w/questions [phone] or email [email or web address]

## 2023-12-29 NOTE — ED PROVIDER NOTES
ED Provider Note  [facility]      History     Chief Complaint   Patient presents with    depression    Stress     HPI  Natalio Rodas is a 28 year old male who has a PMHx of bipolar 1 presenting with mental health concerns.  Patient says that he has been very stressed out, unable to sleep.  He does have some voices that he says are very active.  He is denying thoughts of harming self or others.  No visual hallucinations.  The patient denies any physical discomfort.  He uses marijuana daily but denies taking any other medications.  He says these not supposed to be on medications.  He states he does not have a psychiatrist or therapist at this point.    Past Medical History  Past Medical History:   Diagnosis Date    ADHD     Concussion     Oppositional defiant disorder     Paranoid schizophrenia (H)     Schizoaffective disorder, bipolar type (H)     Schizophrenia (H)      Past Surgical History:   Procedure Laterality Date    DENTAL SURGERY      wisdom teeth    NOSE SURGERY  08/2019    injury    OPEN REDUCTION INTERNAL FIXATION ELBOW Right 11/5/2019    Procedure: OPEN REDUCTION INTERNAL FIXATION right olecranon fracture;  Surgeon: Vahid Goncalves MD;  [facility]: [facility]     acetaminophen (TYLENOL) 325 MG tablet  acetaminophen (TYLENOL) 500 MG tablet  erythromycin (ROMYCIN) 5 MG/GM ophthalmic ointment  hypromellose-dextran (ARTIFICAL TEARS) 0.1-0.3 % ophthalmic solution  lithium ER (LITHOBID/ESKALITH CR) 300 MG CR tablet      Allergies   Allergen Reactions    Nsaids      PT CAN NOT TAKE WITH LITHIUM      Family History  Family History   Problem Relation Age of Onset    Substance Abuse Paternal Grandfather     Substance Abuse Brother     Substance Abuse Paternal Uncle     Substance Abuse Other     Anesthesia Reaction No family hx of     Cardiovascular No family hx of      Social History   Social History     Tobacco Use    Smoking status: Every Day     Packs/day: 0.25     Years: 9.00     " Additional pack years: 0.00     Total pack years: 2.25     Types: Cigarettes, Vaping Device     Start date: 10/27/2010     Last attempt to quit: 2019     Years since quittin.4    Smokeless tobacco: Never    Tobacco comments:     ecig   Substance Use Topics    Alcohol use: Not Currently     Comment: occasional    Drug use: Not Currently     Types: Marijuana         A medically appropriate review of systems was performed with pertinent positives and negatives noted in the HPI, and all other systems negative.    Physical Exam   BP: (!) 140/99  Pulse: 109  Temp: 97.5  F (36.4  C)  Resp: 16  Height: 177.8 cm (5' 10\")  Weight: 68 kg (150 lb)  SpO2: 96 %  Physical Exam  Physical Exam   Constitutional: oriented to person, place, and time. appears well-developed and well-nourished.   HENT:   Head: Normocephalic and atraumatic.   Neck: Normal range of motion.   Pulmonary/Chest: Effort normal. No respiratory distress.   Cardiac: No murmurs, rubs, gallops. RRR.  Abdominal: Abdomen soft, nontender, nondistended. No rebound tenderness.  MSK: Long bones without deformity or evidence of trauma  Neurological: alert and oriented to person, place, and time.   Skin: Skin is warm and dry.   Psychiatric:  normal mood and affect.  behavior is normal. Thought content normal.       ED Course, Procedures, & Data      Procedures              No results found for any visits on 23.  Medications - No data to display  Labs Ordered and Resulted from Time of ED Arrival to Time of ED Departure - No data to display  No orders to display          Critical care was not performed.     Medical Decision Making  The patient's presentation was of moderate complexity (a chronic illness mild to moderate exacerbation, progression, or side effect of treatment).    The patient's evaluation involved:  review of external note(s) from 1 sources (prior ED behavioral health note)  discussion of management or test interpretation with another health " professional (mental health )    The patient's management necessitated further care after sign-out to Sacha (see their note for further management).    Assessment & Plan    MDM  Patient is presenting with increasing depression.  He does have a host of hospitalizations and psychiatric diagnoses.  Patient does not seem to be holdable at this point should he try to leave.  Behavioral assessment will be done prior to final disposition    I have reviewed the nursing notes. I have reviewed the findings, diagnosis, plan and need for follow up with the patient.    New Prescriptions    No medications on file       Final diagnoses:   Depression, unspecified depression type       Arsh Vigil  [facility] EMERGENCY DEPARTMENT  12/29/2023     Arsh Vigil MD  12/29/23 0616
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
## 2023-12-29 NOTE — ED TRIAGE NOTES
"  Pt. states stress out and depressed.   \"Lots of grief in life.\"  Unable to sleep.  Denies SI/HI.   Triage Assessment (Adult)       Row Name 12/29/23 0602          Triage Assessment    Airway WDL WDL        Respiratory WDL    Respiratory WDL WDL        Skin Circulation/Temperature WDL    Skin Circulation/Temperature WDL WDL        Cardiac WDL    Cardiac WDL WDL        Peripheral/Neurovascular WDL    Peripheral Neurovascular WDL WDL        Cognitive/Neuro/Behavioral WDL    Cognitive/Neuro/Behavioral WDL WDL                     "

## 2023-12-29 NOTE — ED PROVIDER NOTES
Patient signed out pending DEC assessment.  Patient was evaluated by the DEC team who also obtained collateral information from family, and overall patient has a history of mental health disorders that he has been able to manage outpatient, has not been hospitalized in years, and recently had breakthrough mental issues in the setting of recent travel.  He feels improved now, is not suicidal, homicidal, and denies any elements of psychosis including hallucinations.  He was given a close follow-up transition plan.  He requested medication for sleep and was given hydroxyzine PRN.  Discharged with outpatient follow-up and return precautions.  Patient, family, and teams evaluated patient today are in agreement with this plan.     Reymundo Gómez MD  12/29/23 9297

## 2024-01-02 ENCOUNTER — TELEPHONE (OUTPATIENT)
Dept: BEHAVIORAL HEALTH | Facility: CLINIC | Age: 29
End: 2024-01-02
Payer: COMMERCIAL

## 2024-01-02 NOTE — TELEPHONE ENCOUNTER
----- Message from Chikis Aivles sent at 1/1/2024  3:22 PM CST -----  Can you please run benefits for the DA that is scheduled tomorrow 1/2/2024?    Thank you.  Chikis Aviles  Transition Clinic Coordinator  01/01/24 3:24 PM

## 2024-01-02 NOTE — TELEPHONE ENCOUNTER
Pt scheduled for MH Eval/DA today at 7:30 am, pt did not show.  This writer sent Phig[employer] link x2, and called pt at number listed in pt chart, and left general discrete message.  Pt can call and schedule at anytime with Transitions Clinic Intake at [phone].

## 2024-07-12 ENCOUNTER — HOSPITAL ENCOUNTER (INPATIENT)
Facility: CLINIC | Age: 29
LOS: 12 days | Discharge: PSYCHIATRIC OR SUBSTANCE USE FACILITY | End: 2024-07-29
Attending: PSYCHIATRY & NEUROLOGY | Admitting: PSYCHIATRY & NEUROLOGY
Payer: MEDICAID

## 2024-07-12 DIAGNOSIS — F25.0 SCHIZOAFFECTIVE DISORDER, BIPOLAR TYPE (H): ICD-10-CM

## 2024-07-12 DIAGNOSIS — Z86.59 PERSONAL HISTORY OF MENTAL DISORDER: ICD-10-CM

## 2024-07-12 DIAGNOSIS — G47.00 INSOMNIA, UNSPECIFIED TYPE: ICD-10-CM

## 2024-07-12 DIAGNOSIS — F12.21 CANNABIS USE DISORDER, SEVERE, IN SUSTAINED REMISSION (H): ICD-10-CM

## 2024-07-12 DIAGNOSIS — Z86.59 HISTORY OF SCHIZOAFFECTIVE DISORDER: ICD-10-CM

## 2024-07-12 DIAGNOSIS — F17.200 TOBACCO USE DISORDER: ICD-10-CM

## 2024-07-12 DIAGNOSIS — T39.1X1A ACCIDENTAL PARACETAMOL POISONING, INITIAL ENCOUNTER: ICD-10-CM

## 2024-07-12 DIAGNOSIS — F41.9 ANXIETY: ICD-10-CM

## 2024-07-12 DIAGNOSIS — L70.9 ACNE, UNSPECIFIED ACNE TYPE: ICD-10-CM

## 2024-07-12 DIAGNOSIS — E56.9 VITAMIN DEFICIENCY: ICD-10-CM

## 2024-07-12 DIAGNOSIS — T39.311A: Primary | ICD-10-CM

## 2024-07-12 DIAGNOSIS — R46.89 AGGRESSIVE BEHAVIOR: ICD-10-CM

## 2024-07-12 DIAGNOSIS — T50.901A OVERDOSE BY INGESTION: ICD-10-CM

## 2024-07-12 DIAGNOSIS — Z75.8: ICD-10-CM

## 2024-07-12 DIAGNOSIS — Z60.4 SOCIAL ISOLATION: ICD-10-CM

## 2024-07-12 LAB
ALBUMIN SERPL BCG-MCNC: 4.8 G/DL (ref 3.5–5.2)
ALP SERPL-CCNC: 83 U/L (ref 40–150)
ALT SERPL W P-5'-P-CCNC: 17 U/L (ref 0–70)
ANION GAP SERPL CALCULATED.3IONS-SCNC: 12 MMOL/L (ref 7–15)
APAP SERPL-MCNC: 25.7 UG/ML (ref 10–30)
AST SERPL W P-5'-P-CCNC: 27 U/L (ref 0–45)
BASOPHILS # BLD AUTO: 0.1 10E3/UL (ref 0–0.2)
BASOPHILS NFR BLD AUTO: 1 %
BILIRUB SERPL-MCNC: 0.5 MG/DL
BUN SERPL-MCNC: 10.6 MG/DL (ref 6–20)
CALCIUM SERPL-MCNC: 9.7 MG/DL (ref 8.6–10)
CHLORIDE SERPL-SCNC: 102 MMOL/L (ref 98–107)
CREAT SERPL-MCNC: 1.02 MG/DL (ref 0.67–1.17)
DEPRECATED HCO3 PLAS-SCNC: 24 MMOL/L (ref 22–29)
EGFRCR SERPLBLD CKD-EPI 2021: >90 ML/MIN/1.73M2
EOSINOPHIL # BLD AUTO: 0.1 10E3/UL (ref 0–0.7)
EOSINOPHIL NFR BLD AUTO: 1 %
ERYTHROCYTE [DISTWIDTH] IN BLOOD BY AUTOMATED COUNT: 13 % (ref 10–15)
GLUCOSE SERPL-MCNC: 100 MG/DL (ref 70–99)
HCT VFR BLD AUTO: 48.4 % (ref 40–53)
HGB BLD-MCNC: 16 G/DL (ref 13.3–17.7)
IMM GRANULOCYTES # BLD: 0 10E3/UL
IMM GRANULOCYTES NFR BLD: 0 %
LYMPHOCYTES # BLD AUTO: 1.5 10E3/UL (ref 0.8–5.3)
LYMPHOCYTES NFR BLD AUTO: 19 %
MCH RBC QN AUTO: 30.4 PG (ref 26.5–33)
MCHC RBC AUTO-ENTMCNC: 33.1 G/DL (ref 31.5–36.5)
MCV RBC AUTO: 92 FL (ref 78–100)
MONOCYTES # BLD AUTO: 0.4 10E3/UL (ref 0–1.3)
MONOCYTES NFR BLD AUTO: 5 %
NEUTROPHILS # BLD AUTO: 5.9 10E3/UL (ref 1.6–8.3)
NEUTROPHILS NFR BLD AUTO: 74 %
NRBC # BLD AUTO: 0 10E3/UL
NRBC BLD AUTO-RTO: 0 /100
PLATELET # BLD AUTO: 279 10E3/UL (ref 150–450)
POTASSIUM SERPL-SCNC: 3.7 MMOL/L (ref 3.4–5.3)
PROT SERPL-MCNC: 7.6 G/DL (ref 6.4–8.3)
RBC # BLD AUTO: 5.27 10E6/UL (ref 4.4–5.9)
SALICYLATES SERPL-MCNC: <0.3 MG/DL
SARS-COV-2 RNA RESP QL NAA+PROBE: NEGATIVE
SODIUM SERPL-SCNC: 138 MMOL/L (ref 135–145)
TSH SERPL DL<=0.005 MIU/L-ACNC: 0.66 UIU/ML (ref 0.3–4.2)
WBC # BLD AUTO: 7.9 10E3/UL (ref 4–11)

## 2024-07-12 PROCEDURE — 80179 DRUG ASSAY SALICYLATE: CPT | Performed by: PSYCHIATRY & NEUROLOGY

## 2024-07-12 PROCEDURE — 87635 SARS-COV-2 COVID-19 AMP PRB: CPT | Performed by: PSYCHIATRY & NEUROLOGY

## 2024-07-12 PROCEDURE — 82040 ASSAY OF SERUM ALBUMIN: CPT | Performed by: PSYCHIATRY & NEUROLOGY

## 2024-07-12 PROCEDURE — 85025 COMPLETE CBC W/AUTO DIFF WBC: CPT | Performed by: PSYCHIATRY & NEUROLOGY

## 2024-07-12 PROCEDURE — 99285 EMERGENCY DEPT VISIT HI MDM: CPT | Performed by: PSYCHIATRY & NEUROLOGY

## 2024-07-12 PROCEDURE — 36415 COLL VENOUS BLD VENIPUNCTURE: CPT | Performed by: PSYCHIATRY & NEUROLOGY

## 2024-07-12 PROCEDURE — 83036 HEMOGLOBIN GLYCOSYLATED A1C: CPT | Performed by: REGISTERED NURSE

## 2024-07-12 PROCEDURE — 80143 DRUG ASSAY ACETAMINOPHEN: CPT | Performed by: PSYCHIATRY & NEUROLOGY

## 2024-07-12 PROCEDURE — 84443 ASSAY THYROID STIM HORMONE: CPT | Performed by: PSYCHIATRY & NEUROLOGY

## 2024-07-12 PROCEDURE — 250N000013 HC RX MED GY IP 250 OP 250 PS 637: Performed by: PSYCHIATRY & NEUROLOGY

## 2024-07-12 RX ORDER — OLANZAPINE 5 MG/1
5 TABLET, ORALLY DISINTEGRATING ORAL 2 TIMES DAILY PRN
Status: DISCONTINUED | OUTPATIENT
Start: 2024-07-12 | End: 2024-07-17

## 2024-07-12 RX ORDER — OLANZAPINE 10 MG/2ML
10 INJECTION, POWDER, FOR SOLUTION INTRAMUSCULAR 2 TIMES DAILY PRN
Status: DISCONTINUED | OUTPATIENT
Start: 2024-07-12 | End: 2024-07-17

## 2024-07-12 RX ORDER — OLANZAPINE 5 MG/1
5 TABLET, ORALLY DISINTEGRATING ORAL 2 TIMES DAILY PRN
Status: DISCONTINUED | OUTPATIENT
Start: 2024-07-12 | End: 2024-07-12

## 2024-07-12 RX ORDER — IBUPROFEN 200 MG
200 TABLET ORAL EVERY 4 HOURS PRN
Status: ON HOLD | COMMUNITY
End: 2024-07-25

## 2024-07-12 RX ORDER — OLANZAPINE 5 MG/1
5 TABLET, ORALLY DISINTEGRATING ORAL AT BEDTIME
Status: DISCONTINUED | OUTPATIENT
Start: 2024-07-12 | End: 2024-07-22

## 2024-07-12 RX ORDER — OLANZAPINE 10 MG/2ML
10 INJECTION, POWDER, FOR SOLUTION INTRAMUSCULAR 2 TIMES DAILY PRN
Status: DISCONTINUED | OUTPATIENT
Start: 2024-07-12 | End: 2024-07-12

## 2024-07-12 RX ADMIN — OLANZAPINE 5 MG: 5 TABLET, ORALLY DISINTEGRATING ORAL at 18:01

## 2024-07-12 SDOH — SOCIAL STABILITY - SOCIAL INSECURITY: SOCIAL EXCLUSION AND REJECTION: Z60.4

## 2024-07-12 ASSESSMENT — COLUMBIA-SUICIDE SEVERITY RATING SCALE - C-SSRS
6. HAVE YOU EVER DONE ANYTHING, STARTED TO DO ANYTHING, OR PREPARED TO DO ANYTHING TO END YOUR LIFE?: NO
1. IN THE PAST MONTH, HAVE YOU WISHED YOU WERE DEAD OR WISHED YOU COULD GO TO SLEEP AND NOT WAKE UP?: NO
2. HAVE YOU ACTUALLY HAD ANY THOUGHTS OF KILLING YOURSELF IN THE PAST MONTH?: NO

## 2024-07-12 ASSESSMENT — ACTIVITIES OF DAILY LIVING (ADL)
ADLS_ACUITY_SCORE: 35

## 2024-07-12 NOTE — ED NOTES
Bed: [facility]-  Expected date:   Expected time:   Means of arrival:   Comments:  [address]  28y M  , on hold

## 2024-07-12 NOTE — PLAN OF CARE
Natalio Rodas  July 12, 2024  Plan of Care Hand-off Note     Patient Care Path: inpatient mental health    Plan for Care:  Pt has a diagnosis of Schizoaffective Disorder, Bipolar Type.  He is not taking medication.  Pt is hearing voices and delusional.  He is not caring for himself.  Pt is threatening with family.  He needs inpatient mental health for safety and stabilization.        Identified Goals and Safety Issues:  Stabilize on medication          Legal Status: Legal Status at Admission: Voluntary/Patient has signed consent for treatment    Psychiatry Consult: Yes       Updated provider  regarding plan of care.           Marie Diaz, LATISHA

## 2024-07-12 NOTE — ED PROVIDER NOTES
[facility] ([facility])    7/12/24      ED PROVIDER NOTE   Keo C   History     Chief Complaint   Patient presents with    Psychiatric Evaluation     Emt called to pt home due to pt becoming dysregulated and threatening towards family. Pt also missed a court date today. Court date was set for prior incident involving pt aggression.      The history is provided by the patient and medical records.     Natalio Rodas is a 28 year old male with history of schizoaffective disorder- bipolar type, marijuana use, prior episodes of insomnia and irritability who presents to the emergency Department via EMS for agitation.  He had a court date set due to a prior incidence where he was aggressive.  He missed his court date today, became dysregulated and was threatening towards family.    Patient admits to acting out as he got mad that his keys were taken from him, and was feeling overwhelmed as everyone wanted him to do something such as take his meds, go to court, get an assessment. Patient admits ingesting 2.5 gm ibuprofen and 2.5 gm tylenol out of anger around noon. He feels he has a headache from taking those pills. He otherwise denies taking anything else including any prescribed meds. He smokes THC and cigarettes. He is under the impression that he is here to get assessed and treated.    Father per 's report, feels patient should be hospitalized, get committed and get back on meds as he has been aggressive at home, that the family is afraid of him, and he appears psychotic.    Patient has been in emotional and behavioral control here.    Past Medical History  Past Medical History:   Diagnosis Date    ADHD     Concussion     Oppositional defiant disorder     Paranoid schizophrenia (H)     Schizoaffective disorder, bipolar type (H)     Schizophrenia (H)      Past Surgical History:   Procedure Laterality Date    DENTAL SURGERY      wisdom teeth    NOSE SURGERY  08/2019    injury     OPEN REDUCTION INTERNAL FIXATION ELBOW Right 2019    Procedure: OPEN REDUCTION INTERNAL FIXATION right olecranon fracture;  Surgeon: Vahid Goncalves MD;  [facility]     acetaminophen (TYLENOL) 325 MG tablet  acetaminophen (TYLENOL) 500 MG tablet  erythromycin (ROMYCIN) 5 MG/GM ophthalmic ointment  hypromellose-dextran (ARTIFICAL TEARS) 0.1-0.3 % ophthalmic solution  lithium ER (LITHOBID/ESKALITH CR) 300 MG CR tablet      Allergies   Allergen Reactions    Nsaids      PT CAN NOT TAKE WITH LITHIUM      Family History  Family History   Problem Relation Age of Onset    Substance Abuse Paternal Grandfather     Substance Abuse Brother     Substance Abuse Paternal Uncle     Substance Abuse Other     Anesthesia Reaction No family hx of     Cardiovascular No family hx of      Social History   Social History     Tobacco Use    Smoking status: Every Day     Current packs/day: 0.00     Average packs/day: 0.3 packs/day for 9.0 years (2.2 ttl pk-yrs)     Types: Cigarettes, Vaping Device     Start date: 10/27/2010     Last attempt to quit: 2019     Years since quittin.0    Smokeless tobacco: Never    Tobacco comments:     ecig   Substance Use Topics    Alcohol use: Not Currently     Comment: occasional    Drug use: Not Currently     Types: Marijuana      A medically appropriate review of systems was performed with pertinent positives and negatives noted in the HPI, and all other systems negative.    Physical Exam   BP: 120/70  Pulse: 60  Physical Exam  Vitals and nursing note reviewed.   HENT:      Head: Normocephalic.   Eyes:      Pupils: Pupils are equal, round, and reactive to light.   Pulmonary:      Effort: Pulmonary effort is normal.   Musculoskeletal:         General: Normal range of motion.      Cervical back: Normal range of motion.   Neurological:      General: No focal deficit present.      Mental Status: He is alert.   Psychiatric:         Attention and Perception: Attention normal. He does not  perceive auditory or visual hallucinations.         Mood and Affect: Mood is anxious. Affect is angry and inappropriate.         Speech: Speech normal.         Behavior: Behavior normal. Behavior is not agitated, aggressive, hyperactive or combative. Behavior is cooperative.         Thought Content: Thought content normal. Thought content does not include homicidal ideation.         Cognition and Memory: Cognition and memory normal.         Judgment: Judgment normal.           ED Course, Procedures, & Data      Procedures       A consult was attained from the [facility] service. The case was discussed with the  from that service. The consulting service's recommendations were provided at 3:30 PM, 10 minutes spent discussing case, care and disposition. 10 minutes spent reviewing prior records and intervention.      No results found for any visits on 07/12/24.  Medications - No data to display  Labs Ordered and Resulted from Time of ED Arrival to Time of ED Departure - No data to display  No orders to display          Critical care was not performed.     Medical Decision Making  The patient's presentation was of high complexity (an acute health issue posing potential threat to life or bodily function).    The patient's evaluation involved:  an assessment requiring an independent historian (see separate area of note for details)  review of external note(s) from 2 sources (see separate area of note for details)  review of 2 test result(s) ordered prior to this encounter (see separate area of note for details)  ordering and/or review of 2 test(s) in this encounter (see separate area of note for details)    The patient's management necessitated moderate risk (limitations due to social determinants of health (healthcare access difficulty, Incarceration/legal issues, social isolation, and substance use)) and high risk (a decision regarding hospitalization).    Assessment & Plan    Patient is here via EMS from home as  family called 911 due to patient's behavioral outburst. Patient has history of schizoaffective disorder and he has not been taking his meds. Father feels that he has decompensated. Patient appears to be minimizing his symptoms here and is a poor historian. He allegedly ingested ibuprofen and tylenol as a suicide attempt - citing it was out of anger - prior to arrival. Patient is get an overdose work-up and if medically cleared, can be referred to inpatient . He is consenting to the admission.    I have reviewed the nursing notes. I have reviewed the findings, diagnosis, plan and need for follow up with the patient.    New Prescriptions    No medications on file       Final diagnoses:   Aggressive behavior   Overdose by ingestion   History of schizoaffective disorder     I, Mary Vieira, am serving as a trained medical scribe to document services personally performed by Miguel Martinez MD based on the provider's statements to me on July 12, 2024.  This document has been checked and approved by the attending provider.    I, Migule Martinez MD, was physically present and have reviewed and verified the accuracy of this note documented by Mary Vieira, medical scribe.      Miguel Martinez MD   [facility] EMERGENCY DEPARTMENT  7/12/2024        Miguel Martinez MD  07/12/24 [id number]

## 2024-07-12 NOTE — ED NOTES
IP MH Referral Acuity Rating Score (RARS)    LMHP complete at referral to IP MH, with DEC; and, daily while awaiting IP MH placement. Call score to PPS.  CRITERIA SCORING   New 72 HH and Involuntary for IP MH (not adolescent) 0/1   Boarding over 24 hours 0/1   Vulnerable adult at least 55+ with multiple co morbidities; or, Patient age 11 or under 0/1   Suicide ideation without relief of precipitating factors 0/1   Current plan for suicide 0/1   Current plan for homicide 0/1   Imminent risk or actual attempt to seriously harm another without relief of factors precipitating the attempt 1/1   Severe dysfunction in daily living (ex: complete neglect for self care, extreme disruption in vegetative function, extreme deterioration in social interactions) 1/1   Recent (last 2 weeks) or current physical aggression in the ED 0/1   Restraints or seclusion episode in ED 0/1   Verbal aggression, agitation, yelling, etc., while in the ED 0/10   Active psychosis with psychomotor agitation or catatonia 1/1   Need for constant or near constant redirection (from leaving, from others, etc).  0/1   Intrusive or disruptive behaviors 0/1   TOTAL Acuity Total Score: 3
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
## 2024-07-12 NOTE — ED TRIAGE NOTES
Emt called to pt home due to pt becoming dysregulated and threatening towards family. Pt also missed a court date today. Court date was set for prior incident involving pt aggression.      Triage Assessment (Adult)       Row Name 07/12/24 1253          Triage Assessment    Airway WDL WDL        Respiratory WDL    Respiratory WDL WDL        Skin Circulation/Temperature WDL    Skin Circulation/Temperature WDL WDL        Cardiac WDL    Cardiac WDL WDL        Cognitive/Neuro/Behavioral WDL    Cognitive/Neuro/Behavioral WDL X;mood/behavior     Level of Consciousness alert     Arousal Level opens eyes spontaneously     Orientation oriented x 4     Speech clear     Mood/Behavior anxious;flat affect;hypoactive (quiet, withdrawn);threatening        Pupils (CN II)    Pupil PERRLA yes        [facility] Coma Scale    Best Eye Response 4-->(E4) spontaneous     Best Motor Response 6-->(M6) obeys commands     Best Verbal Response 5-->(V5) oriented     [place] Coma Scale Score 15

## 2024-07-12 NOTE — ED NOTES
27 yo m received to [facility]. Flat , tense but calm , repeating over and over that his belt , wallet , and shoes are not with his stuff. This nurse called ED several times and his belongings were found ad locked up w/ the rest of his things. Finally gave his hoodie w/ strings up but refused to give up his watch. Dr. Martinez aware. Ate well and sitting up in [facility] watching tv. Did not want to see his room at this time.SI- no HI- hesitant no SIB- no AVH - probably no. Sleep not very well. Have trouble staying and going to sleep. - insomnia. ETOH- 1 x week. Drugs - THC and Cigs. Meds - none are prescribed for mr. Triggers- losing my stuff! Do not like to argue. People overstepping boundaries. Mood - irritable - Feel like my time is being wasted by being asked the same stuff over and over.

## 2024-07-13 ENCOUNTER — TELEPHONE (OUTPATIENT)
Dept: BEHAVIORAL HEALTH | Facility: CLINIC | Age: 29
End: 2024-07-13
Payer: MEDICAID

## 2024-07-13 PROCEDURE — 99417 PROLNG OP E/M EACH 15 MIN: CPT

## 2024-07-13 PROCEDURE — 250N000013 HC RX MED GY IP 250 OP 250 PS 637: Performed by: PSYCHIATRY & NEUROLOGY

## 2024-07-13 PROCEDURE — 99245 OFF/OP CONSLTJ NEW/EST HI 55: CPT

## 2024-07-13 PROCEDURE — 250N000013 HC RX MED GY IP 250 OP 250 PS 637: Performed by: EMERGENCY MEDICINE

## 2024-07-13 RX ADMIN — NICOTINE POLACRILEX 2 MG: 2 GUM, CHEWING BUCCAL at 10:39

## 2024-07-13 RX ADMIN — OLANZAPINE 5 MG: 5 TABLET, ORALLY DISINTEGRATING ORAL at 21:23
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
## 2024-07-13 NOTE — ED PROVIDER NOTES
[facility] ED Mental Health Handoff Note:       Brief HPI:  This is a 28 year old male signed out to me.  See initial ED Provider note for full details of the presentation. Interval history is pertinent for ED boarding, awaiting psychiatric bed assignment.    Home meds reviewed and ordered/administered: Yes    Medically stable for inpatient mental health admission: Yes.    Evaluated by mental health: Yes. The recommendation is for inpatient mental health treatment. Bed search in process    Safety concerns: At the time I received sign out, there were no safety concerns.    Hold Status:  Active Orders   N/A            Exam:   Patient Vitals for the past 24 hrs:   BP Temp Temp src Pulse Resp SpO2   07/12/24 1852 123/85 97.7  F (36.5  C) Oral 60 18 100 %   07/12/24 1658 134/82 97.6  F (36.4  C) Oral 55 16 99 %   07/12/24 1654 -- -- -- -- 16 --           ED Course:    Medications   OLANZapine zydis (zyPREXA) ODT tab 5 mg (5 mg Oral Not Given 7/12/24 2201)   OLANZapine zydis (zyPREXA) ODT tab 5 mg (5 mg Oral $Given 7/12/24 1801)     Or   OLANZapine (zyPREXA) injection 10 mg ( Intramuscular See Alternative 7/12/24 1801)   nicotine (NICORETTE) gum 2 mg (2 mg Buccal $Given 7/13/24 1039)            There were no significant events during my shift.    Patient was signed out to the oncoming provider.      Impression:    ICD-10-CM    1. Aggressive behavior  R46.89       2. Overdose by ingestion  T50.901A       3. History of schizoaffective disorder  Z86.59           Plan:    Awaiting inpatient mental health admission/transfer.      RESULTS:   Results for orders placed or performed during the hospital encounter of 07/12/24 (from the past 24 hour(s))   CBC with platelets differential     Status: None    Collection Time: 07/12/24  4:04 PM    Narrative    The following orders were created for panel order CBC with platelets differential.  Procedure                               Abnormality         Status                      ---------                               -----------         ------                     CBC with platelets and d...[[id number]]                      Final result                 Please view results for these tests on the individual orders.   Comprehensive metabolic panel     Status: Abnormal    Collection Time: 07/12/24  4:04 PM   Result Value Ref Range    Sodium 138 135 - 145 mmol/L    Potassium 3.7 3.4 - 5.3 mmol/L    Carbon Dioxide (CO2) 24 22 - 29 mmol/L    Anion Gap 12 7 - 15 mmol/L    Urea Nitrogen 10.6 6.0 - 20.0 mg/dL    Creatinine 1.02 0.67 - 1.17 mg/dL    GFR Estimate >90 >60 mL/min/1.73m2    Calcium 9.7 8.6 - 10.0 mg/dL    Chloride 102 98 - 107 mmol/L    Glucose 100 (H) 70 - 99 mg/dL    Alkaline Phosphatase 83 40 - 150 U/L    AST 27 0 - 45 U/L    ALT 17 0 - 70 U/L    Protein Total 7.6 6.4 - 8.3 g/dL    Albumin 4.8 3.5 - 5.2 g/dL    Bilirubin Total 0.5 <=1.2 mg/dL   TSH with free T4 reflex     Status: Normal    Collection Time: 07/12/24  4:04 PM   Result Value Ref Range    TSH 0.66 0.30 - 4.20 uIU/mL   Salicylate level     Status: Normal    Collection Time: 07/12/24  4:04 PM   Result Value Ref Range    Salicylate <0.3   mg/dL   Acetaminophen level     Status: Normal    Collection Time: 07/12/24  4:04 PM   Result Value Ref Range    Acetaminophen 25.7 10.0 - 30.0 ug/mL   CBC with platelets and differential     Status: None    Collection Time: 07/12/24  4:04 PM   Result Value Ref Range    WBC Count 7.9 4.0 - 11.0 10e3/uL    RBC Count 5.27 4.40 - 5.90 10e6/uL    Hemoglobin 16.0 13.3 - 17.7 g/dL    Hematocrit 48.4 40.0 - 53.0 %    MCV 92 78 - 100 fL    MCH 30.4 26.5 - 33.0 pg    MCHC 33.1 31.5 - 36.5 g/dL    RDW 13.0 10.0 - 15.0 %    Platelet Count 279 150 - 450 10e3/uL    % Neutrophils 74 %    % Lymphocytes 19 %    % Monocytes 5 %    % Eosinophils 1 %    % Basophils 1 %    % Immature Granulocytes 0 %    NRBCs per 100 WBC 0 <1 /100    Absolute Neutrophils 5.9 1.6 - 8.3 10e3/uL    Absolute Lymphocytes 1.5 0.8 -  5.3 10e3/uL    Absolute Monocytes 0.4 0.0 - 1.3 10e3/uL    Absolute Eosinophils 0.1 0.0 - 0.7 10e3/uL    Absolute Basophils 0.1 0.0 - 0.2 10e3/uL    Absolute Immature Granulocytes 0.0 <=0.4 10e3/uL    Absolute NRBCs 0.0 10e3/uL   Asymptomatic COVID-19 Virus (Coronavirus) by PCR Nose     Status: Normal    Collection Time: 07/12/24  4:06 PM    Specimen: Nose; Swab   Result Value Ref Range    SARS CoV2 PCR Negative Negative    Narrative    Testing was performed using the Xpert Xpress SARS-CoV-2 Assay on the Cepheid Gene-Xpert Instrument Systems. Additional information about this Emergency Use Authorization (EUA) assay can be found via the Lab Guide. This test should be ordered for the detection of SARS-CoV-2 in individuals who meet SARS-CoV-2 clinical and/or epidemiological criteria as well as from individuals without symptoms or other reasons to suspect COVID-19. Test performance for asymptomatic patients has only been established in anterior nasal swab specimens. This test is for in vitro diagnostic use under the FDA EUA for laboratories certified under CLIA to perform high complexity testing. This test has not been FDA cleared or approved. A negative result does not rule out the presence of PCR inhibitors in the specimen or target RNA concentration below the limit of detection for the assay. The possibility of a false negative should be considered if the patient's recent exposure or clinical presentation suggests COVID-19. This test was validated by the [facility]. This laboratory is certified under the Clinical Laboratory Improvement Amendments (CLIA) as qualified to perform high complexity laboratory testing.               MD Lorena Cortez Nikola, MD  07/13/24 1527

## 2024-07-13 NOTE — TELEPHONE ENCOUNTER
[place]: [place]  Access Inpatient Bed Call Log 7/13/24 @3:10 PM:     Intake has called facilities that have not updated the bed status within the last 12 hours.                                 [facility] is posting 0 beds.           [facility] is posting 8 beds. [phone];    Per call with Amanda @5:57 PM Pt declined and is too acute with a hx of aggression   [facility] is posting 0 beds. Negative covid required    [facility] is posting 0 beds. Neg covid. No high school/[facility]-psych. [phone]. per call at 7:07 am to Ester, they are at cap for the day.    [facility] is posting 0 beds. [phone]    [facility] is posting 0 beds. [phone]     [facility] is posting 0 beds. Ages 18-35. Negative covid. [phone]. Per call at 7:08 am, left vm asking for a call back re: bed avail.    [facility] is posting 0 beds.     [facility] is posting 0 beds [phone]         Pt remains on the work list pending appropriate bed availability.

## 2024-07-13 NOTE — CONSULTS
"      Natalio Rodas MRN# [id number]   Age: 28 year old YOB: 1995   Date of Admission to ED: 7/12/2024    In person visit Details:     Patient was assessed and interviewed face-to-face in person with this writer justo. Patient was observed to be able to participate in the assessment as evidenced by verbal consent. Assessment methods included conducting a formal interview with patient, review of medical records, collaboration with medical staff, and obtaining relevant collateral information from family and community providers when available.        Reason for Consult:   This note is being entered to supplement the psychiatry consultation note that was completed on July 12, 2024 by the licensed mental health professional Marie Diaz, LP have reviewed the pertinent clinical details related to their encounter. I am being consulted to offer additional guidance on psychiatric pharmacological interventions      I met Natalio in the consult room face-to-face by himself very pleasant and cooperative during assessment and interview.  Currently denies suicidal ideation or homicidal ideation or self-injury behavior.  Natalio told me he attempted suicide on July 11, 2024 by overdosing himself with a bunch of ibuprofen.  Natalio told me currently here voices once in a while sometimes he said \" birds are talking to me, squirrel start talking to me, I am not sure if it is real or imaginary yes I hear voices.\"  Natalio denied any voice command to hurt himself or to hurt other people's.  Currently he told me he rather not be with his father or his family as he said \" they are very negative toward me.\"  Patient told me he had multiple runs into with law, and he told me he had multiple civil commitment previously due to current suicidal attempt and suicidal ideation he said he rather be inpatient mental health unit.  Also Natalio told me he used to be on Abilify up to 15 mg but after working with his " outpatient psychiatry for long time he stopped taking his medication.  Also while he was on Abilify he used to be on lithium.  Currently Natalio is voluntary for inpatient mental health unit and he want to continue to take Zyprexa 5 mg at bedtime.  Currently patient is voluntary for inpatient mental health unit if he attempt to leave AGAINST MEDICAL ADVICE please reassess    There is genetic loading for none known.  Medical history does not appear to be significant.  Substance use does not appear to be playing a contributing role in the patient's presentation.  Patient appears to cope with stress/frustration/emotion by withdrawing.  Stressors include chronic mental health issues, school issues, peer issues, and family dynamics.     I have reviewed the nursing notes. I have reviewed the findings, diagnosis, plan and need for follow up with the patient.         HPI:        Natalio Rodas is a 28 year old male with history of schizoaffective disorder- bipolar type, marijuana use, prior episodes of insomnia and irritability who presents to the emergency Department via EMS for agitation.  He had a court date set due to a prior incidence where he was aggressive.  He missed his court date today, became dysregulated and was threatening towards family.     Patient admits to acting out as he got mad that his keys were taken from him, and was feeling overwhelmed as everyone wanted him to do something such as take his meds, go to court, get an assessment. Patient admits ingesting 2.5 gm ibuprofen and 2.5 gm tylenol out of anger around noon. He feels he has a headache from taking those pills. He otherwise denies taking anything else including any prescribed meds. He smokes THC and cigarettes. He is under the impression that he is here to get assessed and treated.     Father per 's report, feels patient should be hospitalized, get committed and get back on meds as he has been aggressive at home, that the family is  afraid of him, and he appears psychotic.     Patient has been in emotional and behavioral control here.        Pt has not required locked seclusion or restraints in the past 24 hours to maintain safety, please refer to RN documentation for further details.  Substance use does  appear to be playing a contributing role in the patient's presentation.  Brief Therapeutic Intervention(s):   Provided active listening, unconditional positive regard, and validation. Engaged in cognitive restructuring/ reframing, looked at common cognitive distortions and challenged negative thoughts. Engaged in guided discovery, explored patient's perspectives and helped expand them through socratic dialogue. Provided positive reinforcement for progress towards goals, gains in knowledge, and application of skills previously taught.  Engaged in social skills training. Explored and identified early warning signs to anger        Past Psychiatric History:     See DEC  note        Substance Use and History:     See DEC  note        Past Medical History:   PAST MEDICAL HISTORY:   Past Medical History:   Diagnosis Date    ADHD     Concussion     Oppositional defiant disorder     Paranoid schizophrenia (H)     Schizoaffective disorder, bipolar type (H)     Schizophrenia (H)        PAST SURGICAL HISTORY:   Past Surgical History:   Procedure Laterality Date    DENTAL SURGERY      wisdom teeth    NOSE SURGERY  08/2019    injury    OPEN REDUCTION INTERNAL FIXATION ELBOW Right 11/5/2019    Procedure: OPEN REDUCTION INTERNAL FIXATION right olecranon fracture;  Surgeon: Vahid Goncalves MD;  Location:  OR               Allergies:     Allergies   Allergen Reactions    Nsaids      PT CAN NOT TAKE WITH LITHIUM              Medications:   I have reviewed this patient's current medications  Current Facility-Administered Medications   Medication Dose Route Frequency Provider Last Rate Last Admin    nicotine (NICORETTE) gum 2 mg  2 mg  Buccal Q1H PRN Ezio Vega, DO   2 mg at 07/13/24 1039    OLANZapine zydis (zyPREXA) ODT tab 5 mg  5 mg Oral BID PRN Miguel Martinez MD   5 mg at 07/12/24 1801    Or    OLANZapine (zyPREXA) injection 10 mg  10 mg Intramuscular BID PRN Miguel Martinez MD        OLANZapine zydis (zyPREXA) ODT tab 5 mg  5 mg Oral At Bedtime Miguel Martinez MD         Current Outpatient Medications   Medication Sig Dispense Refill    acetaminophen (TYLENOL) 325 MG tablet Take 2 tablets (650 mg) by mouth every 4 hours as needed for mild pain 50 tablet 0    ibuprofen (ADVIL/MOTRIN) 200 MG tablet Take 200 mg by mouth every 4 hours as needed for pain                Family History:   FAMILY HISTORY:   Family History   Problem Relation Age of Onset    Substance Abuse Paternal Grandfather     Substance Abuse Brother     Substance Abuse Paternal Uncle     Substance Abuse Other     Anesthesia Reaction No family hx of     Cardiovascular No family hx of               Social History:   Upbringing: born and raised   Educational History:   Relationships:       - Collateral information from the famly/friend: none         PTA Medications:   (Not in a hospital admission)         Allergies:     Allergies   Allergen Reactions    Nsaids      PT CAN NOT TAKE WITH LITHIUM           Labs:     Recent Results (from the past 48 hour(s))   Comprehensive metabolic panel    Collection Time: 07/12/24  4:04 PM   Result Value Ref Range    Sodium 138 135 - 145 mmol/L    Potassium 3.7 3.4 - 5.3 mmol/L    Carbon Dioxide (CO2) 24 22 - 29 mmol/L    Anion Gap 12 7 - 15 mmol/L    Urea Nitrogen 10.6 6.0 - 20.0 mg/dL    Creatinine 1.02 0.67 - 1.17 mg/dL    GFR Estimate >90 >60 mL/min/1.73m2    Calcium 9.7 8.6 - 10.0 mg/dL    Chloride 102 98 - 107 mmol/L    Glucose 100 (H) 70 - 99 mg/dL    Alkaline Phosphatase 83 40 - 150 U/L    AST 27 0 - 45 U/L    ALT 17 0 - 70 U/L    Protein Total 7.6 6.4 - 8.3 g/dL    Albumin 4.8 3.5 - 5.2 g/dL    Bilirubin Total  0.5 <=1.2 mg/dL   TSH with free T4 reflex    Collection Time: 07/12/24  4:04 PM   Result Value Ref Range    TSH 0.66 0.30 - 4.20 uIU/mL   Salicylate level    Collection Time: 07/12/24  4:04 PM   Result Value Ref Range    Salicylate <0.3   mg/dL   Acetaminophen level    Collection Time: 07/12/24  4:04 PM   Result Value Ref Range    Acetaminophen 25.7 10.0 - 30.0 ug/mL   CBC with platelets and differential    Collection Time: 07/12/24  4:04 PM   Result Value Ref Range    WBC Count 7.9 4.0 - 11.0 10e3/uL    RBC Count 5.27 4.40 - 5.90 10e6/uL    Hemoglobin 16.0 13.3 - 17.7 g/dL    Hematocrit 48.4 40.0 - 53.0 %    MCV 92 78 - 100 fL    MCH 30.4 26.5 - 33.0 pg    MCHC 33.1 31.5 - 36.5 g/dL    RDW 13.0 10.0 - 15.0 %    Platelet Count 279 150 - 450 10e3/uL    % Neutrophils 74 %    % Lymphocytes 19 %    % Monocytes 5 %    % Eosinophils 1 %    % Basophils 1 %    % Immature Granulocytes 0 %    NRBCs per 100 WBC 0 <1 /100    Absolute Neutrophils 5.9 1.6 - 8.3 10e3/uL    Absolute Lymphocytes 1.5 0.8 - 5.3 10e3/uL    Absolute Monocytes 0.4 0.0 - 1.3 10e3/uL    Absolute Eosinophils 0.1 0.0 - 0.7 10e3/uL    Absolute Basophils 0.1 0.0 - 0.2 10e3/uL    Absolute Immature Granulocytes 0.0 <=0.4 10e3/uL    Absolute NRBCs 0.0 10e3/uL   Asymptomatic COVID-19 Virus (Coronavirus) by PCR Nose    Collection Time: 07/12/24  4:06 PM    Specimen: Nose; Swab   Result Value Ref Range    SARS CoV2 PCR Negative Negative          Physical and Psychiatric Examination:     /85   Pulse 60   Temp 97.7  F (36.5  C) (Oral)   Resp 18   SpO2 100%   Weight is 0 lbs 0 oz  There is no height or weight on file to calculate BMI.    Mental Status Exam:  Appearance: awake, alert  Attitude:  cooperative  Eye Contact:  good  Mood:  anxious, sad , and depressed  Affect:  appropriate and in normal range  Speech:  clear, coherent  Language: fluent and intact in English  Psychomotor, Gait, Musculoskeletal:  no evidence of tardive dyskinesia, dystonia, or  tics  Thought Process:  logical, linear, and goal oriented  Associations:  no loose associations  Thought Content:  passive suicidal ideation present, auditory hallucinations present, and visual hallucinations present  Insight:  limited  Judgement:  limited  Oriented to:  time, person, and place  Attention Span and Concentration:  limited  Recent and Remote Memory:  limited  Fund of Knowledge:  low-normal         Diagnoses:      Aggressive behavior  Overdose by ingestion  History of schizoaffective disorder         Recommendations:         1. Pt displays the following risk factors that support IP admission: Due to current suicidal attempt and suicidal ideation, severe agitation and aggression toward his family, unable to contract for safety. Pt is unable to engage in safety planning to mitigate risk level in a non-secure setting. Lower levels of care have not been successful in mitigating risk. Due to this IP is the least restrictive option of care for pt. Pt should remain in IP until deemed safe to return to the community and engage in OP  supports    - Continue to recommend inpatient psychiatric hospitalizations for further stabilization   2.  Currently patient is voluntary for inpatient mental health unit if he asked to leave the hospital AGAINST MEDICAL ADVICE please reassess or place 72-hour hold  2.  Continue Zyprexa 5 mg at bedtime  Zyprexa 5 mg po twice daily as needed for severe agitation and aggression    4.  Consult psychiatry as needed  4.   Refer to psychiatric provider for medication management. *   treatment per ED team    - Consulted with [facility]  licensed mental health professional, ED physician asked if they would like this writer to enter orders in the EHR,  patient's ED RN regarding this case.    Please call [facility]/DEC at [phone] if you have follow-up questions or wish to place another consult.  Darryl Valle, Psychiatric Nurse practitioner    Attestation:  Time with:  Patient: 30  minutes  Treatment Team: 30 Minutes  Chart Review: 30 minutes    Total time spent was 90 minutes. Over 50% of times was spent counseling and coordination of care.    I thank  primary ED provider and  Extended care team very much for letting me participate in the care of this patient.    I, Darryl Valle, CNP, APRN, Psychiatric Nurse Practitioner have personally performed an examination of this patient.  I have edited the note to reflect all relevant changes.  I have discussed this patient with the care team  July 13, 024.  I have reviewed all vitals and laboratory findings.    Disclaimer: This note consists of symbols derived from keyboarding,

## 2024-07-13 NOTE — TELEPHONE ENCOUNTER
[place]: [place]  Access Inpatient Bed Call Log 7/13/2024 3:00 PM   Intake has called facilities that have not updated their bed status within the last 12 hours.??         [facility]   [place]-- [facility]: @ cap per website.   [place]-- [facility]:  Posting 8 beds. [phone] ECT Tx Patients need to go through APS.   [place]-- [facility]: Posting 0 beds. ECT Tx offered.        [place]-- [facility]:  Posting 0 beds.   [facility]: Posting 0 beds. [phone] ECT Tx offered.   [place] -- [facility] @ Cap per website.  [place] -- [facility] 0 beds. [phone]     [place] -- [facility]: @ cap per website.   [place] -- [facility]:  Posting 0 beds. Low acuity only. [phone]      Pt remains on worklist pending appropriate bed availability.

## 2024-07-13 NOTE — ED NOTES
Up x1 briefly to restroom. Otherwise, Patient observed to be sleeping on all other safety checks at [facility]. Respirations even and unlabored. No visible s/s of pain distress/discomfort. No behavior issues this shift. Staff to continue to assess/monitor.

## 2024-07-13 NOTE — ED NOTES
Patient was calm and cooperative during the shift. Patient denied pain, SI/HI/SIB. Patient contracted for safety. Patient was medication compliant. He ate breakfast and lunch. Patient stated he slept well last night. No major concerns during the morning shift. Will continue to monitor for behaviors.

## 2024-07-14 ENCOUNTER — TELEPHONE (OUTPATIENT)
Dept: BEHAVIORAL HEALTH | Facility: CLINIC | Age: 29
End: 2024-07-14
Payer: MEDICAID

## 2024-07-14 PROCEDURE — 250N000013 HC RX MED GY IP 250 OP 250 PS 637: Performed by: PSYCHIATRY & NEUROLOGY

## 2024-07-14 RX ORDER — QUETIAPINE FUMARATE 25 MG/1
50 TABLET, FILM COATED ORAL 2 TIMES DAILY
Status: DISCONTINUED | OUTPATIENT
Start: 2024-07-14 | End: 2024-07-15

## 2024-07-14 RX ORDER — HYDROXYZINE HYDROCHLORIDE 25 MG/1
25 TABLET, FILM COATED ORAL 3 TIMES DAILY PRN
Status: DISCONTINUED | OUTPATIENT
Start: 2024-07-14 | End: 2024-07-17

## 2024-07-14 RX ADMIN — OLANZAPINE 5 MG: 5 TABLET, ORALLY DISINTEGRATING ORAL at 20:06

## 2024-07-14 NOTE — ED PROVIDER NOTES
[facility] ED Mental Health Handoff Note:       Brief HPI:  This is a 28 year old male signed out to me by Dr. Hurst.  See initial ED Provider note for full details of the presentation. Interval history is pertinent for no acute issues.  Patient is calm and cooperative.  Patient is being admitted on a voluntary basis for paranoid behavior.  Patient known history of schizoaffective disorder..    Home meds reviewed and ordered/administered: Yes    Medically stable for inpatient mental health admission: Yes.    Evaluated by mental health: Yes. The recommendation is for inpatient mental health treatment. Bed search in process    Safety concerns: At the time I received sign out, there were no safety concerns.    Hold Status:  Active Orders   N/A            Exam:   No data found.        ED Course:    Medications   OLANZapine zydis (zyPREXA) ODT tab 5 mg (5 mg Oral $Given 7/13/24 2123)   OLANZapine zydis (zyPREXA) ODT tab 5 mg (5 mg Oral $Given 7/12/24 1801)     Or   OLANZapine (zyPREXA) injection 10 mg ( Intramuscular See Alternative 7/12/24 1801)   nicotine (NICORETTE) gum 2 mg (2 mg Buccal $Given 7/13/24 1039)            There were no significant events during my shift.    Patient was signed out to the oncoming provider.    Impression:    ICD-10-CM    1. Aggressive behavior  R46.89       2. Overdose by ingestion  T50.901A       3. History of schizoaffective disorder  Z86.59           Plan:    Awaiting inpatient mental health admission/transfer.      RESULTS:   No results found for this visit on 07/12/24 (from the past 24 hour(s)).          MD Angelito De La Torre Kruti Tripathi, MD  07/14/24 2708

## 2024-07-14 NOTE — ED NOTES
I was called regarding this patient who had been fairly stable back on his Zyprexa at bedtime but today apparently started to become agitated and drink his own urine.  At this time instead of going with the as needed Zyprexa as during the day the patient will be started on some Seroquel in addition to his bedtime Zyprexa.    Hydroxyzine was added to PRNs along with the Zyprexa that had been already ordered in his chart.    Behavioral and psychiatry consults were reviewed      Sidney Berger MD, MD Berger, Sidney Tello MD  07/14/24 6102

## 2024-07-14 NOTE — ED NOTES
Patient became irritated this morning.  Another patient was talking about the president and he got upset and stated he does not want to hear about it. He be agitated/irritated. Write offered him a PRN and he refused. He requested to take a shower. Ray was able to get staff for him to take a shower. He became calm and happy about the shower. He currently denied pain, SI/HI/SIB. He contacted for safety. Will continue to monitor for behaviors.

## 2024-07-14 NOTE — ED NOTES
Uneventful night. Observed reading in room at the beginning of the shift. No behavior concern. Safety precaution in place.. Otherwise, Appeared to be asleep on all safety checks after 0130. No complaint of pain distress/discomfort.

## 2024-07-14 NOTE — ED NOTES
Up in milieu on early rounds ; watching movies w/ peers. Not verbally interacting. Ate well. Isolating to his room much of the evening reading a passage in a book. Irritable and grumpy . Was able to encourage him to take his HS med.

## 2024-07-14 NOTE — TELEPHONE ENCOUNTER
R: [place]  Access Inpatient Bed Call Log 7/14/24  @3:10 PM:    Intake has called facilities that have not updated the bed status within the last 12 hours.                                   [facility] is posting 0 beds.           [facility] is posting 8 beds. [phone];  per call at 7 am to [facility], they are at cap.    [facility] is posting 0 beds. Negative covid required    [facility] is posting 0 beds. Neg covid. No high school/Geraldine-psych. [phone].  per call at 7:02 am to Kirsten, they are at cap.    [facility] is posting 0 beds. [phone]    [facility] is posting 0 beds. [phone]     [facility] is posting 0 beds. Ages 18-35. Negative covid. [phone]. Per call at 7:03 am, left vm asking for a call back re: bed availability.    [facility] is posting 0 beds.     [facility] is posting 0 beds [phone]      Pt remains on the work list pending appropriate bed availability.

## 2024-07-14 NOTE — ED NOTES
Pt in much better spirits this pm and is cooperative w/ care. 1600 It is reported to this nurse by the  that pt was observed Voiding into a cup in his room then drank the urine. Dr. DEMETRIS Hardy. Notified and will review.

## 2024-07-14 NOTE — TELEPHONE ENCOUNTER
R: [place]  Access Inpatient Bed Call Log 7/14/24 @ 7:00 am:  ([facility]):  Intake has called facilities that have not updated the bed status within the last 12 hours.                                 [facility] is posting 0 beds.           [facility] is posting 8 beds. [phone]; per call at 7 am to [facility], they are at cap.    [facility] is posting 0 beds. Negative covid required    [facility] is posting 0 beds. Neg covid. No high school/Geraldine-psych. [phone]: per call at 7:02 am to Kirsten, they are at cap.    [facility] is posting 0 beds. [phone]    [facility] is posting 0 beds. [phone]     [facility] is posting 0 beds. Ages 18-35. Negative covid. [phone]. Per call at 7:03 am, left vm asking for a call back re: bed availability. Per call at 7:53 am to Jay[facility], they are at cap for y/a today.   [facility] is posting 0 beds.     [facility] is posting 0 beds [phone]      Pt remains on the work list pending appropriate bed availability.

## 2024-07-14 NOTE — TELEPHONE ENCOUNTER
R:  [place]  Access Inpatient Bed Call Log 7/13/24 @ 11:45PM  Intake has called facilities that have not updated their bed status within the last 12 hours.    [facility]     [facility]: @ cap  [facility]: Posting 8 beds.  Per Jaylen @ 11:50PM, they are full. Declined 7/13 d/t acuity and hx of aggression   [facility]: @ cap per website   [facility]: @ cap per website   [facility]: @ cap per website   [facility]: @ [facility] per website   [facility]: @ cap per website (Young Adult unit: No recent aggressions, violence or sexual assault. Neg covid required).  Per Jamal @ 12:24AM, no YA beds  [facility]: @ cap per website   [facility]: @ cap per website   [facility]: @ cap per website     Pt remains on work list until appropriate placement is available

## 2024-07-15 ENCOUNTER — TELEPHONE (OUTPATIENT)
Dept: BEHAVIORAL HEALTH | Facility: CLINIC | Age: 29
End: 2024-07-15
Payer: MEDICAID

## 2024-07-15 PROCEDURE — 250N000013 HC RX MED GY IP 250 OP 250 PS 637: Performed by: EMERGENCY MEDICINE

## 2024-07-15 PROCEDURE — 80307 DRUG TEST PRSMV CHEM ANLYZR: CPT | Performed by: PSYCHIATRY & NEUROLOGY

## 2024-07-15 PROCEDURE — 99417 PROLNG OP E/M EACH 15 MIN: CPT

## 2024-07-15 PROCEDURE — 250N000013 HC RX MED GY IP 250 OP 250 PS 637: Performed by: PSYCHIATRY & NEUROLOGY

## 2024-07-15 PROCEDURE — 99245 OFF/OP CONSLTJ NEW/EST HI 55: CPT

## 2024-07-15 PROCEDURE — 250N000013 HC RX MED GY IP 250 OP 250 PS 637

## 2024-07-15 RX ORDER — LITHIUM CARBONATE 450 MG
450 TABLET, EXTENDED RELEASE ORAL AT BEDTIME
Status: DISCONTINUED | OUTPATIENT
Start: 2024-07-15 | End: 2024-07-17

## 2024-07-15 RX ADMIN — NICOTINE POLACRILEX 2 MG: 2 GUM, CHEWING BUCCAL at 09:23

## 2024-07-15 RX ADMIN — LITHIUM CARBONATE 450 MG: 450 TABLET, EXTENDED RELEASE ORAL at 20:47

## 2024-07-15 RX ADMIN — OLANZAPINE 5 MG: 5 TABLET, ORALLY DISINTEGRATING ORAL at 20:45

## 2024-07-15 NOTE — ED PROVIDER NOTES
[facility] ED Mental Health Handoff Note:       Brief HPI:  This is a 28 year old male signed out to me by Dr. Gardner.  See initial ED Provider note for full details of the presentation. Interval history is pertinent for no reported events or changes.    Home meds reviewed and ordered/administered: Yes    Medically stable for inpatient mental health admission: Yes.    Evaluated by mental health: Yes. The recommendation is for inpatient mental health treatment. Bed search in process    Safety concerns: At the time I received sign out, there were no safety concerns.    Hold Status:  Active Orders   N/A            Exam:   Patient Vitals for the past 24 hrs:   BP Temp Temp src Pulse Resp SpO2   07/15/24 0831 131/77 98  F (36.7  C) Oral (!) 49 18 100 %       General: Patient is in no acute distress and is resting comfortably.  HEENT: Normocephalic atraumatic  Neck: Supple  Cardiovascular: Heart rate normal  Pulmonary: Patient is in no respiratory distress  Extremities: No signs of any significant or life-threatening trauma.  Neurologic: No new focal neurologic deficits.      ED Course:    Medications   OLANZapine zydis (zyPREXA) ODT tab 5 mg (5 mg Oral Not Given 7/14/24 2315)   OLANZapine zydis (zyPREXA) ODT tab 5 mg (5 mg Oral $Given 7/12/24 1801)     Or   OLANZapine (zyPREXA) injection 10 mg ( Intramuscular See Alternative 7/12/24 1801)   nicotine (NICORETTE) gum 2 mg (2 mg Buccal $Given 7/15/24 0923)   hydrOXYzine HCl (ATARAX) tablet 25 mg (has no administration in time range)   lithium (ESKALITH CR/LITHOBID) CR tablet 450 mg (has no administration in time range)            There were no significant events during my shift.    Patient was signed out to the oncoming provider      Impression:    ICD-10-CM    1. Aggressive behavior  R46.89       2. Overdose by ingestion  T50.901A       3. History of schizoaffective disorder  Z86.59           Plan:    Awaiting inpatient mental health admission/transfer.      RESULTS:    Results for orders placed or performed during the hospital encounter of 07/12/24 (from the past 24 hour(s))   Urine Drug Screen     Status: Abnormal    Collection Time: 07/15/24  6:31 AM    Narrative    The following orders were created for panel order Urine Drug Screen.  Procedure                               Abnormality         Status                     ---------                               -----------         ------                     Urine Drug Screen Panel[[id number]]      Abnormal            Final result                 Please view results for these tests on the individual orders.   Urine Drug Screen Panel     Status: Abnormal    Collection Time: 07/15/24  6:31 AM   Result Value Ref Range    Amphetamines Urine Screen Negative Screen Negative    Barbituates Urine Screen Negative Screen Negative    Benzodiazepine Urine Screen Negative Screen Negative    Cannabinoids Urine Screen Positive (A) Screen Negative    Cocaine Urine Screen Negative Screen Negative    Fentanyl Qual Urine Screen Negative Screen Negative    Opiates Urine Screen Negative Screen Negative    PCP Urine Screen Negative Screen Negative             MD Holland Roth David, MD  07/15/24 2310

## 2024-07-15 NOTE — ED NOTES
Patient was irritated this morning. He refused his scheduled Seroquel. Patient stated nobody talked to him about it. Writer informed MD TONY came to speak to patient about the medication. He agreed to take a different med. He currently denied pain, SI/HI/SIB, Patient contracted for safety. Will continue to monitor for behaviors.

## 2024-07-15 NOTE — TELEPHONE ENCOUNTER
R:  [place]  Access Inpatient Bed Call Log 7/14/24 @ 11:35PM  Intake has called facilities that have not updated their bed status within the last 12 hours.    [facility]     [facility] cap  [facility]: Posting 3 beds. Per Edith @ 11:35PM, they are at capacity  [facility]: @ cap per website   [facility]: @ cap per website.  Per [place] @ 11:36PM, they are full tonight  [facility]: @ cap per website   [facility]: @ cap per website   [facility]: @ cap per website (Young Adult unit: No recent aggressions, violence or sexual assault. Neg covid required). Per Marcia @ 12:04AM, no YA beds    [facility]: @ cap per website   [facility]: @ cap per website   [facility]: @ cap per website     Pt remains on work list until appropriate placement is available

## 2024-07-15 NOTE — TELEPHONE ENCOUNTER
S:  Pt is voluntary and wants [facility] only.    S: [place] Access Inpatient Bed Call Log 7/15/24  @ 9563  Intake has called facilities that have not updated the bed status within the last 12 hours.                                 [facility] is posting 0 beds.           [facility] is posting  3  beds. [phone];  per call @  0954   at [facility] is posting 0 beds. Negative covid required    [facility] is posting 0 beds. Neg covid. No high school/Geraldine-psych. [phone].      [facility] is posting 0 beds. [phone]    [facility] is posting 0 beds. [phone]     [facility] is posting 0 beds. Ages 18-35. Negative covid. [phone].     [facility] is posting 0 beds.     [facility] is posting 0 beds [phone]      Continue to seek placement.

## 2024-07-15 NOTE — CONSULTS
Natalio Rodas MRN# [id number]   Age: 28 year old YOB: 1995   Date of Admission to ED: 7/12/2024    In person visit Details:     Patient was assessed and interviewed face-to-face in person with this writer justo. Patient was observed to be able to participate in the assessment as evidenced by verbal consent. Assessment methods included conducting a formal interview with patient, review of medical records, collaboration with medical staff, and obtaining relevant collateral information from family and community providers when available.        Reason for Consult:   This note is being entered to supplement the psychiatry consultation note that was completed on July 12, 2024 by the licensed mental health professional Marie Diaz, LP have reviewed the pertinent clinical details related to their encounter. I am being consulted to offer additional guidance on psychiatric pharmacological interventions       I met patient in his room face-to-face by himself he was very pleasant and cooperative during assessment and interview.  Currently patient denied suicidal ideation or homicidal ideation and self-injury behavior.  Patient is very disorganized sometimes currently alert and oriented x 4.  Per chart review patient yesterday drink he is own urine, can be agitated very easily, Dr. Berger for the started him on Seroquel 50 mg twice daily due to his current psychosis on July 14, 2024 patient continued to refuse and very agitated about Seroquel saying that nobody discussed with him.  I discontinued Seroquel per patient request patient asked for mood stabilizer specifically naming the lithium, I started him on lithium 450 mg at bedtime patient continues to deny for me to increase his current Zyprexa from 5 mg.  Patient currently voluntary for inpatient mental health unit if he asked to leave the hospital AGAINST MEDICAL ADVICE please reassess or place 72-hour hold.    There is genetic loading for none   known.  Medical history does not appear to be significant.  Substance use does not appear to be playing a contributing role in the patient's presentation.  Patient appears to cope with stress/frustration/emotion by withdrawing.  Stressors include chronic mental health issues, school issues, peer issues, and family dynamics.      I have reviewed the nursing notes. I have reviewed the findings, diagnosis, plan and need for follow up with the patient.         HPI:      Natalio Rodas is a 28 year old male with history of schizoaffective disorder- bipolar type, marijuana use, prior episodes of insomnia and irritability who presents to the emergency Department via EMS for agitation.  He had a court date set due to a prior incidence where he was aggressive.  He missed his court date today, became dysregulated and was threatening towards family.     Patient admits to acting out as he got mad that his keys were taken from him, and was feeling overwhelmed as everyone wanted him to do something such as take his meds, go to court, get an assessment. Patient admits ingesting 2.5 gm ibuprofen and 2.5 gm tylenol out of anger around noon. He feels he has a headache from taking those pills. He otherwise denies taking anything else including any prescribed meds. He smokes THC and cigarettes. He is under the impression that he is here to get assessed and treated.     Father per 's report, feels patient should be hospitalized, get committed and get back on meds as he has been aggressive at home, that the family is afraid of him, and he appears psychotic.     Patient has been in emotional and behavioral control here.           Pt has not required locked seclusion or restraints in the past 24 hours to maintain safety, please refer to RN documentation for further details.  Substance use does not appear to be playing a contributing role in the patient's presentation.  Brief Therapeutic Intervention(s):   Provided active  listening, unconditional positive regard, and validation. Engaged in cognitive restructuring/ reframing, looked at common cognitive distortions and challenged negative thoughts. Engaged in guided discovery, explored patient's perspectives and helped expand them through socratic dialogue. Provided positive reinforcement for progress towards goals, gains in knowledge, and application of skills previously taught.  Engaged in social skills training. Explored and identified early warning signs to anger.        Past Psychiatric History:     See DEC  note        Substance Use and History:     See DEC  note        Past Medical History:   PAST MEDICAL HISTORY:   Past Medical History:   Diagnosis Date    ADHD     Concussion     Oppositional defiant disorder     Paranoid schizophrenia (H)     Schizoaffective disorder, bipolar type (H)     Schizophrenia (H)        PAST SURGICAL HISTORY:   Past Surgical History:   Procedure Laterality Date    DENTAL SURGERY      wisdom teeth    NOSE SURGERY  08/2019    injury    OPEN REDUCTION INTERNAL FIXATION ELBOW Right 11/5/2019    Procedure: OPEN REDUCTION INTERNAL FIXATION right olecranon fracture;  Surgeon: Vahid Goncalves MD;  [facility]               Allergies:     Allergies   Allergen Reactions    Nsaids      PT CAN NOT TAKE WITH LITHIUM              Medications:   I have reviewed this patient's current medications  Current Facility-Administered Medications   Medication Dose Route Frequency Provider Last Rate Last Admin    hydrOXYzine HCl (ATARAX) tablet 25 mg  25 mg Oral TID PRN Sidney Berger MD        lithium (ESKALITH CR/LITHOBID) CR tablet 450 mg  450 mg Oral At Bedtime Darryl Valle, APRN CNP        nicotine (NICORETTE) gum 2 mg  2 mg Buccal Q1H PRN Ezio Vega DO   2 mg at 07/15/24 0923    OLANZapine zydis (zyPREXA) ODT tab 5 mg  5 mg Oral BID PRN Miguel Martinez MD   5 mg at 07/12/24 1801    Or    OLANZapine (zyPREXA)  injection 10 mg  10 mg Intramuscular BID PRN Miguel Martinez MD        OLANZapine zydis (zyPREXA) ODT tab 5 mg  5 mg Oral At Bedtime Miguel Martinez MD   5 mg at 07/14/24 2006     Current Outpatient Medications   Medication Sig Dispense Refill    acetaminophen (TYLENOL) 325 MG tablet Take 2 tablets (650 mg) by mouth every 4 hours as needed for mild pain 50 tablet 0    ibuprofen (ADVIL/MOTRIN) 200 MG tablet Take 200 mg by mouth every 4 hours as needed for pain                Family History:   FAMILY HISTORY:   Family History   Problem Relation Age of Onset    Substance Abuse Paternal Grandfather     Substance Abuse Brother     Substance Abuse Paternal Uncle     Substance Abuse Other     Anesthesia Reaction No family hx of     Cardiovascular No family hx of               Social History:   Upbringing: born and raised   Educational History:   Relationships:  Children: **   Current Living Situation:        - Collateral information from the famly/friend: none         PTA Medications:   (Not in a hospital admission)         Allergies:     Allergies   Allergen Reactions    Nsaids      PT CAN NOT TAKE WITH LITHIUM           Labs:     Recent Results (from the past 48 hour(s))   Urine Drug Screen Panel    Collection Time: 07/15/24  6:31 AM   Result Value Ref Range    Amphetamines Urine Screen Negative Screen Negative    Barbituates Urine Screen Negative Screen Negative    Benzodiazepine Urine Screen Negative Screen Negative    Cannabinoids Urine Screen Positive (A) Screen Negative    Cocaine Urine Screen Negative Screen Negative    Fentanyl Qual Urine Screen Negative Screen Negative    Opiates Urine Screen Negative Screen Negative    PCP Urine Screen Negative Screen Negative          Physical and Psychiatric Examination:     /77   Pulse (!) 49   Temp 98  F (36.7  C) (Oral)   Resp 18   SpO2 100%   Weight is 0 lbs 0 oz  There is no height or weight on file to calculate BMI.    Mental Status Exam:  Appearance:  awake, alert  Attitude:  cooperative  Eye Contact:  intense  Mood:  angry and anxious  Affect:  appropriate and in normal range  Speech:  clear, coherent  Language: fluent and intact in English  Psychomotor, Gait, Musculoskeletal:  no evidence of tardive dyskinesia, dystonia, or tics  Thought Process:  logical and linear  Associations:  no loose associations  Thought Content:  no evidence of suicidal ideation or homicidal ideation  Insight:  limited  Judgement:  limited  Oriented to:  time, person, and place  Attention Span and Concentration:  limited  Recent and Remote Memory:  limited  Fund of Knowledge:  appropriate         Diagnoses:      Aggressive behavior  Overdose by ingestion  History of schizoaffective disorder         Recommendations:     1. Pt displays the following risk factors that support IP admission: Due to current suicidal attempt and suicidal ideation, severe agitation and aggression toward his family, unable to contract for safety. Pt is unable to engage in safety planning to mitigate risk level in a non-secure setting. Lower levels of care have not been successful in mitigating risk. Due to this IP is the least restrictive option of care for pt. Pt should remain in IP until deemed safe to return to the community and engage in OP  supports     - Continue to recommend inpatient psychiatric hospitalizations for further stabilization   2.  Currently patient is voluntary for inpatient mental health unit if he asked to leave the hospital AGAINST MEDICAL ADVICE please reassess or place 72-hour hold  2.  Continue Zyprexa 5 mg at bedtime, start patient on 450 mg lithium at bedtime for his current paranoid ideation and mood stabilization.  Zyprexa 5 mg po twice daily as needed for severe agitation and aggression     4.  Consult psychiatry as needed  4.   Refer to psychiatric provider for medication management. *   treatment per ED team    - Consulted with [facility]  licensed mental health  professional, ED physician asked if they would like this writer to enter orders in the EHR,  patient's ED RN regarding this case.    Please call [facility]/DEC at [phone] if you have follow-up questions or wish to place another consult.  Darryl Valle, Psychiatric Nurse practitioner    Attestation:  Time with:  Patient: 30 minutes  Treatment Team: 25 Minutes  Chart Review: 30 minutes    Total time spent was 85 minutes. Over 50% of times was spent counseling and coordination of care.    I thank  primary ED provider and extended* care team very much for letting me participate in the care of this patient.    I, Darryl Valle, CNP, APRN, Psychiatric Nurse Practitioner have personally performed an examination of this patient.  I have edited the note to reflect all relevant changes.  I have discussed this patient with the care team July 15, 2024.  I have reviewed all vitals and laboratory findings.    Disclaimer: This note consists of symbols derived from keyboarding,

## 2024-07-15 NOTE — ED NOTES
Pt generally calm and cooperative except refusing to take seroquel. Stated he wanted to discuss w/ MD before adding anything else.

## 2024-07-16 ENCOUNTER — TELEPHONE (OUTPATIENT)
Dept: BEHAVIORAL HEALTH | Facility: CLINIC | Age: 29
End: 2024-07-16
Payer: MEDICAID

## 2024-07-16 PROCEDURE — 250N000013 HC RX MED GY IP 250 OP 250 PS 637

## 2024-07-16 PROCEDURE — 250N000013 HC RX MED GY IP 250 OP 250 PS 637: Performed by: PSYCHIATRY & NEUROLOGY

## 2024-07-16 PROCEDURE — 99255 IP/OBS CONSLTJ NEW/EST HI 80: CPT

## 2024-07-16 RX ADMIN — OLANZAPINE 5 MG: 5 TABLET, ORALLY DISINTEGRATING ORAL at 21:54

## 2024-07-16 RX ADMIN — LITHIUM CARBONATE 450 MG: 450 TABLET, EXTENDED RELEASE ORAL at 21:55

## 2024-07-16 NOTE — TELEPHONE ENCOUNTER
R:   No current bed availability within [facility] .    Called [facility] @ 9:22AM and Kailash redirected to call back after 11am for Young Adult availability.     Intake called [facility] @ 11:06am.  Seun able to review.  Info gathered and faxed @ 11:20am    Awaiting review determination.        PPS informed Writer [facility] Called @ 51PM and declined pt for [facility] as pt needs high acuity bed, and do not have beds available.  Pt to remain on worklist for [facility] Bed availability

## 2024-07-16 NOTE — ED NOTES
Patient spent most of night shift [facility]iner [facility] stated that  medication doesn't work for him.Patient denies SI/HI/SIB vital stable.No behaviors.
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
## 2024-07-16 NOTE — ED NOTES
Patient  alert and orient x 4 calm and co-operative medications complaint.Denies SI/HI/SIB.Patient spend time with coloring and watching TV.Patient refuse to talk his mother had appetite good. No concerns noted.

## 2024-07-16 NOTE — TELEPHONE ENCOUNTER
1:50 PM Intake received a call from Idalia at  reporting this pt has been declined due to needing an MHICU bed.

## 2024-07-16 NOTE — ED NOTES
Patient spent the entire evening shift coloring  with peers in the milieu. He denied pain, SI/HI/SIB. Patient contracted for safety. Patient was medication compliant. No major concerns during the shift. Will continue to monitor for behaviors.

## 2024-07-16 NOTE — CONSULTS
Natalio Rodas MRN# [id number]   Age: 28 year old YOB: 1995   Date of Admission to ED: 7/12/2024    In person visit Details:     Patient was assessed and interviewed face-to-face in person with this writer justo. Patient was observed to be able to participate in the assessment as evidenced by verbal consent. Assessment methods included conducting a formal interview with patient, review of medical records, collaboration with medical staff, and obtaining relevant collateral information from family and community providers when available.        Reason for Consult:   This note is being entered to supplement the psychiatry consultation note that was completed on July 12, 2024 by the licensed mental health professional Marie Diaz, LP have reviewed the pertinent clinical details related to their encounter. I am being consulted to offer additional guidance on psychiatric pharmacological interventions     I met with Natalio in his room face-to-face by himself currently alert and oriented x 4 pleasant denied suicidal ideation or homicidal ideation or self-injury.  Natalio continues to take his medication as prescribed although currently he is on lithium taking 450 mg at bedtime and Zyprexa 5 mg, I explained to him why lithium proximal that was given at the same time.  Patient continues to ask different antipsychotic such as mentioning Abilify explained to ingest this problem Zyprexa and lithium need to give time before switching to a different antipsychotic..  Patient is currently voluntary for inpatient mental health unit due to agitation and aggression if he asked to leave the hospital AGAINST MEDICAL ADVICE need to reassess and place 72-hour hold.      There is genetic loading for none known.  Medical history does not appear to be significant.  Substance use does not* appear to be playing a contributing role in the patient's presentation.  Patient appears to cope with stress/frustration/emotion by  withdrawing.  Stressors include chronic mental health issues, school issues, peer issues, and family dynamics    I have reviewed the nursing notes. I have reviewed the findings, diagnosis, plan and need for follow up with the patient.         HPI:   Natalio Rodas is a 28 year old male with history of schizoaffective disorder- bipolar type, marijuana use, prior episodes of insomnia and irritability who presents to the emergency Department via EMS for agitation.  He had a court date set due to a prior incidence where he was aggressive.  He missed his court date today, became dysregulated and was threatening towards family.     Patient admits to acting out as he got mad that his keys were taken from him, and was feeling overwhelmed as everyone wanted him to do something such as take his meds, go to court, get an assessment. Patient admits ingesting 2.5 gm ibuprofen and 2.5 gm tylenol out of anger around noon. He feels he has a headache from taking those pills. He otherwise denies taking anything else including any prescribed meds. He smokes THC and cigarettes. He is under the impression that he is here to get assessed and treated.     Father per 's report, feels patient should be hospitalized, get committed and get back on meds as he has been aggressive at home, that the family is afraid of him, and he appears psychotic.     Patient has been in emotional and behavioral control here.         Pt has not required locked seclusion or restraints in the past 24 hours to maintain safety, please refer to RN documentation for further details.  Substance use does not appear to be playing a contributing role in the patient's presentation  Brief Therapeutic Intervention(s):   Provided active listening, unconditional positive regard, and validation. Engaged in cognitive restructuring/ reframing, looked at common cognitive distortions and challenged negative thoughts. Engaged in guided discovery, explored patient's  perspectives and helped expand them through socratic dialogue. Provided positive reinforcement for progress towards goals, gains in knowledge, and application of skills previously taught.  Engaged in social skills training. Explored and identified early warning signs to anger.        Past Psychiatric History:     See DEC  note        Substance Use and History:     See DEC  note        Past Medical History:   PAST MEDICAL HISTORY:   Past Medical History:   Diagnosis Date    ADHD     Concussion     Oppositional defiant disorder     Paranoid schizophrenia (H)     Schizoaffective disorder, bipolar type (H)     Schizophrenia (H)        PAST SURGICAL HISTORY:   Past Surgical History:   Procedure Laterality Date    DENTAL SURGERY      wisdom teeth    NOSE SURGERY  08/2019    injury    OPEN REDUCTION INTERNAL FIXATION ELBOW Right 11/5/2019    Procedure: OPEN REDUCTION INTERNAL FIXATION right olecranon fracture;  Surgeon: Vahid Goncalves MD;  Location: [facility] OR               Allergies:     Allergies   Allergen Reactions    Nsaids      PT CAN NOT TAKE WITH LITHIUM              Medications:   I have reviewed this patient's current medications  Current Facility-Administered Medications   Medication Dose Route Frequency Provider Last Rate Last Admin    hydrOXYzine HCl (ATARAX) tablet 25 mg  25 mg Oral TID PRN Sidney Berger MD        lithium (ESKALITH CR/LITHOBID) CR tablet 450 mg  450 mg Oral At Bedtime Darryl Valle APRN CNP   450 mg at 07/15/24 2047    nicotine (NICORETTE) gum 2 mg  2 mg Buccal Q1H PRN Ezio Vega DO   2 mg at 07/15/24 0923    OLANZapine zydis (zyPREXA) ODT tab 5 mg  5 mg Oral BID PRN Miguel Martinez MD   5 mg at 07/12/24 1801    Or    OLANZapine (zyPREXA) injection 10 mg  10 mg Intramuscular BID PRN Miguel Martinez MD        OLANZapine zydis (zyPREXA) ODT tab 5 mg  5 mg Oral At Bedtime Miguel Martinez MD   5 mg at 07/15/24 2045     Current Outpatient  Medications   Medication Sig Dispense Refill    acetaminophen (TYLENOL) 325 MG tablet Take 2 tablets (650 mg) by mouth every 4 hours as needed for mild pain 50 tablet 0    ibuprofen (ADVIL/MOTRIN) 200 MG tablet Take 200 mg by mouth every 4 hours as needed for pain                Family History:   FAMILY HISTORY:   Family History   Problem Relation Age of Onset    Substance Abuse Paternal Grandfather     Substance Abuse Brother     Substance Abuse Paternal Uncle     Substance Abuse Other     Anesthesia Reaction No family hx of     Cardiovascular No family hx of               Social History:   Upbringing: born and raised   Educational History:   Relationships:  Children:        - Collateral information from the famly/friend: none         PTA Medications:   (Not in a hospital admission)         Allergies:     Allergies   Allergen Reactions    Nsaids      PT CAN NOT TAKE WITH LITHIUM           Labs:     Recent Results (from the past 48 hour(s))   Urine Drug Screen Panel    Collection Time: 07/15/24  6:31 AM   Result Value Ref Range    Amphetamines Urine Screen Negative Screen Negative    Barbituates Urine Screen Negative Screen Negative    Benzodiazepine Urine Screen Negative Screen Negative    Cannabinoids Urine Screen Positive (A) Screen Negative    Cocaine Urine Screen Negative Screen Negative    Fentanyl Qual Urine Screen Negative Screen Negative    Opiates Urine Screen Negative Screen Negative    PCP Urine Screen Negative Screen Negative          Physical and Psychiatric Examination:     /88   Pulse 63   Temp 98.2  F (36.8  C) (Oral)   Resp 16   SpO2 100%   Weight is 0 lbs 0 oz  There is no height or weight on file to calculate BMI.    Mental Status Exam:  Appearance: awake, alert  Attitude:  cooperative  Eye Contact:  good  Mood:  better  Affect:  appropriate and in normal range  Speech:  clear, coherent  Language: fluent and intact in English  Psychomotor, Gait, Musculoskeletal:  no evidence of tardive  dyskinesia, dystonia, or tics  Thought Process:  logical, linear, and goal oriented  Associations:  no loose associations  Thought Content:  no evidence of suicidal ideation or homicidal ideation  Insight:  fair  Judgement:  intact  Oriented to:  time, person, and place  Attention Span and Concentration:  intact  Recent and Remote Memory:  intact  Fund of Knowledge:  delayed         Diagnoses:      Aggressive behavior  Overdose by ingestion  History of schizoaffective disorder         Recommendations:   1. Pt displays the following risk factors that support IP admission: Due to current suicidal attempt and suicidal ideation, severe agitation and aggression toward his family, unable to contract for safety. Pt is unable to engage in safety planning to mitigate risk level in a non-secure setting. Lower levels of care have not been successful in mitigating risk. Due to this IP is the least restrictive option of care for pt. Pt should remain in IP until deemed safe to return to the community and engage in OP  supports     - Continue to recommend inpatient psychiatric hospitalizations for further stabilization   2.  Currently patient is voluntary for inpatient mental health unit if he asked to leave the hospital AGAINST MEDICAL ADVICE please reassess or place 72-hour hold  2.  Continue Zyprexa 5 mg at bedtime, start patient on 450 mg lithium at bedtime for his current paranoid ideation and mood stabilization.  Zyprexa 5 mg po twice daily as needed for severe agitation and aggression     4.  Consult psychiatry as needed  4.   Refer to psychiatric provider for medication management. *   treatment per ED tea    - Consulted with [facility]  licensed mental health professional, ED physician Federica Castillo asked if they would like this writer to enter orders in the EHR,  patient's ED RN regarding this case.    Please call [facility]/DEC at [phone] if you have follow-up questions or wish to place another  consult.  Darryl Valle, Psychiatric Nurse practitioner    Attestation:  Time with:  Patient: 25 minutes  Treatment Team: 20 Minutes  Chart Review: 20 minutes    Total time spent was 65 minutes. Over 50% of times was spent counseling and coordination of care.    I thank  primary ED provider and extended  care team very much for letting me participate in the care of this patient.    I, Darryl Valle, PAUL, APRN, Psychiatric Nurse Practitioner have personally performed an examination of this patient.  I have edited the note to reflect all relevant changes.  I have discussed this patient with the care team July 16, 2024.  I have reviewed all vitals and laboratory findings.    Disclaimer: This note consists of symbols derived from keyboarding,

## 2024-07-16 NOTE — ED NOTES
IP MH Referral Acuity Rating Score (RARS)    LMHP complete at referral to IP MH, with DEC; and, daily while awaiting IP MH placement. Call score to PPS.  CRITERIA SCORING   New 74 HH and Involuntary for IP MH (not adolescent) 0/1   Boarding over 24 hours 1/1   Vulnerable adult at least 55+ with multiple co morbidities; or, Patient age 11 or under 0/1   Suicide ideation without relief of precipitating factors 0/1   Current plan for suicide 0/1   Current plan for homicide 0/1   Imminent risk or actual attempt to seriously harm another without relief of factors precipitating the attempt 1/1   Severe dysfunction in daily living (ex: complete neglect for self care, extreme disruption in vegetative function, extreme deterioration in social interactions) 1/1   Recent (last 2 weeks) or current physical aggression in the ED 0/1   Restraints or seclusion episode in ED 0/1   Verbal aggression, agitation, yelling, etc., while in the ED 0/1   Active psychosis with psychomotor agitation or catatonia 1/1   Need for constant or near constant redirection (from leaving, from others, etc).  0/1   Intrusive or disruptive behaviors 0/1   TOTAL Acuity Total Score: 4

## 2024-07-16 NOTE — TELEPHONE ENCOUNTER
[place]: [place]  Access Inpatient Bed Call Log  7/16/24 @ 1:00am   Intake has called facilities that have not updated their bed status within the last 12 hours.     *METRO:  [place] -- [facility]: @ capacity.  [facility]: POSTING 3 BEDS, MALE ONLY. Reporting no reviews overnight. - Per Jaylen, on a waitlist for reviews.  [place] -- [facility]: @ cap per website. Low acuity  [place] -- [facility]: @ cap per website. Low acuity only.  [place] -- [facility]: @ cap per website.  [facility]: @ cap per website.   [facility]/ beds: @ cap per website. Ages 18-35, Voluntary only, NO aggression/physical/sexual assault, violence hx or drug abuse, or psychosis. Negative Covid.   [place] -- [facility]: @ cap per website.  [place] -- [facility]: @ cap per website.  [place] -- [facility]: @ cap per website. Do not review overnight.      Pt remains on waitlist pending appropriate placement availability

## 2024-07-16 NOTE — ED NOTES
Patient was lying in bed calm and comfortable appear  to be sleeping.No s/s of pain/respiratory distress noted.Patient was breathing normal no concerns noted.

## 2024-07-16 NOTE — ED NOTES
Patient calm and cooperative. Good appetite. Patient denies HI/SI/SIB/AVH. Patient spent time in room coloring during this AM shift.

## 2024-07-16 NOTE — ED PROVIDER NOTES
[facility] ED Mental Health Handoff Note:       Brief HPI:  This is a 28 year old male signed out to me.  See initial ED Provider note for full details of the presentation. Interval history is pertinent for continued need for inpatient hospitalization.  Pursuing commitment process..    Home meds reviewed and ordered/administered: Yes    Medically stable for inpatient mental health admission: Yes.    Evaluated by mental health: Yes. The recommendation is for inpatient mental health treatment. Bed search in process    Safety concerns: At the time I received sign out, there were no safety concerns.    Hold Status:  Active Orders   N/A           Exam:   Patient Vitals for the past 24 hrs:   BP Temp Temp src Pulse Resp SpO2   07/16/24 1142 125/88 98.2  F (36.8  C) Oral -- 16 100 %   07/15/24 1813 128/79 98.3  F (36.8  C) Oral 63 16 100 %           ED Course:    Medications   OLANZapine zydis (zyPREXA) ODT tab 5 mg ( Oral Canceled Entry 7/15/24 2200)   OLANZapine zydis (zyPREXA) ODT tab 5 mg (5 mg Oral $Given 7/12/24 1801)     Or   OLANZapine (zyPREXA) injection 10 mg ( Intramuscular See Alternative 7/12/24 1801)   nicotine (NICORETTE) gum 2 mg (2 mg Buccal $Given 7/15/24 0923)   hydrOXYzine HCl (ATARAX) tablet 25 mg (has no administration in time range)   lithium (ESKALITH CR/LITHOBID) CR tablet 450 mg ( Oral Canceled Entry 7/15/24 2200)            There were no significant events during my shift.    Patient was signed out to the oncoming provider, Dr. Jose      Impression:    ICD-10-CM    1. Aggressive behavior  R46.89       2. Overdose by ingestion  T50.901A       3. History of schizoaffective disorder  Z86.59           Plan:    Awaiting inpatient mental health admission/transfer.  Pursuing commitment process      RESULTS:   No results found for this visit on 07/12/24 (from the past 24 hour(s)).          MD Bud Sherman, Josesito Salcido MD  07/16/24 0738        Josesito Farrell MD  07/16/24 3419

## 2024-07-17 ENCOUNTER — TELEPHONE (OUTPATIENT)
Dept: BEHAVIORAL HEALTH | Facility: CLINIC | Age: 29
End: 2024-07-17
Payer: MEDICAID

## 2024-07-17 PROBLEM — T50.901A OVERDOSE BY INGESTION: Status: ACTIVE | Noted: 2024-07-17

## 2024-07-17 PROBLEM — Z86.59 HISTORY OF SCHIZOAFFECTIVE DISORDER: Status: ACTIVE | Noted: 2024-07-17

## 2024-07-17 PROBLEM — R46.89 AGGRESSIVE BEHAVIOR: Status: ACTIVE | Noted: 2024-07-17

## 2024-07-17 PROCEDURE — 250N000013 HC RX MED GY IP 250 OP 250 PS 637: Performed by: PSYCHIATRY & NEUROLOGY

## 2024-07-17 PROCEDURE — 250N000013 HC RX MED GY IP 250 OP 250 PS 637: Performed by: REGISTERED NURSE

## 2024-07-17 PROCEDURE — 99418 PROLNG IP/OBS E/M EA 15 MIN: CPT | Performed by: REGISTERED NURSE

## 2024-07-17 PROCEDURE — 250N000013 HC RX MED GY IP 250 OP 250 PS 637: Performed by: EMERGENCY MEDICINE

## 2024-07-17 PROCEDURE — 99207 PR NO CHARGE LOS: CPT | Performed by: CLINICAL NURSE SPECIALIST

## 2024-07-17 PROCEDURE — 124N000002 HC R&B MH UMMC

## 2024-07-17 PROCEDURE — H2032 ACTIVITY THERAPY, PER 15 MIN: HCPCS | Performed by: PSYCHOLOGIST

## 2024-07-17 PROCEDURE — 99233 SBSQ HOSP IP/OBS HIGH 50: CPT | Performed by: REGISTERED NURSE

## 2024-07-17 RX ORDER — HYDROXYZINE HYDROCHLORIDE 25 MG/1
25-50 TABLET, FILM COATED ORAL EVERY 4 HOURS PRN
Status: DISCONTINUED | OUTPATIENT
Start: 2024-07-17 | End: 2024-07-23

## 2024-07-17 RX ORDER — OLANZAPINE 5 MG/1
5-10 TABLET, ORALLY DISINTEGRATING ORAL 3 TIMES DAILY PRN
Status: DISCONTINUED | OUTPATIENT
Start: 2024-07-17 | End: 2024-07-29 | Stop reason: HOSPADM

## 2024-07-17 RX ORDER — AMOXICILLIN 250 MG
1 CAPSULE ORAL 2 TIMES DAILY PRN
Status: DISCONTINUED | OUTPATIENT
Start: 2024-07-17 | End: 2024-07-29 | Stop reason: HOSPADM

## 2024-07-17 RX ORDER — DIVALPROEX SODIUM 250 MG/1
250 TABLET, DELAYED RELEASE ORAL EVERY 12 HOURS SCHEDULED
Status: DISCONTINUED | OUTPATIENT
Start: 2024-07-17 | End: 2024-07-18

## 2024-07-17 RX ORDER — MAGNESIUM HYDROXIDE/ALUMINUM HYDROXICE/SIMETHICONE 120; 1200; 1200 MG/30ML; MG/30ML; MG/30ML
30 SUSPENSION ORAL EVERY 4 HOURS PRN
Status: DISCONTINUED | OUTPATIENT
Start: 2024-07-17 | End: 2024-07-29 | Stop reason: HOSPADM

## 2024-07-17 RX ORDER — OLANZAPINE 10 MG/2ML
10 INJECTION, POWDER, FOR SOLUTION INTRAMUSCULAR 3 TIMES DAILY PRN
Status: DISCONTINUED | OUTPATIENT
Start: 2024-07-17 | End: 2024-07-29 | Stop reason: HOSPADM

## 2024-07-17 RX ORDER — TRAZODONE HYDROCHLORIDE 50 MG/1
50 TABLET, FILM COATED ORAL
Status: DISCONTINUED | OUTPATIENT
Start: 2024-07-17 | End: 2024-07-29 | Stop reason: HOSPADM

## 2024-07-17 RX ORDER — ACETAMINOPHEN 325 MG/1
650 TABLET ORAL EVERY 4 HOURS PRN
Status: DISCONTINUED | OUTPATIENT
Start: 2024-07-17 | End: 2024-07-29 | Stop reason: HOSPADM

## 2024-07-17 RX ADMIN — OLANZAPINE 5 MG: 5 TABLET, ORALLY DISINTEGRATING ORAL at 08:19

## 2024-07-17 RX ADMIN — HYDROXYZINE HYDROCHLORIDE 50 MG: 25 TABLET ORAL at 19:00

## 2024-07-17 RX ADMIN — NICOTINE POLACRILEX 2 MG: 2 GUM, CHEWING BUCCAL at 14:17

## 2024-07-17 RX ADMIN — NICOTINE POLACRILEX 2 MG: 2 GUM, CHEWING BUCCAL at 08:19

## 2024-07-17 RX ADMIN — OLANZAPINE 5 MG: 5 TABLET, ORALLY DISINTEGRATING ORAL at 19:59

## 2024-07-17 RX ADMIN — DIVALPROEX SODIUM 250 MG: 250 TABLET, DELAYED RELEASE ORAL at 14:16

## 2024-07-17 RX ADMIN — DIVALPROEX SODIUM 250 MG: 250 TABLET, DELAYED RELEASE ORAL at 19:59

## 2024-07-17 ASSESSMENT — ACTIVITIES OF DAILY LIVING (ADL)
ORAL_HYGIENE: INDEPENDENT
ADLS_ACUITY_SCORE: 28
ADLS_ACUITY_SCORE: 35
ADLS_ACUITY_SCORE: 35
ADLS_ACUITY_SCORE: 45
ADLS_ACUITY_SCORE: 35
ADLS_ACUITY_SCORE: 28
ADLS_ACUITY_SCORE: 35
ADLS_ACUITY_SCORE: 28
ADLS_ACUITY_SCORE: 35
ADLS_ACUITY_SCORE: 28
ADLS_ACUITY_SCORE: 35
ADLS_ACUITY_SCORE: 35
ADLS_ACUITY_SCORE: 28
ADLS_ACUITY_SCORE: 28
ADLS_ACUITY_SCORE: 35
ADLS_ACUITY_SCORE: 28
ADLS_ACUITY_SCORE: 35
ADLS_ACUITY_SCORE: 35
ADLS_ACUITY_SCORE: 28
HYGIENE/GROOMING: INDEPENDENT
DRESS: INDEPENDENT
ADLS_ACUITY_SCORE: 45
ADLS_ACUITY_SCORE: 35

## 2024-07-17 NOTE — CONSULTS
Patient has been accepted to [facility]. Will defer further psychiatric evaluation to the inpatient team.    TAMARA Jean Baptiste, CNP  Emergency Psychiatry  7/17/2024
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
## 2024-07-17 NOTE — TELEPHONE ENCOUNTER
4:06 AM Intake called [address] and provided MRN to casi ADRIAN, for review.     4:19 AM Intake received a call from NGUYỄN Harvey, on [facility], reporting this pt has a hx of acuity and their are concerns that he may escalate in behaviors when admitting on the unit. However, NGUYỄN agrees with putting pt in queue for review and admission on days.     ADULTS:     *[facility] -- [facility]: @ cap per website.  [place] -- [facility]:  @Posting 3 beds.  - 12:47 AM Per Tita, they are capped. - Declined due to hx of aggression   [place] -- [facility]: @ Cap per website.  [place] -- [facility]: @ Cap per website. - 12:20 AM Per Gregor, they are able to review.   [place] -- [facility]: @ Cap per website.  [facility] -- [facility]: @ Cap per website.   [place] -- [facility] beds @ Posting 1 beds. Ages 18-28, Voluntary only, COVID test req'd, NO aggression, physical or sexual assault, violence hx or drug abuse, or psychosis - 12:48 AM Per Marcia, they have 1 bed. - Declined due to needing MHICU bed  [place] -- [facility]: @ Cap per website.  [place] -- [facility]: @ cap per website.  [place] -- [facility]:  @ Cap per website.      Pt remains on waitlist pending appropriate placement availability.
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
## 2024-07-17 NOTE — PLAN OF CARE
"   07/17/24 1234   Patient Belongings   Did you bring any home meds/supplements to the hospital?  No   Patient Belongings remains with patient;sent to security per site process;locker  (pt was given underwear per request. He refused to remove hair binders, headband and wriswatch (Items were checked as well as possible). Pt was informed this was against safety policy and staff would speak with him further. RN was notified.)   Patient Belongings Remaining with Patient other (see comments)  (see above comment and attached note for furthr itemization.)   Belongings Search Yes   Clothing Search Yes   Second Staff Lucho     Items to security in valuable envelope [id number]:   3 credit/debit cards    Items to security sent as contraband:   Glass pipe with residue, bag with suspect white powder, large razor knife    Items put in secure locker #18:   plastic bag with clothing worn upon arrival, misc. Clothing items, shoes, belt, wallet with ID , ins. Card, several promotional ad cards, key, and cigarette papers.; study bible, paper with phone numbers; \"[employer]\" brand back pack with \"beats\" headphones with , cord and case, \"can\" speaker, and [employer] game system.; 2nd backpack with shaving kit containing electric shaver and nose laura, nail and tweezer kit with small screwdriver, 2nd shaving case with toothbrush,  bic razor, lighter,  and misc lotion and hair products.    Items put in lower storage locker:  Large suitcase with numerous clothing items (many of which have drawstrings)    ..A               Admission:  I am responsible for any personal items that are not sent to the safe or pharmacy.  [facility] is not responsible for loss, theft or damage of any property in my possession.    Signature:  _________________________________ Date: _______  Time: _____                                              Staff Signature:  ____________________________ Date: ________  Time: _____      2nd Staff person, if patient is " unable/unwilling to sign:    Signature: ________________________________ Date: ________  Time: _____     Discharge:  [facility] has returned all of my personal belongings:    Signature: _________________________________ Date: ________  Time: _____                                          Staff Signature:  ____________________________ Date: ________  Time: _____

## 2024-07-17 NOTE — TELEPHONE ENCOUNTER
10:00 AM Intake called [facility] for an update - spoke with NGUYỄN Zapata , she accepted pt.     10:02 AM paged Jovana to review.     10:36 AM - Pt accepted to [facility]    11:05 AM intake called [facility]- spoke with CRN - RN will call for report in 30 minutes     11:07 AM Intake called [facility]  with bed placement information.
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
## 2024-07-18 LAB — HBA1C MFR BLD: 5.8 %

## 2024-07-18 PROCEDURE — 93010 ELECTROCARDIOGRAM REPORT: CPT | Mod: GC | Performed by: INTERNAL MEDICINE

## 2024-07-18 PROCEDURE — 99232 SBSQ HOSP IP/OBS MODERATE 35: CPT | Performed by: REGISTERED NURSE

## 2024-07-18 PROCEDURE — 124N000002 HC R&B MH UMMC

## 2024-07-18 PROCEDURE — 90853 GROUP PSYCHOTHERAPY: CPT | Performed by: PSYCHOLOGIST

## 2024-07-18 PROCEDURE — 93005 ELECTROCARDIOGRAM TRACING: CPT

## 2024-07-18 PROCEDURE — 250N000013 HC RX MED GY IP 250 OP 250 PS 637: Performed by: REGISTERED NURSE

## 2024-07-18 PROCEDURE — 90832 PSYTX W PT 30 MINUTES: CPT | Performed by: COUNSELOR

## 2024-07-18 PROCEDURE — G0177 OPPS/PHP; TRAIN & EDUC SERV: HCPCS

## 2024-07-18 PROCEDURE — 250N000013 HC RX MED GY IP 250 OP 250 PS 637: Performed by: PSYCHIATRY & NEUROLOGY

## 2024-07-18 PROCEDURE — 250N000013 HC RX MED GY IP 250 OP 250 PS 637: Performed by: EMERGENCY MEDICINE

## 2024-07-18 RX ORDER — DIVALPROEX SODIUM 500 MG/1
500 TABLET, DELAYED RELEASE ORAL
Status: DISCONTINUED | OUTPATIENT
Start: 2024-07-18 | End: 2024-07-29 | Stop reason: HOSPADM

## 2024-07-18 RX ORDER — DIVALPROEX SODIUM 250 MG/1
250 TABLET, DELAYED RELEASE ORAL EVERY MORNING
Status: DISCONTINUED | OUTPATIENT
Start: 2024-07-18 | End: 2024-07-29 | Stop reason: HOSPADM

## 2024-07-18 RX ADMIN — OLANZAPINE 5 MG: 5 TABLET, ORALLY DISINTEGRATING ORAL at 21:38

## 2024-07-18 RX ADMIN — NICOTINE POLACRILEX 2 MG: 2 GUM, CHEWING BUCCAL at 11:56

## 2024-07-18 RX ADMIN — DIVALPROEX SODIUM 500 MG: 500 TABLET, DELAYED RELEASE ORAL at 21:38

## 2024-07-18 RX ADMIN — HYDROXYZINE HYDROCHLORIDE 50 MG: 25 TABLET ORAL at 18:43

## 2024-07-18 RX ADMIN — DIVALPROEX SODIUM 250 MG: 250 TABLET, DELAYED RELEASE ORAL at 09:04

## 2024-07-18 ASSESSMENT — ACTIVITIES OF DAILY LIVING (ADL)
ADLS_ACUITY_SCORE: 28
DRESS: INDEPENDENT
ADLS_ACUITY_SCORE: 28
HYGIENE/GROOMING: INDEPENDENT
ADLS_ACUITY_SCORE: 28
ORAL_HYGIENE: INDEPENDENT
ADLS_ACUITY_SCORE: 28
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
## 2024-07-18 NOTE — PLAN OF CARE
"Goal Outcome Evaluation:    Plan of Care Reviewed With: patient        Pt easily excitable this morning, for example this am while giving his morning Depakote, a hold was briefly put on the order while provider was adjusting the Depakote order. I tried to explain the reason  I needed to hold the medicine until the new orders were clarified, but pt unable to understand. Pt felt this writer was \"pulling a fast one on him\". Pt was left to self calm and pt later agreed to take Depakote once the order was clarified.  Pt cooperative but guarded and a bit mistrustful. I will try to build therapeutic relationship and build trust. Pt has known history of TBI and will need careful, education and explanations.   Later in the shift pt apologized to this writer for getting upset with me this morning. I told pt it was an understandable response considering the confusion. Pt pleasant with this writer at this time.      Pt attended morning OT group, social with peers. Denies anxiety, depression or SI. Denies hallucinations.                "
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
## 2024-07-18 NOTE — PLAN OF CARE
Upcoming Meetings and Dates/Important Information  Days since IP admission 1  Team Note Due Thursday  No future appointments.     Next Steps  Fin Counseling    Assessment/Intervention/Current Symptoms and Care Coordination  Chart Review  Met with team to discuss patient care.  Pt spoke with financial counseling for MA application    Discharge Plan or Goal  Dispo Plan: IRTS  Transportation: TBD  Medication: TBD    Provisional discharge: Not needed  Safety plan complete?: Not yet completed  Appointments:   TBD     Barriers to Discharge  patient requires further psychiatric stabilization due to current symptomology, medication management with possible adjustments    Expected Discharge Date: 07/26/2024        Discharge Comments: Wants IRTS when stable        Referral Status  No new referral made    Legal Status  voluntary but holdable    Contacts  Robert Rodas Father [phone]

## 2024-07-18 NOTE — PLAN OF CARE
"  Problem: Disruptive Behavior  Goal: Improved Impulse and Aggression Control (Disruptive Behavior)  Outcome: Progressing   Goal Outcome Evaluation:    Plan of Care Reviewed With: patient       Patient was angry when he saw his straight-edge razor being sent to security.  He stated it was a family  heirloom and staff had no right to take it from him.  Patient was dismissive when told by staff that if he was allowed to keep it with him others would also have access and could use it as a weapon.  Patient was visibly trying to remain in control but at one point looked like he was going to lunge over the desk.  When staff tried to reassure him that the safe in the security office is the most secure place in the hospital he again disagreed.  He was also offended that unknown white substance he brought in a baggie might be considered to be drugs - \"I don't do drugs, why do you keep accusing me of that?\"  Nobody present during this conversation had made that accusation.  Blue glass pipe found with other contraband was placed in med room sharps container per 's directive.  Patient admitted he was angry and irritable, and requested PRN hydroxyzine, which he received with other HS meds he'd requested early.  Several times he was able to calm himself down, then began escalating again as he talked about the razor sent to security.  One male peer (A) kept bringing this subject up to patient despite staff requests that he not do this.  Patient denied all mental health problems, including SI and HI, as well as all physical problems.  When he was calm later in the shift he stated \"I'm just here to get into an IRTS; hope it doesn't take long.\"  /89 (BP Location: Left arm)   Pulse 70   Temp 98  F (36.7  C) (Temporal)   Resp 16   Ht 1.778 m (5' 10\")   Wt 63 kg (139 lb)   SpO2 100%   BMI 19.94 kg/m             "

## 2024-07-18 NOTE — PLAN OF CARE
"   07/17/24 1900   Comments   Comments Pt became angry about triage of his valuables     Pt became loud when expressing grievance regarding his valuables being sent to security. This writer completed his personal search and valuables inventory earlier in the shift - Pt cooperated with the safety search in in all but declined to give up his watch , hair ties and headband despite encouragement and review of policy. His provider assessed this and wrote an order for him to keep these items. He remained worried about his belongings after their inventory. This writer spent additional time with his inventory and spoke with him about the policies and care for his valuables. He became upset when security picked up his \"shaving\" razor knife. Staff reassured him of the items safety but he remained upset about the not keeping his knife on the unit.. He was given a copy if the inventory to review. Pt was further inflamed regarding staff listing his pipe and a bag of white substance. Security recommended staff his glass pipe with white substance in sharps and discard the bag of granular powder in the med bin. RN notified and items were left in the med room. Pt was much calm later in the shift and later said he had lost things in the past and his knife was a family heirloom.                      "

## 2024-07-18 NOTE — PLAN OF CARE
Goal Outcome Evaluation:         Pt appeared to sleep 6.25 hours during the night, no problems reported, no PRN's given, on Status 15 minute safety checks.

## 2024-07-18 NOTE — DISCHARGE INSTRUCTIONS
Behavioral Discharge Planning and Instructions    Summary: You were admitted on 7/12/2024 due to behavioral dysregulation and agitation.  You were treated by TAMARA Zambrano, PAUL and TAMARA Dyson CNP for ongoing psychiatric assessment and medication management.  You had opportunities to participate in therapeutic groups on the unit. You were discharged on 7/29/2024 from [facility] to [facility] located at [address] [facility] [address]; phone [phone].    Primary Diagnoses:  Schizoaffective disorder, bipolar type, decompensated     Secondary Diagnoses:   Cannabis use disorder, severe  Tobacco use, r/o disorder  Alcohol use disorder, per chart  ADHD, per chart  ODD, per chart  PTSD, per patient report  Head injury with subdural hematoma    Health Care Follow-up:   You have been referred to the [facility] Transition Clinic. The Transition Clinic offers short-term psychotherapy and medication management as a bridge for patients waiting for their next level of care or waiting to establish care with a provider. These appointments are scheduled for:  Diagnostic Evaluation Therapy Intake Thursday August 1st 2024 arrive by 2:00 PM  DIANNE Florez with [facility] Transition Clinic   [address] [place] [address] [address] [place] [address]  Phone [phone]    Psychiatric Medication Management Intake Tuesday August 13th 2024 arrive by 2:30 PM  TAMARA Moody CNP with [facility] Transition [facility]   [address] [place] [address] [address] [place] [address]  Phone [phone]    You have been referred to [facility] for psychotherapy and medication management. These appointments to establish care are scheduled for:  [facility] Diagnostic Assessment Thursday December 26th 2024 arrive by  10:30 AM  ESEQUIEL Toledo with [facility] Counseling [facility]   Phone [phone]  Please Note: This is a virtual visit; there is no need to come to the facility.  Please log on at 10:30 AM to complete your check-in. Then, the provider will join you (or send you a link to join them) at 11:00 AM.     Psychiatric Medication Management Intake Friday January 17th 2025 arrive by 9:30 AM  TAMARA Anguiano CNP with [facility] [place] [address] [place] [address]  Phone [phone]    Attend all scheduled appointments with your outpatient providers. Call at least 24 hours in advance if you need to reschedule an appointment to ensure continued access to your outpatient providers.     You have been referred to [facility] for Adult Mental Health Targeted Case Management. They will call you when they are ready to assign you to a . You can call [facility] at [phone] to check on the status of your referral.    Your [place] Medicaid includes transportation benefits to and from medical appointments. [facility] coordinates non-emergency medical transportation (NEMT) for fee for service Medical Assistance recipients in the [facility].  Call [facility] at [phone] at least three business days (Monday through Friday) before your healthcare appointment. If you call with less notice and the trip is not urgent, you may need to set up your visit for a different date. We schedule routine trips Monday through Friday from 7 a.m. to 6 p.m. Urgent rides can be scheduled after these hours, 24 hours a day, seven days a week.  When you call, please be ready to provide:  Your name, home address, and phone number  Your Medical Assistance ID Number ([id number])  The name, phone number, address, and ZIP code of the healthcare provider you are seeing  The date and start time of your  appointment  The end time of your appointment, if you know it  Any special ride needs, including if you need someone to ride with you  General reason for the appointment (check-up, eye doctor s appointment, etc.)  If your ride is more than 15 minutes late after your scheduled pick-up time, call [facility] s Where s My Ride line at [phone]. [facility] will call your  to find out when your ride will arrive.    Major Treatments, Procedures and Findings:  You were provided with: a psychiatric assessment, assessed for medical stability, medication evaluation and/or management, group therapy, individual therapy, and milieu management    Symptoms to Report: feeling more aggressive, increased confusion, losing more sleep, mood getting worse, or thoughts of suicide    Early warning signs can include: increased depression or anxiety sleep disturbances increased thoughts or behaviors of suicide or self-harm  increased unusual thinking, such as paranoia or hearing voices    Safety and Wellness:  Take all medicines as directed.  Make no changes unless your doctor suggests them.      Follow treatment recommendations.  Refrain from alcohol and non-prescribed drugs.  If there is a concern for safety, call 911.    Resources:   Mental Health Crisis Resources  Throughout [place]: call **CRISIS (**368542)  Crisis Text Line: is available for free, 24/7 by texting [place] to 786684  Suicide Awareness Voices of Education (SAVE) (www.save.org): 696-486-GNSW (5295)  The National Suicide Prevention Lifeline is now: 988 Suicide and Crisis Lifeline. Call 988 anytime.  National [place] on Mental Illness (www.[place].rozina.org): 422.768.5964 or 982-209-3540.  Urfp8pguu: text the word LIFE to 46038 for immediate support and crisis intervention  Mental Health Consumer/Survivor Network of [place] (www.mhcsn.net): 963.876.5730 or 304-035-8195  Mental Health Association of [place] (www.mentalhealth.org): 635.837.4394 or 426-837-1990  Peer Support Connection [place] Warmline  ([facility]) 7-154-295-2615 Available from 5pm - 9am (7 days a week/365 days a year)  [facility] 1-531.747.1393 Community Outreach for Psych Emergencies    General Medication Instructions:   See your medication sheet(s) for instructions.   Take all medicines as directed.  Make no changes unless your doctor suggests them.   Go to all your doctor visits.  Be sure to have all your required lab tests. This way, your medicines can be refilled on time.  Do not use any drugs not prescribed by your doctor.  Avoid alcohol.    Advance Directives:   Scanned document on file with [employer]? No scanned doc  Is document scanned? Pt states no documents  Honoring Choices Your Rights Handout: Informed and given  Was more information offered? Pt declined    The Treatment team has appreciated the opportunity to work with you. If you have any questions or concerns about your recent admission, you can contact the unit which can receive your call 24 hours a day, 7 days a week. They will be able to get in touch with a Provider if needed. The unit number is [phone].

## 2024-07-18 NOTE — PLAN OF CARE
Goal Outcome Evaluation:    IP Treatment Plan    Client's Name: Natalio Rodas  YOB: 1995      Treatment Plan Date: July 18, 2024      Anticipated number of sessions for this episode of care: 6    Current Concerns/Problem Areas:    Goal 1 :Identify 1-2 ways that substance use has negatively impacted their life and Discuss and understand the skills essential to maintaining a substance free lifePt will increase the use of healthy coping skills from 1 times to 4 times/day Increase social interaction by 50% per patient report from a current no socialization at home reported, doesn't interact much with brother or aunt with whom he lives. Writer asked him to practice initiating card games, board games etc with peers on the unit for positive practice. He has good insight about both his manic and his depressive phases. He also has insight that drinking and weed affects his mental health.      Intervention(s)    Engaged in safety planning identifying coping skills, warning signs, health support and resources, Understand and identify reasons for hospitalization, how to use the hospital to create change and prevent future hospitalizations , and Engaged in social skills training            Goal 2 : OTHER-During interactions with others, Natalio will utilize more positive social behaviors in 8 out of 10 opportunities. When interacting with another person, Natalio will incorporate his or her perspective 8 out of 10 times.      Intervention(s)    Explored barriers to having and keeping positive friends and family relationship and Explored motivation for behavioral change        Pt centered considerations  Cultural considerations will discuss next session, family/ cultural values, he is having issues with his family
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
## 2024-07-19 LAB
CHOLEST SERPL-MCNC: 175 MG/DL
HDLC SERPL-MCNC: 66 MG/DL
HOLD SPECIMEN: NORMAL
HOLD SPECIMEN: NORMAL
LDLC SERPL CALC-MCNC: 99 MG/DL
NONHDLC SERPL-MCNC: 109 MG/DL
TRIGL SERPL-MCNC: 51 MG/DL
TSH SERPL DL<=0.005 MIU/L-ACNC: 1.32 UIU/ML (ref 0.3–4.2)
VIT B12 SERPL-MCNC: 955 PG/ML (ref 232–1245)
VIT D+METAB SERPL-MCNC: 17 NG/ML (ref 20–50)

## 2024-07-19 PROCEDURE — 90853 GROUP PSYCHOTHERAPY: CPT | Performed by: PSYCHOLOGIST

## 2024-07-19 PROCEDURE — 80061 LIPID PANEL: CPT | Performed by: REGISTERED NURSE

## 2024-07-19 PROCEDURE — 82306 VITAMIN D 25 HYDROXY: CPT | Performed by: REGISTERED NURSE

## 2024-07-19 PROCEDURE — 90853 GROUP PSYCHOTHERAPY: CPT

## 2024-07-19 PROCEDURE — 99232 SBSQ HOSP IP/OBS MODERATE 35: CPT | Performed by: REGISTERED NURSE

## 2024-07-19 PROCEDURE — 250N000013 HC RX MED GY IP 250 OP 250 PS 637: Performed by: REGISTERED NURSE

## 2024-07-19 PROCEDURE — 36415 COLL VENOUS BLD VENIPUNCTURE: CPT | Performed by: REGISTERED NURSE

## 2024-07-19 PROCEDURE — 250N000013 HC RX MED GY IP 250 OP 250 PS 637: Performed by: PSYCHIATRY & NEUROLOGY

## 2024-07-19 PROCEDURE — 82607 VITAMIN B-12: CPT | Performed by: REGISTERED NURSE

## 2024-07-19 PROCEDURE — 124N000002 HC R&B MH UMMC

## 2024-07-19 PROCEDURE — 84443 ASSAY THYROID STIM HORMONE: CPT | Performed by: REGISTERED NURSE

## 2024-07-19 RX ADMIN — DIVALPROEX SODIUM 500 MG: 500 TABLET, DELAYED RELEASE ORAL at 20:20

## 2024-07-19 RX ADMIN — NICOTINE POLACRILEX 2 MG: 2 GUM, CHEWING BUCCAL at 12:47

## 2024-07-19 RX ADMIN — OLANZAPINE 10 MG: 5 TABLET, ORALLY DISINTEGRATING ORAL at 17:00

## 2024-07-19 RX ADMIN — DIVALPROEX SODIUM 250 MG: 250 TABLET, DELAYED RELEASE ORAL at 08:41

## 2024-07-19 RX ADMIN — OLANZAPINE 5 MG: 5 TABLET, ORALLY DISINTEGRATING ORAL at 20:20

## 2024-07-19 ASSESSMENT — ACTIVITIES OF DAILY LIVING (ADL)
ADLS_ACUITY_SCORE: 28
LAUNDRY: WITH SUPERVISION
ADLS_ACUITY_SCORE: 28
DRESS: INDEPENDENT
ADLS_ACUITY_SCORE: 28
HYGIENE/GROOMING: INDEPENDENT
ORAL_HYGIENE: INDEPENDENT

## 2024-07-19 NOTE — PLAN OF CARE
Problem: Sleep Disturbance  Goal: Adequate Sleep/Rest  Outcome: Progressing   Goal Outcome Evaluation:    Patient slept 7 During the night. Safety checks done every 15 minutes. No PRN was given. No concern noted.

## 2024-07-19 NOTE — PROGRESS NOTES
"[facility], [place]   Psychiatric Progress Note    Hospital Day: 2  Interim History:   From H&P:  This patient is a 28 year old male with a history of schizoaffective disorder, bipolar type, bipolar 1 disorder, trauma, TBI, ADHD, ODD, alcohol use disorder, and cannabis use disorder, who presented to [facility] ED on 7/12/2024 with aggression and behavioral dysregulation after ingesting 2.5g each of ibuprofen and Tylenol, in the context of medication nonadherence, cannabis use, and psychosocial stressors.     Team meeting report: The patient's care was discussed with the treatment team during the daily team meeting and staff's chart notes were reviewed.  Patient attended OT group.  He respectfully listened to peers.  Per RN note, patient became slightly agitated about medication adjustments.  Patient was cooperative but guarded on exam.  Patient denied depression, anxiety, or hallucinations.  His mood was stable during evening shift.  Per staff documentation, patient slept 7 hours last night.    Met with patient.  Patient is sleeping well.  He denies issues falling or staying asleep.  He reports his mood is \"good\" today.  He denies depression, anxiety, or hallucinations.  Patient does endorse feeling some paranoia about \"people on the unit\".  Patient denies that his paranoia is about a specific person.  He is not afraid of people on the unit.  He does not feel a need to protect himself from others on the unit.  Patient reports this feeling of paranoia is often present in social situations.  Patient reports feeling safe on the unit.  Patient reports hydroxyzine was helpful for anxiety yesterday.  Patient is mostly calm during the interview with occasional irritability.  He is guarded on exam and does not provide much insight into his current symptoms.  He denies suicidal ideation or homicidal ideation.  He is tolerating medications well and denies side effects.  Patient is reluctant to increase " Zyprexa.  Provider discusses risperidone, as it may be a better medication for his constellation of symptoms.  Patient reports side effects from Risperdal in the past and does not wish to switch antipsychotics at this time.    Medications:     Current Facility-Administered Medications   Medication Dose Route Frequency Provider Last Rate Last Admin    divalproex sodium delayed-release (DEPAKOTE) DR tablet 250 mg  250 mg Oral QAM Millie Whaley APRN CNP   250 mg at 07/19/24 0841    divalproex sodium delayed-release (DEPAKOTE) DR tablet 500 mg  500 mg Oral Daily Millie Whaley APRN CNP   500 mg at 07/18/24 2138    OLANZapine zydis (zyPREXA) ODT tab 5 mg  5 mg Oral At Bedtime Miguel Martinez MD   5 mg at 07/18/24 2138       Allergies:     Allergies   Allergen Reactions    Nsaids      PT CAN NOT TAKE WITH LITHIUM        Labs:     Recent Results (from the past 672 hour(s))   Comprehensive metabolic panel    Collection Time: 07/12/24  4:04 PM   Result Value Ref Range    Sodium 138 135 - 145 mmol/L    Potassium 3.7 3.4 - 5.3 mmol/L    Carbon Dioxide (CO2) 24 22 - 29 mmol/L    Anion Gap 12 7 - 15 mmol/L    Urea Nitrogen 10.6 6.0 - 20.0 mg/dL    Creatinine 1.02 0.67 - 1.17 mg/dL    GFR Estimate >90 >60 mL/min/1.73m2    Calcium 9.7 8.6 - 10.0 mg/dL    Chloride 102 98 - 107 mmol/L    Glucose 100 (H) 70 - 99 mg/dL    Alkaline Phosphatase 83 40 - 150 U/L    AST 27 0 - 45 U/L    ALT 17 0 - 70 U/L    Protein Total 7.6 6.4 - 8.3 g/dL    Albumin 4.8 3.5 - 5.2 g/dL    Bilirubin Total 0.5 <=1.2 mg/dL   TSH with free T4 reflex    Collection Time: 07/12/24  4:04 PM   Result Value Ref Range    TSH 0.66 0.30 - 4.20 uIU/mL   Salicylate level    Collection Time: 07/12/24  4:04 PM   Result Value Ref Range    Salicylate <0.3   mg/dL   Acetaminophen level    Collection Time: 07/12/24  4:04 PM   Result Value Ref Range    Acetaminophen 25.7 10.0 - 30.0 ug/mL   CBC with platelets and differential    Collection Time: 07/12/24   4:04 PM   Result Value Ref Range    WBC Count 7.9 4.0 - 11.0 10e3/uL    RBC Count 5.27 4.40 - 5.90 10e6/uL    Hemoglobin 16.0 13.3 - 17.7 g/dL    Hematocrit 48.4 40.0 - 53.0 %    MCV 92 78 - 100 fL    MCH 30.4 26.5 - 33.0 pg    MCHC 33.1 31.5 - 36.5 g/dL    RDW 13.0 10.0 - 15.0 %    Platelet Count 279 150 - 450 10e3/uL    % Neutrophils 74 %    % Lymphocytes 19 %    % Monocytes 5 %    % Eosinophils 1 %    % Basophils 1 %    % Immature Granulocytes 0 %    NRBCs per 100 WBC 0 <1 /100    Absolute Neutrophils 5.9 1.6 - 8.3 10e3/uL    Absolute Lymphocytes 1.5 0.8 - 5.3 10e3/uL    Absolute Monocytes 0.4 0.0 - 1.3 10e3/uL    Absolute Eosinophils 0.1 0.0 - 0.7 10e3/uL    Absolute Basophils 0.1 0.0 - 0.2 10e3/uL    Absolute Immature Granulocytes 0.0 <=0.4 10e3/uL    Absolute NRBCs 0.0 10e3/uL   Hemoglobin A1c    Collection Time: 07/12/24  4:04 PM   Result Value Ref Range    Hemoglobin A1C 5.8 (H) <5.7 %   Asymptomatic COVID-19 Virus (Coronavirus) by PCR Nose    Collection Time: 07/12/24  4:06 PM    Specimen: Nose; Swab   Result Value Ref Range    SARS CoV2 PCR Negative Negative   Urine Drug Screen Panel    Collection Time: 07/15/24  6:31 AM   Result Value Ref Range    Amphetamines Urine Screen Negative Screen Negative    Barbituates Urine Screen Negative Screen Negative    Benzodiazepine Urine Screen Negative Screen Negative    Cannabinoids Urine Screen Positive (A) Screen Negative    Cocaine Urine Screen Negative Screen Negative    Fentanyl Qual Urine Screen Negative Screen Negative    Opiates Urine Screen Negative Screen Negative    PCP Urine Screen Negative Screen Negative   EKG 12-lead, tracing only    Collection Time: 07/18/24  2:23 PM   Result Value Ref Range    Systolic Blood Pressure  mmHg    Diastolic Blood Pressure  mmHg    Ventricular Rate 75 BPM    Atrial Rate 75 BPM    SC Interval 164 ms    QRS Duration 114 ms     ms    QTc 426 ms    P Axis 55 degrees    R AXIS 74 degrees    T Axis 38 degrees     "Interpretation ECG       Sinus rhythm  Normal ECG  When compared with ECG of 10-SEP-2019 10:23,  No significant change was found  Confirmed by fellow Silver Vogel ([id number]) on 7/19/2024 10:02:31 AM     Vitamin D Deficiency    Collection Time: 07/19/24  8:52 AM   Result Value Ref Range    Vitamin D, Total (25-Hydroxy) 17 (L) 20 - 50 ng/mL   Lipid panel reflex to direct LDL    Collection Time: 07/19/24  8:52 AM   Result Value Ref Range    Cholesterol 175 <200 mg/dL    Triglycerides 51 <150 mg/dL    Direct Measure HDL 66 >=40 mg/dL    LDL Cholesterol Calculated 99 <=100 mg/dL    Non HDL Cholesterol 109 <130 mg/dL   TSH with free T4 reflex    Collection Time: 07/19/24  8:52 AM   Result Value Ref Range    TSH 1.32 0.30 - 4.20 uIU/mL   Vitamin B12    Collection Time: 07/19/24  8:52 AM   Result Value Ref Range    Vitamin B12 955 232 - 1,245 pg/mL   Extra Red Top Tube    Collection Time: 07/19/24  8:52 AM   Result Value Ref Range    Hold Specimen JIC    Extra Purple Top Tube    Collection Time: 07/19/24  8:52 AM   Result Value Ref Range    Hold Specimen JIC        Psychiatric Examination:   Temp: 98.6  F (37  C) Temp src: Oral BP: 121/65 Pulse: 85   Resp: 16 SpO2: 98 % O2 Device: None (Room air)    Weight is 139 lbs 0 oz  Body mass index is 19.94 kg/m .    Appearance:  awake, alert, adequately groomed, dressed in hospital scrubs, and appeared as age stated  Attitude:  guarded, mostly cooperative  Eye Contact:  fair  Mood:  \"good\"  Affect:  flat, mildly irritable  Speech:  clear, coherent, increased latency  Psychomotor Behavior:  no evidence of tardive dyskinesia, dystonia, or tics  Thought Process:  linear, thought blocking  Associations:  no loose associations  Thought Content:  no evidence of suicidal ideation or homicidal ideation, auditory hallucinations present, and no visual hallucinations present  Insight:  limited  Judgement:  poor  Oriented to:  time, person, and place  Attention Span and Concentration:  " distractible, but able to be redirected  Recent and Remote Memory:  not formally tested, appears to be impaired    Precautions:     Behavioral Orders   Procedures    Assault precautions    Cheeking Precautions (behavioral units)    Code 1 - Restrict to Unit    Elopement precautions    Routine Programming     As clinically indicated    Status 15     Every 15 minutes.    Suicide precautions: Suicide Risk: MODERATE; Clinical rationale to override score: modification to the care environment     Patients on Suicide Precautions should have a Combination Diet ordered that includes a Diet selection(s) AND a Behavioral Tray selection for Safe Tray - with utensils, or Safe Tray - NO utensils       Order Specific Question:   Suicide Risk     Answer:   MODERATE     Order Specific Question:   Clinical rationale to override score:     Answer:   modification to the care environment       Diagnoses:   Primary Diagnoses:  Schizoaffective disorder, bipolar type, decompensated     Secondary Diagnoses:   Cannabis use disorder, severe  Tobacco use, r/o disorder  Alcohol use disorder, per chart  ADHD, per chart  ODD, per chart  PTSD, per patient report  Head injury with subdural hematoma       Hospital Course:     Plan on Admission:   Admit to [address]   PTA Zyprexa and nicotine gum, which were initiated in the ED, were continued.  PTA lithium, which was initiated in the ED, was not restarted.    New medications started at the time of admission include Depakote DR, hydroxyzine, Zyprexa, and trazodone.    Start Safety precautions:  suicide, cheeking, elopement, assault   Labs: TSH, lipids, UDS, CBC, CMP, HgbA1c, B12/folate, vitamin D  EKG       Plan:   Today's Changes:  - No medication changes today.    Medications:  Target psychiatric symptoms and interventions:  # Psychosis   # Mood  - Continue Zyprexa 5mg HS  - Continue Depakote DR 250mg AM and Depakote DR 500mg PM     # Anxiety   - Continue hydroxyzine 25-50mg q4h  PRN for acute anxiety     # Tobacco Withdrawal  - Continue nicotine gum 2-4mg q 1hr PRN     # Insomnia  - Continue Trazodone 50mg at bedtime PRN for sleep disturbances     # Agitation  - Continue Zyprexa 5-10mg TID PRN for severe agitation     Risks, benefits, and alternatives discussed at length with patient.    Medical:  --Internal medicine to follow up for medical problems   ---No acute medical concerns     Pertinent labs/imaging:  - New labs today     Consults:   --Care was coordinated with the treatment team.   --The patient was consulted on nature of illness and treatment options.      Disposition Plan   Reason for ongoing admission:   - is unable to care for self due to severe psychosis or soheila  Discharge location:   - [facility] facility  Discharge Medications:   - not ordered  Follow-up Appointments:   - not scheduled  Estimated Length of Stay:   - 7-10 days    Discharge will be granted once symptoms improved.    TAMARA Dyson, CNP     This note was created with the help of Dragon dictation system. All grammatical/typing errors or context distortion are unintentional and inherent to software.    Attestation:  Patient has been seen and evaluated by me, TAMARA Dyson, CNP.  I spent >35 min spent on the date of the encounter in chart review, patient visit, review of tests, documentation, care coordination, and/or discussion with other providers about the issues documented above.
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
## 2024-07-19 NOTE — PLAN OF CARE
Upcoming Meetings and Dates/Important Information  Days since IP admission 2  Team Note Due Thursday  No future appointments.     Next Steps  Fin Counseling    Assessment/Intervention/Current Symptoms and Care Coordination  Chart Review  Met with team to discuss patient care.  Per financial counseling note, pt is eligible for MA    Discharge Plan or Goal  Dispo Plan: [facility]  Transportation: TBD  Medication: TBD    Provisional discharge: Not needed  Safety plan complete?: Not yet completed  Appointments:   TBD     Barriers to Discharge  patient requires further psychiatric stabilization due to current symptomology, medication management with possible adjustments    Expected Discharge Date: 07/26/2024        Discharge Comments: Wants IRTS when stable        Referral Status  No new referral made    Legal Status  voluntary but holdable    Contacts  Robert Rodas Father [phone]

## 2024-07-19 NOTE — PLAN OF CARE
"  Problem: Manic or Hypomanic Signs/Symptoms  Goal: Optimized Cognitive Function (Manic/Hypomanic Signs/Symptoms)  Outcome: Progressing  Intervention: Support and Promote Cognitive Ability  Recent Flowsheet Documentation  Taken 7/18/2024 1800 by Ashley Haynes RN  Trust Relationship/Rapport:   care explained   choices provided   Goal Outcome Evaluation:    Plan of Care Reviewed With: patient       Patient's mood is stable this evening.  No mood lability or perseveration noted.  He denied SI, HI, depression, hallucinations, thought problems, hallucinations, medication side effects, physical problems, and pain.  Patient requested and received PRN Hydroxyzine 50 mg for mild anxiety.  He continues to pace but demeanor is relaxed and he appears comfortable on the unit.  Patient is able to laugh and joke appropriately with staff.  No irritability or anger were observed, and he was able to tolerate brief delay of gratification when staff were helping peers.   /77 (BP Location: Right arm)   Pulse 67   Temp 98.2  F (36.8  C) (Oral)   Resp 16   Ht 1.778 m (5' 10\")   Wt 63 kg (139 lb)   SpO2 99%   BMI 19.94 kg/m               "
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
## 2024-07-20 LAB
ATRIAL RATE - MUSE: 75 BPM
DIASTOLIC BLOOD PRESSURE - MUSE: NORMAL MMHG
INTERPRETATION ECG - MUSE: NORMAL
P AXIS - MUSE: 55 DEGREES
PR INTERVAL - MUSE: 164 MS
QRS DURATION - MUSE: 114 MS
QT - MUSE: 382 MS
QTC - MUSE: 426 MS
R AXIS - MUSE: 74 DEGREES
SYSTOLIC BLOOD PRESSURE - MUSE: NORMAL MMHG
T AXIS - MUSE: 38 DEGREES
VENTRICULAR RATE- MUSE: 75 BPM

## 2024-07-20 PROCEDURE — 124N000002 HC R&B MH UMMC

## 2024-07-20 PROCEDURE — 250N000013 HC RX MED GY IP 250 OP 250 PS 637: Performed by: PSYCHIATRY & NEUROLOGY

## 2024-07-20 PROCEDURE — H2032 ACTIVITY THERAPY, PER 15 MIN: HCPCS | Performed by: PSYCHOLOGIST

## 2024-07-20 PROCEDURE — 250N000013 HC RX MED GY IP 250 OP 250 PS 637: Performed by: REGISTERED NURSE

## 2024-07-20 RX ADMIN — DIVALPROEX SODIUM 500 MG: 500 TABLET, DELAYED RELEASE ORAL at 20:18

## 2024-07-20 RX ADMIN — TRAZODONE HYDROCHLORIDE 50 MG: 50 TABLET ORAL at 23:48

## 2024-07-20 RX ADMIN — OLANZAPINE 10 MG: 5 TABLET, ORALLY DISINTEGRATING ORAL at 13:01

## 2024-07-20 RX ADMIN — NICOTINE POLACRILEX 2 MG: 2 GUM, CHEWING BUCCAL at 11:39

## 2024-07-20 RX ADMIN — DIVALPROEX SODIUM 250 MG: 250 TABLET, DELAYED RELEASE ORAL at 08:17

## 2024-07-20 RX ADMIN — OLANZAPINE 5 MG: 5 TABLET, ORALLY DISINTEGRATING ORAL at 20:18

## 2024-07-20 ASSESSMENT — ACTIVITIES OF DAILY LIVING (ADL)
ORAL_HYGIENE: INDEPENDENT
HYGIENE/GROOMING: INDEPENDENT
ADLS_ACUITY_SCORE: 28
DRESS: INDEPENDENT
ADLS_ACUITY_SCORE: 28
LAUNDRY: WITH SUPERVISION
ADLS_ACUITY_SCORE: 28

## 2024-07-20 NOTE — PLAN OF CARE
"  Problem: Manic or Hypomanic Signs/Symptoms  Goal: Enhanced Social, Occupational or Functional Skills (Manic/Hypomanic Signs/Symptoms)  Outcome: Not Progressing   Goal Outcome Evaluation:    Plan of Care Reviewed With: patient    Patient appeared preoccupied all shift but denied hallucinations.  Early in the shift he was agitated and requested/received PRN Zyprexa 10 mg.  When asked what was causing this, he replied \"it's not a what, it's a who\" but declined to elaborate.  Patient present in the lounge most of the shift; little interaction with peers observed.  Long latency of response noted during staff's conversation with him.  He endorsed moderate anxiety but denied SI, HI, and all other psychiatric symptoms, as well as pain, major physical problems, and medication side effects.  By the end of our conversation affect was slightly brighter.  /65 (BP Location: Left arm)   Pulse 85   Temp 98.6  F (37  C) (Oral)   Resp 16   Ht 1.778 m (5' 10\")   Wt 63 kg (139 lb)   SpO2 98%   BMI 19.94 kg/m                   "
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
## 2024-07-20 NOTE — PLAN OF CARE
Problem: Sleep Disturbance  Goal: Adequate Sleep/Rest  Outcome: Progressing   Goal Outcome Evaluation:    Pt slept for  5.25 hours. Pt appears sleeping during 15 minute checks. No acute concerns . Continue with current care plan.

## 2024-07-21 PROCEDURE — 250N000013 HC RX MED GY IP 250 OP 250 PS 637: Performed by: REGISTERED NURSE

## 2024-07-21 PROCEDURE — 250N000013 HC RX MED GY IP 250 OP 250 PS 637: Performed by: PSYCHIATRY & NEUROLOGY

## 2024-07-21 PROCEDURE — 124N000002 HC R&B MH UMMC

## 2024-07-21 RX ADMIN — NICOTINE POLACRILEX 2 MG: 2 GUM, CHEWING BUCCAL at 11:39

## 2024-07-21 RX ADMIN — OLANZAPINE 5 MG: 5 TABLET, ORALLY DISINTEGRATING ORAL at 20:11

## 2024-07-21 RX ADMIN — DIVALPROEX SODIUM 500 MG: 500 TABLET, DELAYED RELEASE ORAL at 20:12

## 2024-07-21 RX ADMIN — NICOTINE POLACRILEX 2 MG: 2 GUM, CHEWING BUCCAL at 08:05

## 2024-07-21 RX ADMIN — HYDROXYZINE HYDROCHLORIDE 25 MG: 25 TABLET ORAL at 13:48

## 2024-07-21 RX ADMIN — DIVALPROEX SODIUM 250 MG: 250 TABLET, DELAYED RELEASE ORAL at 08:05

## 2024-07-21 ASSESSMENT — ACTIVITIES OF DAILY LIVING (ADL)
ORAL_HYGIENE: INDEPENDENT
ADLS_ACUITY_SCORE: 28
DRESS: INDEPENDENT
ADLS_ACUITY_SCORE: 28
HYGIENE/GROOMING: INDEPENDENT
ADLS_ACUITY_SCORE: 28
LAUNDRY: WITH SUPERVISION
ADLS_ACUITY_SCORE: 28
ADLS_ACUITY_SCORE: 28
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
## 2024-07-21 NOTE — PLAN OF CARE
Problem: Sleep Disturbance  Goal: Adequate Sleep/Rest  Outcome: Progressing  Intervention: Promote Sleep/Rest    Patient in room and in bed sleeping at the beginning of the shift. Safety checks and precautions in place. prn trazodone given .Patient slept approximately 6.25 hours Will continue to monitor and offer therapeutic support as needed for the rest of the shift.
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
## 2024-07-21 NOTE — PLAN OF CARE
"  Problem: Disruptive Behavior  Goal: Improved Mood Symptoms (Disruptive Behavior)  Outcome: Not Progressing   Goal Outcome Evaluation:    Plan of Care Reviewed With: patient    Patient was isolative, terse, and abrupt this evening.  He declined offers of PRN medication and nicotine gum. When writer attempted to check in with him answers were monosyllabic and eventually non-existent.  He denied unmet needs and stated \"everything is fine.\"  Patient actively avoided eye contact during interactions, but did admit he is feeling irritable and is concerned about noisy peers disturbing sleep.  He did attend art therapy and appeared to enjoy it.  I encourage him to talk to staff about sleep aids if he awakens before 0300.  Addendum:  staff doing environmental checks found a tall hospital paper cup containing a rios viscous liquid and approximately one cup of fruit chunks.  This was disposed of in compliance with unit policy of no perishable food allowed in patient rooms.  \Blood pressure 116/72, pulse 73, temperature 98.6  F (37  C), temperature source Oral, resp. rate 16, height 1.778 m (5' 10\"), weight 63 kg (139 lb), SpO2 99%. S                 "

## 2024-07-22 PROCEDURE — 250N000013 HC RX MED GY IP 250 OP 250 PS 637: Performed by: REGISTERED NURSE

## 2024-07-22 PROCEDURE — 99232 SBSQ HOSP IP/OBS MODERATE 35: CPT | Performed by: REGISTERED NURSE

## 2024-07-22 PROCEDURE — 124N000002 HC R&B MH UMMC

## 2024-07-22 PROCEDURE — H2032 ACTIVITY THERAPY, PER 15 MIN: HCPCS | Performed by: PSYCHOLOGIST

## 2024-07-22 RX ORDER — ARIPIPRAZOLE 10 MG/1
10 TABLET ORAL DAILY
Status: DISCONTINUED | OUTPATIENT
Start: 2024-07-22 | End: 2024-07-29 | Stop reason: HOSPADM

## 2024-07-22 RX ADMIN — DIVALPROEX SODIUM 250 MG: 250 TABLET, DELAYED RELEASE ORAL at 08:15

## 2024-07-22 RX ADMIN — HYDROXYZINE HYDROCHLORIDE 50 MG: 25 TABLET ORAL at 19:51

## 2024-07-22 RX ADMIN — NICOTINE POLACRILEX 2 MG: 2 GUM, CHEWING BUCCAL at 12:38

## 2024-07-22 RX ADMIN — ARIPIPRAZOLE 10 MG: 10 TABLET ORAL at 11:20

## 2024-07-22 RX ADMIN — TRAZODONE HYDROCHLORIDE 50 MG: 50 TABLET ORAL at 00:06

## 2024-07-22 RX ADMIN — DIVALPROEX SODIUM 500 MG: 500 TABLET, DELAYED RELEASE ORAL at 19:51

## 2024-07-22 RX ADMIN — HYDROXYZINE HYDROCHLORIDE 50 MG: 25 TABLET ORAL at 00:06

## 2024-07-22 RX ADMIN — TRAZODONE HYDROCHLORIDE 50 MG: 50 TABLET ORAL at 19:51

## 2024-07-22 ASSESSMENT — ACTIVITIES OF DAILY LIVING (ADL)
ADLS_ACUITY_SCORE: 28
HYGIENE/GROOMING: INDEPENDENT
ADLS_ACUITY_SCORE: 28
ORAL_HYGIENE: INDEPENDENT
ADLS_ACUITY_SCORE: 28
DRESS: INDEPENDENT
ADLS_ACUITY_SCORE: 28

## 2024-07-22 NOTE — PLAN OF CARE
"  Problem: Disruptive Behavior  Goal: Enhanced Social, Occupational or Functional Skills (Disruptive Behavior)  Outcome: Progressing  Intervention: Promote Social, Occupational and Functional Ability  Recent Flowsheet Documentation  Taken 7/21/2024 2100 by Ashley Haynes RN  Trust Relationship/Rapport:   care explained   choices provided   Goal Outcome Evaluation:    Plan of Care Reviewed With: patient    Patient actively avoided eye contact with writer all shift; declined offers of nicotine, PRN medication, and check-in stating \"everything's fine.\"  He is noticeably malodorous.  When writer gave him his 8 pm medication he stated \"why is zyprexa in here?  I didn't request that\".    I told him that it is a scheduled medication and patient replied \"I'm going to ask the provider to switch it to Abilify then.\"  Later he attended evening group run by psych associate and was cooperative and verbal.  /83 (BP Location: Left arm, Patient Position: Sitting, Cuff Size: Adult Regular)   Pulse 86   Temp 98  F (36.7  C) (Oral)   Resp 20   Ht 1.778 m (5' 10\")   Wt 65.3 kg (144 lb)   SpO2 100%   BMI 20.66 kg/m                   "
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
## 2024-07-22 NOTE — PLAN OF CARE
Upcoming Meetings and Dates/Important Information  Days since IP admission 5  Team Note Due Thursday  No future appointments.     Next Steps  Fin Counseling    Assessment/Intervention/Current Symptoms and Care Coordination  Chart Review  Met with team to discuss patient care.  Financial counseling note 7/19: Per [employer], coverage inactive. Pending acct 1 business day to re-check coverage.     Discharge Plan or Goal  Dispo Plan: IRTS  Transportation: TBD  Medication: TBD    Provisional discharge: Not needed  Safety plan complete?: Not yet completed  Appointments:   TBD     Barriers to Discharge  patient requires further psychiatric stabilization due to current symptomology, medication management with possible adjustments    Expected Discharge Date: 07/26/2024        Discharge Comments: Wants IRTS when stable        Referral Status  No new referral made    Legal Status  voluntary but holdable    Contacts  Robert Rodas Father [phone]

## 2024-07-22 NOTE — PLAN OF CARE
"Pt came out of room irritable and raising his voice. Pt stating \"How the fuck am I supposed to sleep if you keep coming in my room every 3 minutes.\" Pt heard banging chairs in [facility] and this writer discussed pt's frustration. Pt stated his door is open and upset someone is shining the light on him waking him up. Pt agreeable to hydroxyzine and trazodone. Medication given without incident. Pt stating he refuses to go back to his room and pt informed no one is forcing him to go back to his room at this time. Pt repeated several times hs is frustrated. Pt assured light will not be directed at his head and will check on patient best staff can without waking patient up and will not enter his room as this escalates patient. Pt stating he is keeping his door open so we can see patient.    "

## 2024-07-22 NOTE — PLAN OF CARE
"  Problem: Sleep Disturbance  Goal: Adequate Sleep/Rest  Outcome: Progressing   Goal Outcome Evaluation:    Patient slept 5 hours During the night. Safety checks was done every 15 minutes. No PRN was given. During the 15 minutes round patient was very upset yelling Stated \"I cannot sleep with people coming to my room,came to the lounge area\" Staff was able to talked to him calmly and offer PRN Hydroxyzine 25 mg and Trazodone 50 mg at 0006 was given                         "

## 2024-07-22 NOTE — PROGRESS NOTES
Alexi agreed to attend community meeting. He participated in the group prompt, sharing what coping mechanism and person he turns to when he is struggling. He smiled several time in group and joked with a peer.

## 2024-07-22 NOTE — PLAN OF CARE
"  Problem: Adult Inpatient Plan of Care  Goal: Absence of Hospital-Acquired Illness or Injury  Outcome: Progressing     Problem: Suicide Risk  Goal: Absence of Self-Harm  Outcome: Progressing       Pt remains tense at times. He occasionally demonstrates some humor. Eye contact remains intermittent. Pt seemed to be less hypervigilant as the day progressed. When writer asked pt about his mood he stated: \"Well, I could be worse. You have to wake up each day and decide to leave the bad stuff behind.\"     Pt is compliant with medications. Denies pain. Appetite is good.     Gudelia Palacio RN                        "

## 2024-07-23 LAB
FOLATE SERPL-MCNC: 8.7 NG/ML (ref 4.6–34.8)
VALPROATE SERPL-MCNC: 64.1 UG/ML

## 2024-07-23 PROCEDURE — H2032 ACTIVITY THERAPY, PER 15 MIN: HCPCS | Performed by: PSYCHOLOGIST

## 2024-07-23 PROCEDURE — 250N000013 HC RX MED GY IP 250 OP 250 PS 637: Performed by: REGISTERED NURSE

## 2024-07-23 PROCEDURE — 80164 ASSAY DIPROPYLACETIC ACD TOT: CPT | Performed by: REGISTERED NURSE

## 2024-07-23 PROCEDURE — 82746 ASSAY OF FOLIC ACID SERUM: CPT | Performed by: PSYCHIATRY & NEUROLOGY

## 2024-07-23 PROCEDURE — 36415 COLL VENOUS BLD VENIPUNCTURE: CPT | Performed by: REGISTERED NURSE

## 2024-07-23 PROCEDURE — 90853 GROUP PSYCHOTHERAPY: CPT | Performed by: COUNSELOR

## 2024-07-23 PROCEDURE — G0177 OPPS/PHP; TRAIN & EDUC SERV: HCPCS

## 2024-07-23 PROCEDURE — 124N000002 HC R&B MH UMMC

## 2024-07-23 PROCEDURE — 99232 SBSQ HOSP IP/OBS MODERATE 35: CPT | Performed by: REGISTERED NURSE

## 2024-07-23 RX ORDER — VITAMIN B COMPLEX
50 TABLET ORAL DAILY
Status: DISCONTINUED | OUTPATIENT
Start: 2024-07-23 | End: 2024-07-29 | Stop reason: HOSPADM

## 2024-07-23 RX ORDER — HYDROXYZINE HYDROCHLORIDE 25 MG/1
25-50 TABLET, FILM COATED ORAL EVERY 4 HOURS PRN
Status: DISCONTINUED | OUTPATIENT
Start: 2024-07-23 | End: 2024-07-29 | Stop reason: HOSPADM

## 2024-07-23 RX ADMIN — DIVALPROEX SODIUM 250 MG: 250 TABLET, DELAYED RELEASE ORAL at 08:30

## 2024-07-23 RX ADMIN — NICOTINE POLACRILEX 4 MG: 2 GUM, CHEWING BUCCAL at 08:31

## 2024-07-23 RX ADMIN — TRAZODONE HYDROCHLORIDE 50 MG: 50 TABLET ORAL at 22:15

## 2024-07-23 RX ADMIN — ARIPIPRAZOLE 10 MG: 10 TABLET ORAL at 08:30

## 2024-07-23 RX ADMIN — DIVALPROEX SODIUM 500 MG: 500 TABLET, DELAYED RELEASE ORAL at 19:35

## 2024-07-23 RX ADMIN — HYDROXYZINE HYDROCHLORIDE 50 MG: 25 TABLET, FILM COATED ORAL at 22:15

## 2024-07-23 RX ADMIN — Medication 50 MCG: at 12:49

## 2024-07-23 RX ADMIN — NICOTINE POLACRILEX 2 MG: 2 GUM, CHEWING BUCCAL at 16:34

## 2024-07-23 ASSESSMENT — ACTIVITIES OF DAILY LIVING (ADL)
ADLS_ACUITY_SCORE: 28
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
## 2024-07-23 NOTE — PLAN OF CARE
Goal Outcome Evaluation:    Problem: Sleep Disturbance  Goal: Adequate Sleep/Rest  Outcome: Progressing     Pt slept most of the night for 5.25 hours after which he lay in bed tossing and turning. No safety concerns observed or reported.
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
## 2024-07-23 NOTE — PLAN OF CARE
Upcoming Meetings and Dates/Important Information  Days since IP admission 6  Team Note Due Thursday  No future appointments.     Next Steps  Fin Counseling    Assessment/Intervention/Current Symptoms and Care Coordination  Chart Review    Met with team to discuss patient care.    Per team, financial counseling. [facility] will need additional information regarding his income. Natalio should call [facility] financial office at  Phone: [phone]. They will confirm what is needed to confirm next steps. His Case number [id number] I informed Natalio and he stated that he answered all pending questions that the [facility] needed. He stated that they referred him back to financial counseling     I contacted Andrew the financial counselor at [facility] with updates.I requested for Andrew to follow up with [facility] to process Navarro's MA francisco    Per nursing, Lizzie Dempsey with [facility] probate called to inquire if the pt is on the unit - [phone]    Discharge Plan or Goal  Dispo Plan: IRTS  Transportation: TBD  Medication: TBD    Provisional discharge: Not needed  Safety plan complete?: Not yet completed  Appointments:   TBD     Barriers to Discharge  patient requires further psychiatric stabilization due to current symptomology, medication management with possible adjustments    Expected Discharge Date: 08/02/2024        Discharge Comments: Wants IRTS when stable        Referral Status  No new referral made    Legal Status  voluntary but holdable    Contacts  Robert Rodas Father [phone]

## 2024-07-23 NOTE — PROGRESS NOTES
"[facility], [place]   Psychiatric Progress Note    Hospital Day: 6  Interim History:   From H&P:  This patient is a 28 year old male with a history of schizoaffective disorder, bipolar type, bipolar 1 disorder, trauma, TBI, ADHD, ODD, alcohol use disorder, and cannabis use disorder, who presented to [facility] ED on 7/12/2024 with aggression and behavioral dysregulation after ingesting 2.5g each of ibuprofen and Tylenol, in the context of medication nonadherence, cannabis use, and psychosocial stressors.     Team meeting report:  The patient's care was discussed with the treatment team during the daily team meeting and staff's chart notes were reviewed.   Patient attended groups.  He was appropriate, focused, and engaged.  He denied depression and anxiety.  Patient admitted to staff that he can sometimes be impatient.  Per CTC note, we are still awaiting approval of MA for patient.  Per staff documentation, patient slept 5.25 hours last night.  Patient took as needed trazodone and hydroxyzine for sleep.    Met with patient.  Patient reports he had a fairly good day yesterday.  He is enjoying groups and finds some of the groups \"fun\".   Patient reports experiencing \"a little bit\" of drowsiness with Abilify initiation yesterday.  He denies other side effects from medications.  Patient believes Abilify is beneficial.  Patient reports feeling more calm and stable, and \"not so wiry\" on Abilify.  Patient also believes Depakote has been helpful for stabilizing his mood.  He reports trazodone and hydroxyzine are beneficial for insomnia and anxiety.  He denies suicidal ideation, anxiety, or depression.  He feels as though his mood is \"leveling out\".  He continues to endorse paranoia, but states it has decreased from 7/10 to 5/10 (10 = worst) during his time in the hospital.  Patient also reports improvement in auditory hallucinations and visual hallucinations.  Patient states auditory hallucinations " typically occur once or twice per day, and visual hallucinations occur less frequently.  Patient denies command auditory hallucinations.  He does not usually find hallucinations to be bothersome or scary.  On exam, patient is more organized.  He continues to exhibit increased speech latency with thought blocking.    Depakote level today was 64.1.    Medications:     Current Facility-Administered Medications   Medication Dose Route Frequency Provider Last Rate Last Admin    ARIPiprazole (ABILIFY) tablet 10 mg  10 mg Oral Daily Millie Whaley APRN CNP   10 mg at 07/22/24 1120    divalproex sodium delayed-release (DEPAKOTE) DR tablet 250 mg  250 mg Oral QAM Millie Whaley APRN CNP   250 mg at 07/22/24 0815    divalproex sodium delayed-release (DEPAKOTE) DR tablet 500 mg  500 mg Oral Daily Millie Whaley APRN CNP   500 mg at 07/22/24 1951       Allergies:     Allergies   Allergen Reactions    Nsaids      PT CAN NOT TAKE WITH LITHIUM        Labs:     Recent Results (from the past 672 hour(s))   Comprehensive metabolic panel    Collection Time: 07/12/24  4:04 PM   Result Value Ref Range    Sodium 138 135 - 145 mmol/L    Potassium 3.7 3.4 - 5.3 mmol/L    Carbon Dioxide (CO2) 24 22 - 29 mmol/L    Anion Gap 12 7 - 15 mmol/L    Urea Nitrogen 10.6 6.0 - 20.0 mg/dL    Creatinine 1.02 0.67 - 1.17 mg/dL    GFR Estimate >90 >60 mL/min/1.73m2    Calcium 9.7 8.6 - 10.0 mg/dL    Chloride 102 98 - 107 mmol/L    Glucose 100 (H) 70 - 99 mg/dL    Alkaline Phosphatase 83 40 - 150 U/L    AST 27 0 - 45 U/L    ALT 17 0 - 70 U/L    Protein Total 7.6 6.4 - 8.3 g/dL    Albumin 4.8 3.5 - 5.2 g/dL    Bilirubin Total 0.5 <=1.2 mg/dL   TSH with free T4 reflex    Collection Time: 07/12/24  4:04 PM   Result Value Ref Range    TSH 0.66 0.30 - 4.20 uIU/mL   Salicylate level    Collection Time: 07/12/24  4:04 PM   Result Value Ref Range    Salicylate <0.3   mg/dL   Acetaminophen level    Collection Time: 07/12/24  4:04 PM    Result Value Ref Range    Acetaminophen 25.7 10.0 - 30.0 ug/mL   CBC with platelets and differential    Collection Time: 07/12/24  4:04 PM   Result Value Ref Range    WBC Count 7.9 4.0 - 11.0 10e3/uL    RBC Count 5.27 4.40 - 5.90 10e6/uL    Hemoglobin 16.0 13.3 - 17.7 g/dL    Hematocrit 48.4 40.0 - 53.0 %    MCV 92 78 - 100 fL    MCH 30.4 26.5 - 33.0 pg    MCHC 33.1 31.5 - 36.5 g/dL    RDW 13.0 10.0 - 15.0 %    Platelet Count 279 150 - 450 10e3/uL    % Neutrophils 74 %    % Lymphocytes 19 %    % Monocytes 5 %    % Eosinophils 1 %    % Basophils 1 %    % Immature Granulocytes 0 %    NRBCs per 100 WBC 0 <1 /100    Absolute Neutrophils 5.9 1.6 - 8.3 10e3/uL    Absolute Lymphocytes 1.5 0.8 - 5.3 10e3/uL    Absolute Monocytes 0.4 0.0 - 1.3 10e3/uL    Absolute Eosinophils 0.1 0.0 - 0.7 10e3/uL    Absolute Basophils 0.1 0.0 - 0.2 10e3/uL    Absolute Immature Granulocytes 0.0 <=0.4 10e3/uL    Absolute NRBCs 0.0 10e3/uL   Hemoglobin A1c    Collection Time: 07/12/24  4:04 PM   Result Value Ref Range    Hemoglobin A1C 5.8 (H) <5.7 %   Asymptomatic COVID-19 Virus (Coronavirus) by PCR Nose    Collection Time: 07/12/24  4:06 PM    Specimen: Nose; Swab   Result Value Ref Range    SARS CoV2 PCR Negative Negative   Urine Drug Screen Panel    Collection Time: 07/15/24  6:31 AM   Result Value Ref Range    Amphetamines Urine Screen Negative Screen Negative    Barbituates Urine Screen Negative Screen Negative    Benzodiazepine Urine Screen Negative Screen Negative    Cannabinoids Urine Screen Positive (A) Screen Negative    Cocaine Urine Screen Negative Screen Negative    Fentanyl Qual Urine Screen Negative Screen Negative    Opiates Urine Screen Negative Screen Negative    PCP Urine Screen Negative Screen Negative   EKG 12-lead, tracing only    Collection Time: 07/18/24  2:23 PM   Result Value Ref Range    Systolic Blood Pressure  mmHg    Diastolic Blood Pressure  mmHg    Ventricular Rate 75 BPM    Atrial Rate 75 BPM    NC Interval  "164 ms    QRS Duration 114 ms     ms    QTc 426 ms    P Axis 55 degrees    R AXIS 74 degrees    T Axis 38 degrees    Interpretation ECG       Sinus rhythm  Normal ECG  When compared with ECG of 10-SEP-2019 10:23,  No significant change was found  Confirmed by fellow Silver Vogel ([id number]) on 7/19/2024 10:02:31 AM  Confirmed by MD CHANA, SCOTTY ([id number]) on 7/20/2024 12:19:56 AM     Vitamin D Deficiency    Collection Time: 07/19/24  8:52 AM   Result Value Ref Range    Vitamin D, Total (25-Hydroxy) 17 (L) 20 - 50 ng/mL   Lipid panel reflex to direct LDL    Collection Time: 07/19/24  8:52 AM   Result Value Ref Range    Cholesterol 175 <200 mg/dL    Triglycerides 51 <150 mg/dL    Direct Measure HDL 66 >=40 mg/dL    LDL Cholesterol Calculated 99 <=100 mg/dL    Non HDL Cholesterol 109 <130 mg/dL   TSH with free T4 reflex    Collection Time: 07/19/24  8:52 AM   Result Value Ref Range    TSH 1.32 0.30 - 4.20 uIU/mL   Vitamin B12    Collection Time: 07/19/24  8:52 AM   Result Value Ref Range    Vitamin B12 955 232 - 1,245 pg/mL   Extra Red Top Tube    Collection Time: 07/19/24  8:52 AM   Result Value Ref Range    Hold Specimen JIC    Extra Purple Top Tube    Collection Time: 07/19/24  8:52 AM   Result Value Ref Range    Hold Specimen JIC        Psychiatric Examination:   Temp: 97.8  F (36.6  C) Temp src: Temporal BP: 117/73 Pulse: 82   Resp: 16 SpO2: 98 % O2 Device: None (Room air)    Weight is 144 lbs 0 oz  Body mass index is 20.66 kg/m .    Appearance:  awake, alert, adequately groomed, dressed in hospital scrubs, and appeared as age stated  Attitude:  cooperative  Eye Contact:  fair  Mood:  \"good\"  Affect:  blunted, tense, congruent with mood  Speech:  clear, coherent, increased latency at times  Psychomotor Behavior:  no evidence of tardive dyskinesia, dystonia, or tics  Thought Process:  linear, thought blocking  Associations:  no loose associations  Thought Content:  no evidence of suicidal ideation or homicidal " ideation, auditory hallucinations present, and no visual hallucinations present  Insight:  limited  Judgement:  poor  Orientation:  not formally tested, grossly intact   Attention Span and Concentration:  distractible, but able to be redirected  Recent and Remote Memory:  not formally tested, appears to be impaired    Precautions:     Behavioral Orders   Procedures    Assault precautions    Cheeking Precautions (behavioral units)    Code 1 - Restrict to Unit    Elopement precautions    Routine Programming     As clinically indicated    Status 15     Every 15 minutes.    Suicide precautions: Suicide Risk: MODERATE; Clinical rationale to override score: modification to the care environment     Patients on Suicide Precautions should have a Combination Diet ordered that includes a Diet selection(s) AND a Behavioral Tray selection for Safe Tray - with utensils, or Safe Tray - NO utensils       Order Specific Question:   Suicide Risk     Answer:   MODERATE     Order Specific Question:   Clinical rationale to override score:     Answer:   modification to the care environment       Diagnoses:   Primary Diagnoses:  Schizoaffective disorder, bipolar type, decompensated     Secondary Diagnoses:   Cannabis use disorder, severe  Tobacco use, r/o disorder  Alcohol use disorder, per chart  ADHD, per chart  ODD, per chart  PTSD, per patient report  Head injury with subdural hematoma       Hospital Course:     Plan on Admission:   Admit to [address]   PTA Zyprexa and nicotine gum, which were initiated in the ED, were continued.  PTA lithium, which was initiated in the ED, was not restarted.    New medications started at the time of admission include Depakote DR, hydroxyzine, Zyprexa, and trazodone.    Start Safety precautions:  suicide, cheeking, elopement, assault   Labs: TSH, lipids, UDS, CBC, CMP, HgbA1c, B12/folate, vitamin D  EKG     7/19:  No medication changes today.  7/22:  Discontinued bedtime Zyprexa.   Initiated aripiprazole 10mg daily.  Depakote level ordered for 7/22 AM.       Plan:   Today's Changes:  - Depakote level today was 64.1.   - No medication changes indicated at this time.  Patient appears to be stabilizing on current medication regimen.    Medications:  Target psychiatric symptoms and interventions:  # Psychosis   # Mood  - Continue aripiprazole 10mg daily  - Continue Depakote DR 250mg AM and Depakote DR 500mg PM    > 7/22 Depakote level: 64.1    # Anxiety   - Continue hydroxyzine 25-50mg q4h PRN for acute anxiety     # Tobacco Withdrawal  - Continue nicotine gum 2-4mg q 1hr PRN     # Insomnia  - Continue Trazodone 50mg at bedtime PRN for sleep disturbances     # Agitation  - Continue Zyprexa 5-10mg TID PRN for severe agitation     Risks, benefits, and alternatives discussed at length with patient.    Medical:  --Internal medicine to follow up for medical problems   --No acute medical concerns     Pertinent labs/imaging:  - New labs today     Consults:   --Care was coordinated with the treatment team.   --The patient was consulted on nature of illness and treatment options.      Disposition Plan   Reason for ongoing admission:   - is unable to care for self due to severe psychosis or soheila  Discharge location:   - [facility] facility  Discharge Medications:   - not ordered  Follow-up Appointments:   - not scheduled  Estimated Length of Stay:   - 7-10 days    Discharge will be granted once symptoms improved.    TAMARA Dyson, CNP     This note was created with the help of Dragon dictation system. All grammatical/typing errors or context distortion are unintentional and inherent to software.    Attestation:  Patient has been seen and evaluated by me, TAMARA Dyson, CNP.  I spent >35 min spent on the date of the encounter in chart review, patient visit, review of tests, documentation, care coordination, and/or discussion with other providers about the issues documented above.

## 2024-07-23 NOTE — PLAN OF CARE
"  Problem: Manic or Hypomanic Signs/Symptoms  Goal: Improved Impulse Control (Manic/Hypomanic Signs/Symptoms)  Outcome: Progressing   Goal Outcome Evaluation:    Plan of Care Reviewed With: patient    Patient denied SI but when asked about depression and anxiety stated he is actually melancholy.  He has been more social with peers in the milieu the last two evenings and attended groups.  Patient admitted he can be impatient when another staff asked if they could help him while I was unwrapping his medications.  Patient denied all physical problems, hallucinations, and medication side effects but requested and received PRN Trazodone and Hydroxyzine \"to knock me out.\"  /81 (Patient Position: Sitting)   Pulse 78   Temp 98.1  F (36.7  C) (Oral)   Resp 16   Ht 1.778 m (5' 10\")   Wt 65.3 kg (144 lb)   SpO2 100%   BMI 20.66 kg/m               "

## 2024-07-23 NOTE — PLAN OF CARE
"  Problem: Adult Behavioral Health Plan of Care  Goal: Optimized Coping Skills in Response to Life Stressors  Outcome: Progressing     Problem: Suicide Risk  Goal: Absence of Self-Harm  Outcome: Progressing       Pt is guarded, tense at times. Pt does best with extra time to process information/requests from staff due to being skeptical, suspicious. Pt was started on Vitamin D today and seemed hesitant but took tabs and said: \"I really think there should be another way to do this--this is why I need to be outside.\" Pt is compliant with meds and with morning blood draw.    Pt attends OT groups. No new concerns.    Gudelia Palacio RN                       "

## 2024-07-24 PROCEDURE — 124N000002 HC R&B MH UMMC

## 2024-07-24 PROCEDURE — 250N000013 HC RX MED GY IP 250 OP 250 PS 637: Performed by: REGISTERED NURSE

## 2024-07-24 PROCEDURE — 99232 SBSQ HOSP IP/OBS MODERATE 35: CPT | Performed by: REGISTERED NURSE

## 2024-07-24 PROCEDURE — 90832 PSYTX W PT 30 MINUTES: CPT | Performed by: COUNSELOR

## 2024-07-24 RX ADMIN — NICOTINE POLACRILEX 2 MG: 2 GUM, CHEWING BUCCAL at 16:45

## 2024-07-24 RX ADMIN — DIVALPROEX SODIUM 500 MG: 500 TABLET, DELAYED RELEASE ORAL at 19:51

## 2024-07-24 RX ADMIN — ARIPIPRAZOLE 10 MG: 10 TABLET ORAL at 07:56

## 2024-07-24 RX ADMIN — TRAZODONE HYDROCHLORIDE 50 MG: 50 TABLET ORAL at 19:51

## 2024-07-24 RX ADMIN — HYDROXYZINE HYDROCHLORIDE 25 MG: 25 TABLET, FILM COATED ORAL at 13:32

## 2024-07-24 RX ADMIN — HYDROXYZINE HYDROCHLORIDE 50 MG: 25 TABLET, FILM COATED ORAL at 19:51

## 2024-07-24 RX ADMIN — NICOTINE POLACRILEX 2 MG: 2 GUM, CHEWING BUCCAL at 07:56

## 2024-07-24 RX ADMIN — Medication 50 MCG: at 07:56

## 2024-07-24 RX ADMIN — NICOTINE POLACRILEX 2 MG: 2 GUM, CHEWING BUCCAL at 11:15

## 2024-07-24 RX ADMIN — DIVALPROEX SODIUM 250 MG: 250 TABLET, DELAYED RELEASE ORAL at 07:56

## 2024-07-24 ASSESSMENT — ACTIVITIES OF DAILY LIVING (ADL)
ADLS_ACUITY_SCORE: 28
LAUNDRY: WITH SUPERVISION
ADLS_ACUITY_SCORE: 28
DRESS: INDEPENDENT;SCRUBS (BEHAVIORAL HEALTH)
HYGIENE/GROOMING: INDEPENDENT
ADLS_ACUITY_SCORE: 28
ORAL_HYGIENE: INDEPENDENT
ADLS_ACUITY_SCORE: 28
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
## 2024-07-24 NOTE — PLAN OF CARE
"Goal Outcome Evaluation:    Plan of Care Reviewed With: patient      Pt is visible in the milieu watching TV and interacting with select peers. Denies pain. Endorsed anxiety and depression but stated after talking with mother earlier, he feels much better. Denies SI/HI AVH but claimed he accidentally hit his elbow and the pain felt good. Pt stated \"does that make me look wiered? Reports mood is \"fine, I'm content\" Behavior is calm and cooperative. Requested and received PRN Hydroxyzine and trazodone with bedtime meds. No safety/behavior concerns at this time.  /80 (BP Location: Left arm, Patient Position: Sitting, Cuff Size: Adult Regular)   Pulse 72   Temp 97.8  F (36.6  C) (Temporal)   Resp 16   Ht 1.778 m (5' 10\")   Wt 65.3 kg (144 lb)   SpO2 100%   BMI 20.66 kg/m      "
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
## 2024-07-24 NOTE — PLAN OF CARE
Problem: Manic or Hypomanic Signs/Symptoms  Goal: Improved Impulse Control (Manic/Hypomanic Signs/Symptoms)  Outcome: Progressing   Goal Outcome Evaluation:    Plan of Care Reviewed With: patient      Pt was visible in the milieu, presented with flat affect. Pleasant on approach, paucity of words. Denies pain and all MH symptoms. Stated he likes to keep his mind by chewing nicotine gum. Ate adequately for dinner, medication compliant, prn nicotine gum X1, 50 mg of hydroxyzine, and 50 mg of Trazodone.  Attended group. Plan of care on going, will continue to monitor.  Vital signs:  Temp: 98.5  F (36.9  C) Temp src: Oral BP: 134/84 Pulse: 89   Resp: 16 SpO2: 99 % O2 Device: None (Room air)

## 2024-07-24 NOTE — PLAN OF CARE
"Goal Outcome Evaluation:    Problem: Sleep Disturbance  Goal: Adequate Sleep/Rest  Outcome: Progressing     Pt was awakened once by the overhead page of code 21, came out irritable, mumbling \" they do this shit all the time\". Pt walked to the quiet place by the lounge, sat there for a short while and then returned to his room and was able to back to sleep. Pt had a total of 6 house. No other concerns observed.                      "

## 2024-07-24 NOTE — PLAN OF CARE
Upcoming Meetings and Dates/Important Information  Days since IP admission 7  Team Note Due Thursday  [facility] interview around 2:30PM on Friday 7/26/24 in person with Oly Peña IRTS director     Next Steps  IRTS    Assessment/Intervention/Current Symptoms and Care Coordination  Chart Review  Met with team to discuss patient care.  Received note on my desk this morning that Alexi's mother, [place] ins requesting a call from me. [phone]  Per financial counseling patient is insured under [place].  Pt signed ROIs for IRTS facilities, his mother, and probation.  Left voicemail message with PO Lizzie stating pt is admitted and plan is to discharge to an IRTS. Left contact information.  Left voicemail message with pt's mother at [phone] returning her requested call. She returned my call and I provided her with an update.  Referrals for IRTS made, [facility], [facility], [facility], [facility], [facility], and [facility].  Oly at  reached out and scheduled interview for Friday around 230p    Discharge Plan or Goal  Dispo Plan: IRTS  Transportation: TBD  Medication: TBD    Provisional discharge: Not needed  Safety plan complete?: Not yet completed  Appointments:   TBD     Barriers to Discharge  patient requires further psychiatric stabilization due to current symptomology, medication management with possible adjustments    Expected Discharge Date: 08/02/2024        Discharge Comments: Wants IRTS when stable        Referral Status  Referrals for IRTS made 7/24  [facility] [address] [facility] [address] P [phone] F [phone] [email or web address] [email or web address] [phone]  [facility] [place] [address] [facility] [address] P [phone] F [phone] Teresa del [email or web address]  [facility] [address], [place] [address] P [phone] F [phone] Oly Singer, MS, [email or web address]  [facility] [address] [place] [address], [place] [address] P [phone] F [phone] Savannah [email or web address]  [facility] [address], [place], [place], [address] P [phone] F [phone]  [facility] P [phone] F [phone] [email or web address]  [facility] [address] [facility] [address]  [facility] [address] [place] [address]  [facility] [address] [facility] [address]  [facility] [address] [place] [address]  [facility] [place] [address] [place] [address] [place] [address]  [facility] [address] [place] [address]  [facility] [address] [facility] [address]  [facility] [address] [facility] [address]  [facility] P [phone] F [phone] [email or web address]  [facility] [address] [facility] [address]  [facility] [address] [facility] [address]  [facility] [address] [facility] [address]     Legal Status  voluntary    Contacts  Robert Rodas (Father) [phone] (BATSHEVA signed)  [place] (mother) [phone] (BATSHEVA signed)     Probation [facility] [phone]
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
## 2024-07-25 ENCOUNTER — TELEPHONE (OUTPATIENT)
Dept: BEHAVIORAL HEALTH | Facility: CLINIC | Age: 29
End: 2024-07-25
Payer: MEDICAID

## 2024-07-25 PROCEDURE — 99232 SBSQ HOSP IP/OBS MODERATE 35: CPT | Performed by: REGISTERED NURSE

## 2024-07-25 PROCEDURE — 124N000002 HC R&B MH UMMC

## 2024-07-25 PROCEDURE — 250N000013 HC RX MED GY IP 250 OP 250 PS 637: Performed by: REGISTERED NURSE

## 2024-07-25 RX ORDER — SOAP
1 BAR TOPICAL DAILY
Status: DISCONTINUED | OUTPATIENT
Start: 2024-07-25 | End: 2024-07-29 | Stop reason: HOSPADM

## 2024-07-25 RX ORDER — ARIPIPRAZOLE 10 MG/1
10 TABLET ORAL DAILY
Qty: 30 TABLET | Refills: 0 | Status: SHIPPED | OUTPATIENT
Start: 2024-07-26 | End: 2024-08-23

## 2024-07-25 RX ORDER — DIVALPROEX SODIUM 500 MG/1
500 TABLET, DELAYED RELEASE ORAL AT BEDTIME
Qty: 30 TABLET | Refills: 0 | Status: SHIPPED | OUTPATIENT
Start: 2024-07-25 | End: 2024-08-23

## 2024-07-25 RX ORDER — SOAP
1 BAR TOPICAL DAILY
Qty: 1 EACH | Refills: 0 | Status: SHIPPED | OUTPATIENT
Start: 2024-07-26

## 2024-07-25 RX ORDER — HYDROXYZINE HYDROCHLORIDE 25 MG/1
25 TABLET, FILM COATED ORAL 3 TIMES DAILY PRN
Qty: 90 TABLET | Refills: 0 | Status: SHIPPED | OUTPATIENT
Start: 2024-07-25 | End: 2024-08-23

## 2024-07-25 RX ORDER — TRAZODONE HYDROCHLORIDE 50 MG/1
50 TABLET, FILM COATED ORAL
Qty: 30 TABLET | Refills: 0 | Status: SHIPPED | OUTPATIENT
Start: 2024-07-25 | End: 2024-08-23

## 2024-07-25 RX ORDER — DIVALPROEX SODIUM 250 MG/1
250 TABLET, DELAYED RELEASE ORAL EVERY MORNING
Qty: 30 TABLET | Refills: 0 | Status: SHIPPED | OUTPATIENT
Start: 2024-07-26 | End: 2024-08-23

## 2024-07-25 RX ORDER — VITAMIN B COMPLEX
50 TABLET ORAL DAILY
Qty: 60 TABLET | Refills: 0 | Status: SHIPPED | OUTPATIENT
Start: 2024-07-26

## 2024-07-25 RX ADMIN — DIVALPROEX SODIUM 500 MG: 500 TABLET, DELAYED RELEASE ORAL at 21:09

## 2024-07-25 RX ADMIN — NICOTINE POLACRILEX 2 MG: 2 GUM, CHEWING BUCCAL at 10:41

## 2024-07-25 RX ADMIN — HYDROXYZINE HYDROCHLORIDE 50 MG: 25 TABLET, FILM COATED ORAL at 19:21

## 2024-07-25 RX ADMIN — DIVALPROEX SODIUM 250 MG: 250 TABLET, DELAYED RELEASE ORAL at 08:00

## 2024-07-25 RX ADMIN — ARIPIPRAZOLE 10 MG: 10 TABLET ORAL at 08:00

## 2024-07-25 RX ADMIN — NICOTINE POLACRILEX 2 MG: 2 GUM, CHEWING BUCCAL at 16:56

## 2024-07-25 RX ADMIN — NICOTINE POLACRILEX 2 MG: 2 GUM, CHEWING BUCCAL at 06:50

## 2024-07-25 RX ADMIN — Medication 50 MCG: at 08:00

## 2024-07-25 ASSESSMENT — ACTIVITIES OF DAILY LIVING (ADL)
ADLS_ACUITY_SCORE: 28
ADLS_ACUITY_SCORE: 28
ORAL_HYGIENE: INDEPENDENT
ADLS_ACUITY_SCORE: 28
HYGIENE/GROOMING: INDEPENDENT
LAUNDRY: WITH SUPERVISION
ADLS_ACUITY_SCORE: 28
LAUNDRY: WITH SUPERVISION
ADLS_ACUITY_SCORE: 28
ORAL_HYGIENE: INDEPENDENT
ADLS_ACUITY_SCORE: 28
DRESS: INDEPENDENT
ADLS_ACUITY_SCORE: 28
HYGIENE/GROOMING: INDEPENDENT
ADLS_ACUITY_SCORE: 28
DRESS: INDEPENDENT
ADLS_ACUITY_SCORE: 28

## 2024-07-25 NOTE — TELEPHONE ENCOUNTER
----- Message from Parul Hair sent at 7/25/2024  1:08 PM CDT -----  Regarding: Post hospitalization gap  Transition Clinic Referral   [place]       Please Check Type of Referral Requested:       __X__THERAPY: The Transition clinic is able to schedule patients without current medical insurance; these patient will be referred to our Social Work Care Coordinator for Medical Insurance              Assistance. We are open for referral for psychotherapy. Patient is referred from: Inpatient Mental Health Unit at [facility]      __X__MEDICATION:   Referrals for Medication are ONLY accepted from the following areas (select): Inpatient Mental Health Unit - HIGH PRIORITY                                      Suboxone and Opioid Management Referrals are automatically denied.   TC Psychiatry cannot see patient without active medical insurance.   Next level of psychiatry care must be within 12 months.  TC Psychiatry cannot accept patient with next level of care scheduled with PCP.  The transition clinic cannot follow patients who are on a restricted recipient program.    __ Long-Acting Injection (MILLIGAN)?    Is referred patient receiving a long-acting injection (MILLIGAN)?  If YES, provide the following:    Name and dose of Medication: n  Date Injection was last administered to patient: n  Next Long- Acting injection Due Date: n    Is referred patient transferring from [facility]?  If YES, provide the following:     ___  MILLIGAN will be receiving MILLIGAN at [facility] [place]  ___  MILLIGAN will be administered per an IRTS or elsewhere in the community      GUARDIAN: If your patient is not their own Guardian, please provide the following:    Guardian Name:  Guardian Contact Information (Phone & Email) :  Guardian Address:     FOSTER CARE PROVIDER: If your patient lives at a Licensed Foster Care, please provide the following:    Foster Provider:  Foster Provider Contact Information (Phone & Email):  Foster Provider  Address:     Referring Provider Contact Name: DIANNE Johns [facility]; Phone Number: [phone]    Reason for Transition Clinic Referral: Gap appointments until he can get into [facility] OP    Next Level of Care Patient Will Be Transitioned To: [facility] OP - 9035 Order placed.  Provider(s)TBD  Location TBD  Date/Time TBD    What Would Be Helpful from the Transition Clinic: Needs medication provider and interested in therapy     Needs:NO    Does Patient Have Access to Technology: Likely    Patient E-mail Address: [email or web address]    Current Patient Phone Number: [phone]; He is discharging to an IRTS on 7/29/24 and this phone number is the facility phone number    Clinician Gender Preference (if applicable): NO    Patient location preference:In person    DIANNE Ramos, [facility]    (Master Form: Updated 11/28/2023)
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
## 2024-07-25 NOTE — PLAN OF CARE
Problem: Adult Inpatient Plan of Care  Goal: Optimal Comfort and Wellbeing  7/25/2024 1717 by Jacki Diaz, RN  Outcome: Progressing  7/25/2024 1602 by Jacki Diaz, RN  Outcome: Progressing   Goal Outcome Evaluation:    Pt napping at start of shift and was up soon after. He watches t.v. and is appropriately social with peers. Pt said he had an interview at an IRTS and he likes the location of it. Ate 50% of meal. He denies pain, depression, no SI/HA. He reports feeling anxious and asks for a stress ball which was provided. He later asked for Hydroxyzine 50 mg. Attends evening group and had good participation.

## 2024-07-25 NOTE — TELEPHONE ENCOUNTER
Writer spoke with pt and scheduled initial TC therapy appointment on 08/01/2024 @  2:00 pm and TC psychiatry on 08/13/2024 @ 2:30 pm .Tracker completed.    Carine Gee  07/25/2024  208

## 2024-07-25 NOTE — PLAN OF CARE
Goal Outcome Evaluation:       Patient was in bed at the beginning of the shift, breathing quietly and unlabored. Pt slept 5.25 hrs  No concerns reported or noted this shift.  Will continue to Monitor.  PRNs:None
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
## 2024-07-26 PROCEDURE — 124N000002 HC R&B MH UMMC

## 2024-07-26 PROCEDURE — 90853 GROUP PSYCHOTHERAPY: CPT | Performed by: COUNSELOR

## 2024-07-26 PROCEDURE — 250N000013 HC RX MED GY IP 250 OP 250 PS 637: Performed by: REGISTERED NURSE

## 2024-07-26 PROCEDURE — G0177 OPPS/PHP; TRAIN & EDUC SERV: HCPCS

## 2024-07-26 PROCEDURE — 99232 SBSQ HOSP IP/OBS MODERATE 35: CPT | Performed by: NURSE PRACTITIONER

## 2024-07-26 RX ADMIN — TRAZODONE HYDROCHLORIDE 50 MG: 50 TABLET ORAL at 20:49

## 2024-07-26 RX ADMIN — DIVALPROEX SODIUM 250 MG: 250 TABLET, DELAYED RELEASE ORAL at 08:24

## 2024-07-26 RX ADMIN — Medication 1 EACH: at 12:39

## 2024-07-26 RX ADMIN — DIVALPROEX SODIUM 500 MG: 500 TABLET, DELAYED RELEASE ORAL at 20:48

## 2024-07-26 RX ADMIN — HYDROXYZINE HYDROCHLORIDE 25 MG: 25 TABLET, FILM COATED ORAL at 20:49

## 2024-07-26 RX ADMIN — Medication 50 MCG: at 08:24

## 2024-07-26 RX ADMIN — NICOTINE POLACRILEX 2 MG: 2 GUM, CHEWING BUCCAL at 15:06

## 2024-07-26 RX ADMIN — NICOTINE POLACRILEX 2 MG: 2 GUM, CHEWING BUCCAL at 08:47

## 2024-07-26 RX ADMIN — ARIPIPRAZOLE 10 MG: 10 TABLET ORAL at 08:24

## 2024-07-26 ASSESSMENT — ACTIVITIES OF DAILY LIVING (ADL)
ADLS_ACUITY_SCORE: 28
ADLS_ACUITY_SCORE: 28
DRESS: INDEPENDENT
ADLS_ACUITY_SCORE: 28
ORAL_HYGIENE: INDEPENDENT
ADLS_ACUITY_SCORE: 28
DRESS: INDEPENDENT
ADLS_ACUITY_SCORE: 28
LAUNDRY: WITH SUPERVISION
ADLS_ACUITY_SCORE: 28
ADLS_ACUITY_SCORE: 28
LAUNDRY: WITH SUPERVISION
ADLS_ACUITY_SCORE: 28
HYGIENE/GROOMING: INDEPENDENT
ORAL_HYGIENE: INDEPENDENT
ADLS_ACUITY_SCORE: 28
HYGIENE/GROOMING: INDEPENDENT
ADLS_ACUITY_SCORE: 28

## 2024-07-26 NOTE — PLAN OF CARE
"Goal Outcome Evaluation:    Plan of Care Reviewed With: patient      Patient slept 7 hours over night.  He was mostly withdrawn, does not interact much with peers or staff.  He seems to have trouble shifting gears at times - he was a bit irritated when approached with medication during breakfast but did eventually take the pills.  Looking forward to discharge planned for Monday, 7/29.  He is somewhat irritated by other patients on the unit but is able to keep his emotion in check by using coping skills.  Contracts for safety in the hospital and agrees to inform staff if he is experiencing any disconforts.  Will continue to monitor.  /82 (BP Location: Left arm)   Pulse 88   Temp 98  F (36.7  C) (Temporal)   Resp 16   Ht 1.778 m (5' 10\")   Wt 66.6 kg (146 lb 12.8 oz)   SpO2 99%   BMI 21.06 kg/m      "
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
## 2024-07-26 NOTE — PLAN OF CARE
Upcoming Meetings and Dates/Important Information  Days since IP admission 9  Team Note Due Thursday    Next Steps  Discharge cab    Assessment/Intervention/Current Symptoms and Care Coordination  Chart Review  Met with team to discuss patient care.  Updated AVS  Sent completed intake documents and follow up appointments to [facility].  Provided pt with his list of upcoming appointments.  Updated AVS.  Per care coordination, [facility] changed their policy so hospital discharge rides are only same day.  Called pt's mother and provided her an update that pt will be discharged on Monday to [facility].  Completed [facility] Diagnostic Assessment and submitted  referral for TCM.    Discharge Plan or Goal  Dispo Plan: [facility] [address] [address]   Transportation: Medical cab-need to schedule  Medication: 30 days in hand  Provisional discharge: Not needed  Safety plan complete?: Completed 7/18  Appointments: Referral to [facility]AS and transition clinic placed.    Barriers to Discharge  patient requires further psychiatric stabilization due to current symptomology, medication management with possible adjustments    Expected Discharge Date: 07/29/2024,  9:45 AM      Discharge Comments: [facility]        Referral Status  Referrals for IRTS made 7/24  [facility] [address] [facility] [address] P [phone] F [phone] [email or web address] [email or web address] [phone]  [facility] [address] [address] P [phone] F [phone] [email or web address]  [facility] [address], [place] [address] P [phone] F [phone] Oly Singer, MS, [facility] [email or web address]  [facility] [address], [place] [address] P [phone] F [phone] Savannah [email or web address]  [facility] [address], [place], [place], [address] P [phone] F [phone]  [facility] Admissions P [phone] F [phone] [email or web address]  [facility] [address] [place] [address]  [facility] [address] [place] [address]  [facility] [address] [place] [address]  [facility] [address] [place] [address]  [facility] [place] [address] [place] [address]  [facility] [address] [place] [address]  [facility] [address] [place] [address]  [facility] [address] [facility] [address]  [facility] P [phone] F [phone] [email or web address]  [facility] [address] [place] [address]  [facility] [address] [facility] [address]  [facility] [address] [facility] [address]     Legal Status  voluntary    Contacts  Robert Rodas (Father) [phone] (BATSHEVA signed)  [place] (mother) [phone] (BATSHEVA signed)     Probation [facility]   Shonna Dempsey [phone]

## 2024-07-26 NOTE — PLAN OF CARE
Problem: Adult Inpatient Plan of Care  Goal: Optimal Comfort and Wellbeing  Outcome: Progressing   Goal Outcome Evaluation:    Plan of Care Reviewed With: patient      Pt naps briefly at start of shift. Watches t.v.,listens to headphones and plays video game with a peer. Is excited about going on Monday. Said his dad has plans to visit him tomorrow. Talks about having communication problems with him in the past. Pt said he is doing better now and does not think this will be a big deal for him and that his dad will bring him food. Pt has mild elbow discomfort, said he slept on it wrong. Would like to give it time to see if pain resolves on its own, if not may want something for it. Pt said he felt anxious during group today and that staying in group helped to lessen anxiety. He denied feeling depressed, no SI/HA. Prn's Hydroxyzine and Trazodone given upon request.
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
## 2024-07-27 PROCEDURE — G0177 OPPS/PHP; TRAIN & EDUC SERV: HCPCS

## 2024-07-27 PROCEDURE — 124N000002 HC R&B MH UMMC

## 2024-07-27 PROCEDURE — 250N000013 HC RX MED GY IP 250 OP 250 PS 637: Performed by: REGISTERED NURSE

## 2024-07-27 RX ADMIN — DIVALPROEX SODIUM 500 MG: 500 TABLET, DELAYED RELEASE ORAL at 20:17

## 2024-07-27 RX ADMIN — NICOTINE POLACRILEX 2 MG: 2 GUM, CHEWING BUCCAL at 08:52

## 2024-07-27 RX ADMIN — Medication 1 EACH: at 07:47

## 2024-07-27 RX ADMIN — DIVALPROEX SODIUM 250 MG: 250 TABLET, DELAYED RELEASE ORAL at 07:47

## 2024-07-27 RX ADMIN — ARIPIPRAZOLE 10 MG: 10 TABLET ORAL at 07:47

## 2024-07-27 RX ADMIN — NICOTINE POLACRILEX 2 MG: 2 GUM, CHEWING BUCCAL at 13:47

## 2024-07-27 RX ADMIN — Medication 50 MCG: at 07:47

## 2024-07-27 RX ADMIN — NICOTINE POLACRILEX 2 MG: 2 GUM, CHEWING BUCCAL at 18:40

## 2024-07-27 ASSESSMENT — ACTIVITIES OF DAILY LIVING (ADL)
ADLS_ACUITY_SCORE: 28
LAUNDRY: WITH SUPERVISION
ADLS_ACUITY_SCORE: 28
ORAL_HYGIENE: INDEPENDENT
ADLS_ACUITY_SCORE: 28
DRESS: INDEPENDENT
ADLS_ACUITY_SCORE: 28
HYGIENE/GROOMING: INDEPENDENT
ADLS_ACUITY_SCORE: 28

## 2024-07-27 NOTE — PLAN OF CARE
Goal Outcome Evaluation:         Pt appeared to sleep 7 hours during the night, no problems reported, no PRN's given on Status 15 minute safety checks.

## 2024-07-27 NOTE — PROGRESS NOTES
Pt showers after lunch. Pt continues to be present in milieu and participates in some physical exercise with encouragement from PA staff. Pt's mood is stable, polite. Pt has one brief episode of irritability when PA staff knock on shower door to check on him.     Gudelia Palacio RN

## 2024-07-27 NOTE — PLAN OF CARE
"  Problem: Adult Inpatient Plan of Care  Goal: Optimal Comfort and Wellbeing  Outcome: Progressing     Problem: Suicide Risk  Goal: Absence of Self-Harm  Outcome: Progressing       Pt in milieu listening to headphones at start of shift. When this writer approached him, he smirked and said \"No, no, I want a different nurse.\" Pt smiled and stated that he was joking. Pt was compliant with his medications. Pt was briefly concerned that his Abilify was not in the medication cup. Writer was able to reassure him by showing him the open packages. \"Thank you. You know that I can be paranoid about my meds.\"     Pt has been in milieu this morning and is participating in PA-organized activities, bean bag toss, etc.    Gudelia Palacio RN                       "

## 2024-07-27 NOTE — PLAN OF CARE
Problem: Adult Inpatient Plan of Care  Goal: Optimal Comfort and Wellbeing  Outcome: Progressing   Goal Outcome Evaluation:    Plan of Care Reviewed With: patient       Watches t.v. and listens to headphones. Is appropriately social with peers. Has mild left shoulder pain, declines analgesic. Said he is depressed after thinking about family that have passed, no SI. Given essential oils- frankincense upon request. Eat adequately at meal. Requests nicotine gum x 1. Goes to evening group late.

## 2024-07-28 PROCEDURE — 250N000013 HC RX MED GY IP 250 OP 250 PS 637: Performed by: REGISTERED NURSE

## 2024-07-28 PROCEDURE — 124N000002 HC R&B MH UMMC

## 2024-07-28 RX ADMIN — ACETAMINOPHEN 650 MG: 325 TABLET, FILM COATED ORAL at 16:49

## 2024-07-28 RX ADMIN — TRAZODONE HYDROCHLORIDE 50 MG: 50 TABLET ORAL at 20:57

## 2024-07-28 RX ADMIN — HYDROXYZINE HYDROCHLORIDE 25 MG: 25 TABLET, FILM COATED ORAL at 20:57

## 2024-07-28 RX ADMIN — NICOTINE POLACRILEX 2 MG: 2 GUM, CHEWING BUCCAL at 13:30

## 2024-07-28 RX ADMIN — ARIPIPRAZOLE 10 MG: 10 TABLET ORAL at 07:46

## 2024-07-28 RX ADMIN — NICOTINE POLACRILEX 2 MG: 2 GUM, CHEWING BUCCAL at 09:47

## 2024-07-28 RX ADMIN — NICOTINE POLACRILEX 2 MG: 2 GUM, CHEWING BUCCAL at 07:02

## 2024-07-28 RX ADMIN — DIVALPROEX SODIUM 500 MG: 500 TABLET, DELAYED RELEASE ORAL at 20:02

## 2024-07-28 RX ADMIN — Medication 50 MCG: at 07:46

## 2024-07-28 RX ADMIN — DIVALPROEX SODIUM 250 MG: 250 TABLET, DELAYED RELEASE ORAL at 07:46

## 2024-07-28 RX ADMIN — Medication 1 EACH: at 07:51

## 2024-07-28 ASSESSMENT — ACTIVITIES OF DAILY LIVING (ADL)
ADLS_ACUITY_SCORE: 28
DRESS: INDEPENDENT
ADLS_ACUITY_SCORE: 28
ORAL_HYGIENE: INDEPENDENT
LAUNDRY: WITH SUPERVISION
HYGIENE/GROOMING: INDEPENDENT
ADLS_ACUITY_SCORE: 28

## 2024-07-28 NOTE — PLAN OF CARE
"  Problem: Adult Inpatient Plan of Care  Goal: Optimal Comfort and Wellbeing  Outcome: Progressing   Goal Outcome Evaluation:    Plan of Care Reviewed With: patient        Pt paces the hallway, listens to headphones. He is appropriately social with peers. Said his mood is \"fantastic question shari\" then adds he will miss the friends he has made. Denies SI/HA. Reports neck pain rated at '6.5-7'/10 - Prn Tylenol given. Later reports pain relief. Ate 100% of dinner. His dad visited and pt appears to enjoy spending time with him. Prn's Trazodone and Hydroxyzine were given upon request.                   "
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
## 2024-07-28 NOTE — PLAN OF CARE
Goal Outcome Evaluation:         Pt appeared to sleep 4.25 hours during the night, stayed inside of room at times with light on, standing looking out of the window with radio head set on, came out once and returned to room, on Status 15 minute safety checks.

## 2024-07-28 NOTE — PLAN OF CARE
Problem: Manic or Hypomanic Signs/Symptoms  Goal: Improved Mood Symptoms (Manic/Hypomanic Signs/Symptoms)  Outcome: Progressing     Problem: Manic or Hypomanic Signs/Symptoms  Goal: Improved Impulse Control (Manic/Hypomanic Signs/Symptoms)  Outcome: Progressing       Pt is pleasant, engaged in unit activities, polite with requests. Pt will discharge tomorrow and has asked that his father and brother transport him to [facility] facility. Writer has left message in sticky note and will relay info to provider and [facility] tomorrow. Pt presents as optimistic about discharge tomorrow. No new concerns.    Gudelia Palacio RN

## 2024-07-29 VITALS
RESPIRATION RATE: 18 BRPM | BODY MASS INDEX: 21.05 KG/M2 | OXYGEN SATURATION: 97 % | TEMPERATURE: 98.2 F | HEIGHT: 70 IN | SYSTOLIC BLOOD PRESSURE: 132 MMHG | WEIGHT: 147 LBS | HEART RATE: 102 BPM | DIASTOLIC BLOOD PRESSURE: 83 MMHG

## 2024-07-29 PROCEDURE — 250N000013 HC RX MED GY IP 250 OP 250 PS 637: Performed by: REGISTERED NURSE

## 2024-07-29 PROCEDURE — 99239 HOSP IP/OBS DSCHRG MGMT >30: CPT | Performed by: REGISTERED NURSE

## 2024-07-29 RX ADMIN — DIVALPROEX SODIUM 250 MG: 250 TABLET, DELAYED RELEASE ORAL at 08:00

## 2024-07-29 RX ADMIN — Medication 50 MCG: at 08:00

## 2024-07-29 RX ADMIN — ARIPIPRAZOLE 10 MG: 10 TABLET ORAL at 08:00

## 2024-07-29 RX ADMIN — NICOTINE POLACRILEX 4 MG: 2 GUM, CHEWING BUCCAL at 06:33

## 2024-07-29 ASSESSMENT — ACTIVITIES OF DAILY LIVING (ADL)
ADLS_ACUITY_SCORE: 28

## 2024-07-29 NOTE — DISCHARGE SUMMARY
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 Line: is available for free, 24/7 by texting [place] to 699065  Suicide Awareness Voices of Education (SAVE) (www.save.org): 647-812-OAWQ (7530)  The National Suicide Prevention Lifeline is now: 988 Suicide and Crisis Lifeline. Call 988 anytime.  National [place] on Mental Illness (www.[place].rozina.org): 447.703.8457 or 988-817-2746.  Edor[employer]: text the word LIFE to 53539 for immediate support and crisis intervention  Mental Health Consumer/Survivor Network of [place] (www.mhcsn.net): 604.760.3466 or 063-743-3294  Mental Health Association of [place] (www.mentalhealth.org): 512.425.1805 or 468-401-6060  Peer Support Connection [place] Warmline (PSC) 1-767.861.4322 Available from 5pm - 9am (7 days a week/365 days a  year)  [facility] 2-659-273-4977 Community Outreach for Psych Emergencies     General Medication Instructions:   See your medication sheet(s) for instructions.   Take all medicines as directed.  Make no changes unless your doctor suggests them.   Go to all your doctor visits.  Be sure to have all your required lab tests. This way, your medicines can be refilled on time.  Do not use any drugs not prescribed by your doctor.  Avoid alcohol.     Advance Directives:   Scanned document on file with [facility]? No scanned doc  Is document scanned? Pt states no documents  Honoring Choices Your Rights Handout: Informed and given  Was more information offered? Pt declined     The Treatment team has appreciated the opportunity to work with you. If you have any questions or concerns about your recent admission, you can contact the unit which can receive your call 24 hours a day, 7 days a week. They will be able to get in touch with a Provider if needed. The unit number is [phone].      Certain aspects of this note may be copied or templated. Content is updated and changed where needed to reflect the most recent assessment and plan of care. This document is created with the help of Dragon dictation system.  All grammatical/typing errors or context distortion are unintentional and inherent to software.     Attestation:  The patient was seen and evaluated by me. I spent >35 min on the date of the encounter in chart review, patient visit, review of tests, documentation, care coordination, and/or discussion with other providers about the issues documented above.  Millie Whaley, APRN, CNP

## 2024-07-29 NOTE — PLAN OF CARE
Goal Outcome Evaluation:         Pt appeared to sleep 6.75 hours during the night, no problems reported, no PRN's given on Status 15 minute safety checks.

## 2024-07-29 NOTE — PLAN OF CARE
"Goal Outcome Evaluation:    Problem: Adult Inpatient Plan of Care  Goal: Plan of Care Review  Description: The Plan of Care Review/Shift note should be completed every shift.  The Outcome Evaluation is a brief statement about your assessment that the patient is improving, declining, or no change.  This information will be displayed automatically on your shift  note.  Outcome: Adequate for Care Transition  Goal: Patient-Specific Goal (Individualized)  Description: You can add care plan individualizations to a care plan. Examples of Individualization might be:  \"Parent requests to be called daily at 9am for status\", \"I have a hard time hearing out of my right ear\", or \"Do not touch me to wake me up as it startles  me\".  Outcome: Adequate for Care Transition  Goal: Absence of Hospital-Acquired Illness or Injury  Outcome: Adequate for Care Transition  Goal: Optimal Comfort and Wellbeing  Outcome: Adequate for Care Transition  Goal: Readiness for Transition of Care  Outcome: Adequate for Care Transition     Problem: Adult Behavioral Health Plan of Care  Goal: Plan of Care Review  Outcome: Adequate for Care Transition  Goal: Patient-Specific Goal (Individualization)  Description: You can add care plan individualizations to a care plan. Examples of Individualization might be:  \"Parent requests to be called daily at 9am for status\", \"I have a hard time hearing out of my right ear\", or \"Do not touch me to wake me up as it startles  me\".  Outcome: Adequate for Care Transition  Goal: Adheres to Safety Considerations for Self and Others  Outcome: Adequate for Care Transition  Goal: Absence of New-Onset Illness or Injury  Outcome: Adequate for Care Transition  Goal: Optimized Coping Skills in Response to Life Stressors  Outcome: Adequate for Care Transition  Goal: Develops/Participates in Therapeutic [place] to Support Successful Transition  Outcome: Adequate for Care Transition     Problem: Suicide Risk  Goal: Absence of " Self-Harm  Outcome: Adequate for Care Transition     Problem: Disruptive Behavior  Goal: Improved Impulse and Aggression Control (Disruptive Behavior)  Outcome: Adequate for Care Transition  Goal: Improved Mood Symptoms (Disruptive Behavior)  Outcome: Adequate for Care Transition  Goal: Improved Sleep (Disruptive Behavior)  Outcome: Adequate for Care Transition  Goal: Enhanced Social, Occupational or Functional Skills (Disruptive Behavior)  Outcome: Adequate for Care Transition     Problem: Manic or Hypomanic Signs/Symptoms  Goal: Improved Impulse Control (Manic/Hypomanic Signs/Symptoms)  Outcome: Adequate for Care Transition  Goal: Optimized Cognitive Function (Manic/Hypomanic Signs/Symptoms)  Outcome: Adequate for Care Transition  Goal: Improved Mood Symptoms (Manic/Hypomanic Signs/Symptoms)  Outcome: Adequate for Care Transition  Goal: Enhanced Social, Occupational or Functional Skills (Manic/Hypomanic Signs/Symptoms)  Outcome: Adequate for Care Transition     Problem: Sleep Disturbance  Goal: Adequate Sleep/Rest  Outcome: Adequate for Care Transition    The AVS was verbally reviewed with the patient and a copy was given to him to take at time of discharge. All ordered medications were reviewed with the patient including name dose, when and how to take them. Patient verbalized understanding of ordered medications. Provider ordered the patients medications through the hospital discharge pharmacy. They were given to him at time of discharge. All scheduled follow up appointments were reviewed with the patient including cancelling appointments in advance if he will not be able to make the appointments. Patient verbalized understanding of follow up appointments and how to cancel if he needs to. All belongings including those in security were returned to the patient at time of discharge from the unit.  Patient denied SIB/SI and HI prior to discharging from the unit. Patient was instructed to call 911 and/or to go to  the emergency room should he have SIB,HI and/or SI after discharge from the hospital. Patient verbalized understanding of getting help immediately should he have SIB/SI and/or HI after discharge from the hospital. Patient discharged from the hospital at 0906. Patient was walked to the ground floor exit of the [facility] by staff. He was picked up by his Dad and transported to a facility via car.

## 2024-08-14 ENCOUNTER — TELEPHONE (OUTPATIENT)
Dept: BEHAVIORAL HEALTH | Facility: CLINIC | Age: 29
End: 2024-08-14

## 2024-08-14 NOTE — TELEPHONE ENCOUNTER
Writer spoke with patient on que and rescheduled TC psychiatry visit for 08/23/2024 @ 900am.    Carine Gee  08/14/2024  [phone]

## 2024-08-15 ENCOUNTER — TELEPHONE (OUTPATIENT)
Dept: BEHAVIORAL HEALTH | Facility: CLINIC | Age: 29
End: 2024-08-15

## 2024-08-15 ENCOUNTER — OFFICE VISIT (OUTPATIENT)
Dept: BEHAVIORAL HEALTH | Facility: CLINIC | Age: 29
End: 2024-08-15
Payer: COMMERCIAL

## 2024-08-15 DIAGNOSIS — R46.89 AGGRESSIVE BEHAVIOR: ICD-10-CM

## 2024-08-15 DIAGNOSIS — F25.0 SCHIZOAFFECTIVE DISORDER, BIPOLAR TYPE (H): Primary | ICD-10-CM

## 2024-08-15 DIAGNOSIS — F90.2 ADHD (ATTENTION DEFICIT HYPERACTIVITY DISORDER), COMBINED TYPE: ICD-10-CM

## 2024-08-15 PROCEDURE — 99207 PR NO CHARGE LOS: CPT | Performed by: SOCIAL WORKER
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
## 2024-08-15 NOTE — TELEPHONE ENCOUNTER
Writer spoke with [facility] who requested a call back tomorrow between 9-11am to [phone]. Referral post poned for [facility] long term referral. CC needed.    Carine Gee  08/15/2024  415

## 2024-08-15 NOTE — TELEPHONE ENCOUNTER
----- Message from Makenna Pradhan sent at 8/15/2024  3:06 PM CDT -----  Regarding: CC  Transition Clinic Next Level of Care Referral Request    MRN: [id number]  Patient Name:  Natalio Rodas  Phone:  [phone] (home) [phone] (work)  E-mail Address: [email or web address]    Type of patient appointment needed: Individual therapy    Provider Specialties: Specialty Services Requested: Schizoaffective disorder    Service Modality:  Service Modality: In-Person or Virtual    Clinician Gender Preference: male    Clinical Comments: thanks      Makenna Pradhan, MONOSW

## 2024-08-16 ENCOUNTER — TELEPHONE (OUTPATIENT)
Dept: BEHAVIORAL HEALTH | Facility: CLINIC | Age: 29
End: 2024-08-16

## 2024-08-16 NOTE — TELEPHONE ENCOUNTER
Writer called and spoke with patient's group home. Scheduled patient for long-term provider via CC for the following:  Date: Wednesday, 8/21/2024  Time: 11:00 am - 12:00 pm  Provider: Jesús DICKENS  [facility]  Location: [facility], [address] [place] [address], [address], [place] [address]  Phone: [phone]  Type: Teletherapy    Patient instructions  Your appt must be confirmed by CDS staff to hold your appt. All intake e-paperwork must be completed before your appt. Instructions with a link to access your e-paperwork, portal, and any virtual appts will be emailed after you provide all necessary info (e.g., insurance/payment) to CDS staff. Contact CDS at [phone] ext [phone] or [email or web address]     staff took down clinic name and number to follow up on appointment verification.    Elisabeth Anna  Transition Clinic Coordinator  08/16/24 10:13 AM      ----- Message from aMkenna Pradhan sent at 8/15/2024  3:06 PM CDT -----  Regarding:   Transition Clinic Next Level of Care Referral Request    MRN: [id number]  Patient Name:  Natalio Rodas  Phone:  [phone] (home) [phone] (work)  E-mail Address: [email or web address]    Type of patient appointment needed: Individual therapy    Provider Specialties: Specialty Services Requested: Schizoaffective disorder    Service Modality:  Service Modality: In-Person or Virtual    Clinician Gender Preference: male    Clinical Comments: thanks      Makenna Pradhan [facility]

## 2024-08-21 ENCOUNTER — TELEPHONE (OUTPATIENT)
Dept: BEHAVIORAL HEALTH | Facility: CLINIC | Age: 29
End: 2024-08-21

## 2024-08-21 NOTE — TELEPHONE ENCOUNTER
Writer spoke with patient on que and provided TC appointment information as requested.    Carine Gee  08/21/2024  [phone]
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
## 2024-08-23 ENCOUNTER — VIRTUAL VISIT (OUTPATIENT)
Dept: BEHAVIORAL HEALTH | Facility: CLINIC | Age: 29
End: 2024-08-23
Payer: COMMERCIAL

## 2024-08-23 DIAGNOSIS — F17.200 TOBACCO USE DISORDER: ICD-10-CM

## 2024-08-23 DIAGNOSIS — G47.9 SLEEP DISTURBANCES: ICD-10-CM

## 2024-08-23 DIAGNOSIS — F25.0 SCHIZOAFFECTIVE DISORDER, BIPOLAR TYPE (H): Primary | ICD-10-CM

## 2024-08-23 PROCEDURE — 99205 OFFICE O/P NEW HI 60 MIN: CPT | Mod: 95 | Performed by: NURSE PRACTITIONER

## 2024-08-23 RX ORDER — TRAZODONE HYDROCHLORIDE 50 MG/1
50 TABLET, FILM COATED ORAL AT BEDTIME
Qty: 30 TABLET | Refills: 1 | Status: SHIPPED | OUTPATIENT
Start: 2024-08-23 | End: 2024-10-02

## 2024-08-23 RX ORDER — DIVALPROEX SODIUM 250 MG/1
250 TABLET, DELAYED RELEASE ORAL EVERY MORNING
Qty: 30 TABLET | Refills: 1 | Status: SHIPPED | OUTPATIENT
Start: 2024-08-23 | End: 2024-10-02 | Stop reason: DRUGHIGH

## 2024-08-23 RX ORDER — ARIPIPRAZOLE 10 MG/1
10 TABLET ORAL DAILY
Qty: 30 TABLET | Refills: 1 | Status: SHIPPED | OUTPATIENT
Start: 2024-08-23 | End: 2024-10-02

## 2024-08-23 RX ORDER — DIVALPROEX SODIUM 500 MG/1
500 TABLET, DELAYED RELEASE ORAL AT BEDTIME
Qty: 30 TABLET | Refills: 1 | Status: SHIPPED | OUTPATIENT
Start: 2024-08-23 | End: 2024-10-02

## 2024-08-23 RX ORDER — HYDROXYZINE HYDROCHLORIDE 25 MG/1
25 TABLET, FILM COATED ORAL 3 TIMES DAILY PRN
Qty: 90 TABLET | Refills: 1 | Status: SHIPPED | OUTPATIENT
Start: 2024-08-23 | End: 2024-10-02

## 2024-08-23 ASSESSMENT — PATIENT HEALTH QUESTIONNAIRE - PHQ9
SUM OF ALL RESPONSES TO PHQ QUESTIONS 1-9: 4
10. IF YOU CHECKED OFF ANY PROBLEMS, HOW DIFFICULT HAVE THESE PROBLEMS MADE IT FOR YOU TO DO YOUR WORK, TAKE CARE OF THINGS AT HOME, OR GET ALONG WITH OTHER PEOPLE: VERY DIFFICULT
SUM OF ALL RESPONSES TO PHQ QUESTIONS 1-9: 4

## 2024-08-23 ASSESSMENT — PAIN SCALES - GENERAL: PAINLEVEL: NO PAIN (0)

## 2024-08-23 NOTE — NURSING NOTE
Current patient location:  [facility]    Is the patient currently in the state of [place]? YES    Visit mode:VIDEO    If the visit is dropped, the patient can be reconnected by: VIDEO VISIT: Send to e-mail at: [email or web address]    Will anyone else be joining the visit? NO  (If patient encounters technical issues they should call [phone])    How would you like to obtain your AVS? Mail a copy    Are changes needed to the allergy or medication list? No    Are refills needed on medications prescribed by this physician? YES Pt will need refill on all meds.    Rooming Documentation:  Questionnaire(s) completed, Except Adult intake form due to department protocol, & pt not having mychart.      Reason for visit: Consult    Abril MADERA

## 2024-08-23 NOTE — PATIENT INSTRUCTIONS
"Change:  Trazodone/Desyrel 50 mg at bedtime; changed from 50 mg at bedtime as needed    Continue:  Aripiprazole/Abilify 10 mg daily    Divalproex Sodium DR/Depakote 250 mg in the AM + 500 mg at bedtime; TDD = 750 mg    Hydroxyzine/Atarax 25 mg three times daily as needed for sleep anxiety and sleep    Nicotine/Nicorette 2 mg gum    -------------------------------    Patient Education   The Transition Clinic  What to Expect  Here's what to expect in the Transition Clinic.   About the clinic  The Transition Clinic cares for you while you wait for long-term help.   You'll meet with a psychiatric doctor, who can give you medicine to help you feel better. You'll meet with them for about 5 visits or less. You'll see a different psychiatric doctor for your ongoing care. The clinic nurses and coordinators will help you guide your care.  If you have any questions or concerns, we'll be glad to talk with you.  About visits  Be open  At your visits, please talk openly about your problems. It may feel hard, but it's the best way for us to help you.  Cancelling visits  If you can't come to your visit, please call us right away at [phone]. If you don't cancel at least 24 hours (1 full day) before your visit, that's \"late cancellation.\"  Not showing up for your visits  Being very late is the same as not showing up. You will be a \"no show\" if:  You're more than 15 minutes late for a 30-minute (half hour) visit.  You're more than 30 minutes late for a 60-minute (full hour) visit.  If you cancel late or don't show up 2 times within 6 months, we may end your care.   If your ongoing care with a psychiatric doctor is cancelled, we may end your care at the Transition Clinic. If that happens, we'll give you referrals and enough medicine to last 3 months. The Transition Clinic is only used to bridge the gap between your care.  Getting help between visits  If you need help between visits, you can call us Monday to Friday from 8 a.m. to " 4:30 p.m. at [phone].  Emergency care   Call 911 or go to the nearest emergency department if your life or someone else's life is in danger.  Call 988 anytime to reach the national Suicide and Crisis hotline.  Medicine refills  To refill your medicine, call your pharmacy. You can also call the Transition Clinic at [phone], Monday to Friday, 8 a.m. to 4:30 p.m. It can take 1 to 3 business days to get a refill.   Forms, letters, and tests  You may have papers to fill out, like FMLA, short-term disability, and workability. We'll find out if we can do them and let you know. It can take 5 to 7 business days to get them filled out, and we may need you to come in for a visit.  Before we can give you medicine for ADHD, we may refer you to get tested for it or confirm it another way.  We don't do mental health checks ordered by the court.   We don't do mental health testing, but we can refer you to get tested.   Thank you for choosing us for your care.  For informational purposes only. Not to replace the advice of your health care provider. Copyright   2022 [facility]. All rights reserved. [employer] [id number] - 12/22.

## 2024-09-27 ENCOUNTER — TELEPHONE (OUTPATIENT)
Dept: BEHAVIORAL HEALTH | Facility: CLINIC | Age: 29
End: 2024-09-27

## 2024-09-27 NOTE — TELEPHONE ENCOUNTER
Patient's RN at [facility] for [facility] stating patient had difficulty getting connected and is calling on pt behalf to get rescheduled. Writer returned call, and updated cell phone number and verified email for patient to ensure links go to correct location. Patient scheduled for return appointment on 10/3/2024. BILL RN updated.    Elisabeth Anna  Transition Clinic Coordinator  09/27/24 10:36 AM

## 2024-09-27 NOTE — PROGRESS NOTES
[facility]      Mental Health & Addiction Service Line    Transition Clinic: Psychiatry Progress Note  Medication Management              ASSESSMENT/PLAN    Medications:  Change:  Divalproex Sodium DR/Depakote 500 mg at bedtime; decrease from 750 mg    Trazodone/Desyrel 50 mg nightly as needed at bedtime; changed from scheduled/nightly    Continue:  Aripiprazole/Abilify 10 mg daily    Hydroxyzine/Atarax 25 mg three times daily as needed for sleep anxiety and sleep     Nicotine/Nicorette 2 mg gum        Labs:  -None ordered    ------------    -7/12/2024 A1C = 5.8, Prediabetes  -7/19/2024 Lipids = WNL      Therapy and or Non-pharmacological modalities:  -Continue at [facility] until end of Oct/2024      Disposition:  -Has been advised of consultative Transition Clinic model    -Please follow up at the Transition Clinic for medication management in: 6 to 8 weeks            Summary of Clinical Impressions:    Natalio Rodas is a 27 y/o male with a prior hx of: ADHD, ODD, PTSD, Schizoaffective Bipolar Type, and   Polysubstance abuse (alcohol + THC + Opioids).     Medical decision making is complex related to the following: co-morbidities (see problem list + above), and polypharmacy.       Underwent an inpatient psychiatric hospitalization [facility] from 7/12 to 7/29/2024 in the context of: aggressive behaviors, soheila, psychosis, and insomnia related to psychosocial stressors resulting in ingesting 2.5 gm Ibuprofen and 2.5 gm Tylenol   out of anger, however he denied this was a suicide attempt.    Established care at the Transition Clinic on 8/23/2024 appointment for the management of psychotropics/bridging until able   to establish long term outpatient psychiatric services.    --------------    Natalio summarizes overall things are going well at the [facility] facility he is staying at.      He does not demonstrate any overt s/s of soheila, hypomania, or psychosis.   Throughout the interview he is friendly,  conversational, his thought processes are linear/organized, and he is forward thinking/future oriented.    He would like to change Trazodone back to as needed versus continuing as a scheduled medication, citing he does not   want to become dependent on it to fall asleep.   Additionally, he is not sure he needs to be both on the Depakote and the   Abilify for mood stability, therefore he would like to trial decreasing Depakote dosing.    At this time, Natalio does not endorse any suicidal ideations or any immediate safety concerns towards himself or others.      DSM-V and or working diagnosis:    1.  Schizoaffective disorder, bipolar type (H)      2.  Tobacco Use Disorder    3.  Sleep disturbances    4.  Hx as above           SAFETY EVALUATION:  Suicidal ideations:  -denies  Homicidal ideations:  -denies   Access to guns:   -none at this time (staying at a IR)  Risk factors:  -male  -prior overdose  -hx of: AMY + psychosocial stressors  Protective and mitigating factors:  -family  -willing to engage in outpatient mental health services  Risk assessment:  -low to medium      SAFETY PLAN:  -Has numbers of at least 1 family member + 1 friend in personal contact list  -Knows clinic number(s) + operation of regular business hours   -Reviewed to utilize 988 or text [place] to [phone] for mental health crisis  -Discussed to call 911 and or return to the nearest Emergency Department if unable to maintain safety of self or others (due to severity of suicidal and or homicidal ideations)      PSYCHIATRIC HISTORY    Individual therapy or IOP:   -Attending at [facility]     Suicide attempts:  -Overdose of Tylenol + Ibuprofen on 7/12/2024 (denied this was an attempt)     Inpatient psychiatric hospitalizations:  -Multiple; > 5x  -Most recent: [facility], from 7/12 to 7/29/2024     Commitments:  -MICD w/Banuelos in 2016  -Stay of commitment in 9/2017  -MI in 9/2019  -MI in 5/2021        ECT:  -None reported            Medication Trials:      SSRIs:  -Celexa     Mood stabilizers:  -Lithium: jittery     Antipsychotics:  -Haldol  -Risperdal  -Zyprexa     Stimulants/ADHD meds:  -Concerta         SUBSTANCE USE     Prior use:     Per chart review:  -CUD  -AUD  -Possible substance induced psychosis     Per patient report:  -OUD       MEDICAL HISTORY  -The problem list was reviewed via the Electronic Medical Record (EMR) prior to the appointment  -The patient denies any concerning physical and or medical symptoms during the interviewing process        MEDICATIONS      Current Outpatient Medications:     ARIPiprazole (ABILIFY) 10 MG tablet, Take 1 tablet (10 mg) by mouth daily., Disp: 30 tablet, Rfl: 1    divalproex sodium delayed-release (DEPAKOTE) 250 MG DR tablet, Take 1 tablet (250 mg) by mouth every morning., Disp: 30 tablet, Rfl: 1    divalproex sodium delayed-release (DEPAKOTE) 500 MG DR tablet, Take 1 tablet (500 mg) by mouth at bedtime., Disp: 30 tablet, Rfl: 1    facial soap (NEUTROGENA) BAR bar, Externally apply 1 each topically daily, Disp: 1 each, Rfl: 0    hydrOXYzine HCl (ATARAX) 25 MG tablet, Take 1 tablet (25 mg) by mouth 3 times daily as needed for anxiety or other (sleep)., Disp: 90 tablet, Rfl: 1    nicotine (NICORETTE) 2 MG gum, Place 1 each (2 mg) inside cheek every hour as needed for nicotine withdrawal symptoms., Disp: 120 each, Rfl: 1    traZODone (DESYREL) 50 MG tablet, Take 1 tablet (50 mg) by mouth at bedtime., Disp: 30 tablet, Rfl: 1    Vitamin D3 (CHOLECALCIFEROL) 25 mcg (1000 units) tablet, Take 2 tablets (50 mcg) by mouth daily, Disp: 60 tablet, Rfl: 0      If a controlled substance is prescribed during today's appointment:    -The [place] Prescription Monitoring Program has been reviewed and there are no current concerns with: diversionary activity, early refill requests, and or obtaining the medication from multiple providers.        VITALS    BP Readings from Last 3 Encounters:   07/29/24 132/83   12/29/23 (!) 145/87    11/10/23 103/66       Pulse Readings from Last 3 Encounters:   07/29/24 102   12/29/23 89   11/10/23 71       Wt Readings from Last 3 Encounters:   07/28/24 66.7 kg (147 lb)   12/29/23 68 kg (150 lb)   11/21/23 68 kg (150 lb)           SCALES        10/3/2019     9:15 AM 11/21/2023     8:00 AM 8/23/2024     8:40 AM   PHQ   PHQ-9 Total Score 1 1 4   Q9: Thoughts of better off dead/self-harm past 2 weeks Not at all Not at all Not at all            10/3/2019     9:15 AM 11/21/2023     8:00 AM   NABEEL-7 SCORE   Total Score 4 1       SUBSTANCE USE      Prior use:     Per chart review:  -CUD  -AUD  -Possible substance induced psychosis     Per patient report:  -OUD           Current use:     Alcohol:   -None reported      Recreational Drugs:   -None reported      Per chart review  -THC daily since 14 y/o     Medical Marijuana:  -None reported      Cigarettes per day:   -1/4 pack     Chewing tobacco:   -None reported      Vaping:    -Nicotine      Caffeine intake per day:    -0 to 2 coffee           MENTAL STATUS EXAMINATION    Appearance: Adequately Groomed, Attire Appropriate for the Season  General Behavior:  Cooperative, Direct Eye Contact  Speech: Fluent, Normal rate and volume  Musculoskeletal:    -Gait not observed during t.h. visit  -No facial tics/tremors observed   -Motor coordination is grossly intact   Mood: It's pretty good  Affect: Flat, Blunted  Attention: Intact  Orientation:  Person, Place, Time, Situation  Thought Associations:  Intact  Thought Content: Reality based   Thought Processes: Organized, Normal rate  Memory: No overt impairment; no screenings or formal testing performed  Language: Intact  Judgement: Fair to good  Insight: Fair to good        INTERIM HX:    -It seems like things have stabilized     -Still at [facility] until the end of Oct/2024  -Will be moving in with my brother and aunt after leaving the [facility]    -Brother was in a 3 car collision  -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  "what his next car will be + his insurance situation    -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           PSYCHIATRIC ROS    Sleep:  -Getting 8 to 10 hours per night   -Usually feel well rested but do need coffee in the AM because I'm a little groggy when waking up  -Want to change the Trazodone back to prn because \"don't want to depend on taking it nightly      Trauma and or PTSD:  See the following encounters for further details  -2024 encounter by QUINCY Diaz LP for further details  -8/15/2024 encounter by BLANCA Pradhan    ------------------    -Did not discuss in detail during today's appointment        Mood/Anxiety:  -See PHQ-9 score    -See NABEEL-7 score        Psychosis/Payton:  -See  to 2024 [facility] for further details     ---------------     Currently denies:  -VH  -Delusions  -Elevated self esteem, grandiose thoughts, increased spending or gambling      Suicidal ideations:  -Denies passive or active thoughts at this time      SIB:  -Denies engaging in self harm         Side effects:  No change:  -Increased appetite   -Have gained weight since starting Depakote + Abilify  -However weight has plateau'd at 168 lbs          VISIT INFORMATION    Date:  2024     Number:  -2nd    Patient Identifying Information:  Legal name: Natalio Rodas  Preferred name: Natalio  : 1995    Participants:   -Patient  -Provider      Telehealth visit details:  Type of service:  Video Visit   Video Start Time: 3:45 PM  Video End Time:  4:16 PM    Originating Location (pt. Location):   -[facility]  -Address: [address] [place] [address]  -Phone [phone]    Distant Location (provider location):  Off-site  Platform used for Video Visit: Federico    Total time:  38 minutes per:    -Review of EMR including but not limited to: tabs within Chart Review + outside records if applicable   -Appointment time  -Documentation        Britney MCDONALD-CNP, " PMH-BC        ----------------------------------------------------------------------------------------------------------------------        TREATMENT RISK STATEMENT    The risks, benefits, alternatives, and potential adverse effects have been explained and are understood by the patient.  The patient agrees to the treatment plan with their ability to do so.      The patient knows to call the clinic: [phone]  for any problems or concerns until the next psychiatry visit, regardless if it is within or outside of the [employer] system.     If unable to reach clinic staff (via phone call or medical messaging) during the normal business hours: 8:00 am to 4:30 pm then it is recommended accessing the nearest: emergency department, urgent care facility, or utilizing local (varies based on county of residence) and national crisis #'s or text messaging services for immediate assistance.          ----------------------------------------------------------------------------------------------------------------------      If applicable the following has been discussed with the patient, parent/guardian, and or attending family member during the appointment:      Risks of polypharmacy and possible drug interactions with current medication list + common OTC products, herbs, and supplements.  Moving forward, it is suggested to intermittently check-in with a clinic or retail pharmacist whenever new medications or OTC/h/s are consumed.    Risks of polypharmacy and possible drug + drug interactions with current medication list.  Moving forward, it is suggested to intermittently check-in with a clinic or retail pharmacist whenever new prescription medications are added to your treatment regimen.    Recommendation to adhere to CDC guidelines as it relates to alcohol consumption.  If taking benzodiazepines, you should abstain from alcohol intake due to increased risks of CNS and respiratory depression, as well as psychomotor  impairment.    If possible, it is recommended to avoid concurrent use of prescribed: opioids + benzodiazepines due to increased risks of CNS and respiratory depression, as well as the increased risk of overdose.     Recommendation to minimize and or abstain from THC use (unless you are prescribed medical marijuana).    Recommendation to abstain from illicit substances including but not limited to the following: heroin, street fentanyl, cocaine, methamphetamines, bath salts, and other synthetic products.    Recommendation to abstain from tobacco/smoking/nicotine, alcohol, THC, and all illicit substances if trying to become or are pregnant.    Do not take opioids, stimulants, and or other prescription medications unless they are specifically prescribed for you.     Black Box Warnings associated with the prescribed psychotropic(s).     Potential adverse effects of antipsychotics including but not limited to the following: weight gain, metabolic syndrome, EPS/Tardive Dyskinesias, and Neuroleptic Malignant Syndrome.    Potential adverse effects of stimulants including but not limited to the following: sudden death, MI, stroke, HTN, cardiomyopathy (long term use), seizures, soheila, psychosis, and aggressive behaviors.

## 2024-10-01 ENCOUNTER — TELEPHONE (OUTPATIENT)
Dept: BEHAVIORAL HEALTH | Facility: CLINIC | Age: 29
End: 2024-10-01

## 2024-10-01 NOTE — TELEPHONE ENCOUNTER
Patient and staff called TC about appointment stating that they cannot get into mychart. Reviewed email address and updated in appt notes.     Chikis Aviles  Transition Clinic Coordinator  10/01/24 1:16 PM

## 2024-10-02 ENCOUNTER — VIRTUAL VISIT (OUTPATIENT)
Dept: BEHAVIORAL HEALTH | Facility: CLINIC | Age: 29
End: 2024-10-02
Payer: COMMERCIAL

## 2024-10-02 DIAGNOSIS — G47.9 SLEEP DISTURBANCES: ICD-10-CM

## 2024-10-02 DIAGNOSIS — F17.200 TOBACCO USE DISORDER: ICD-10-CM

## 2024-10-02 DIAGNOSIS — F25.0 SCHIZOAFFECTIVE DISORDER, BIPOLAR TYPE (H): Primary | ICD-10-CM

## 2024-10-02 PROBLEM — Z86.59 HISTORY OF SCHIZOAFFECTIVE DISORDER: Status: RESOLVED | Noted: 2024-07-17 | Resolved: 2024-10-02

## 2024-10-02 PROCEDURE — 99214 OFFICE O/P EST MOD 30 MIN: CPT | Mod: 95 | Performed by: NURSE PRACTITIONER

## 2024-10-02 RX ORDER — HYDROXYZINE HYDROCHLORIDE 25 MG/1
25 TABLET, FILM COATED ORAL 3 TIMES DAILY PRN
Qty: 90 TABLET | Refills: 1 | Status: SHIPPED | OUTPATIENT
Start: 2024-11-01

## 2024-10-02 RX ORDER — TRAZODONE HYDROCHLORIDE 50 MG/1
50 TABLET, FILM COATED ORAL
Qty: 30 TABLET | Refills: 1 | Status: SHIPPED | OUTPATIENT
Start: 2024-11-01

## 2024-10-02 RX ORDER — ARIPIPRAZOLE 10 MG/1
10 TABLET ORAL DAILY
Qty: 30 TABLET | Refills: 1 | Status: SHIPPED | OUTPATIENT
Start: 2024-11-01

## 2024-10-02 RX ORDER — TRAZODONE HYDROCHLORIDE 50 MG/1
50 TABLET, FILM COATED ORAL
Qty: 30 TABLET | Refills: 0 | Status: SHIPPED | OUTPATIENT
Start: 2024-10-02

## 2024-10-02 RX ORDER — ARIPIPRAZOLE 10 MG/1
10 TABLET ORAL DAILY
Qty: 30 TABLET | Refills: 0 | Status: SHIPPED | OUTPATIENT
Start: 2024-10-02

## 2024-10-02 RX ORDER — HYDROXYZINE HYDROCHLORIDE 25 MG/1
25 TABLET, FILM COATED ORAL 3 TIMES DAILY PRN
Qty: 90 TABLET | Refills: 0 | Status: SHIPPED | OUTPATIENT
Start: 2024-10-02

## 2024-10-02 RX ORDER — DIVALPROEX SODIUM 500 MG/1
500 TABLET, DELAYED RELEASE ORAL AT BEDTIME
Qty: 30 TABLET | Refills: 1 | Status: SHIPPED | OUTPATIENT
Start: 2024-11-01

## 2024-10-02 RX ORDER — DIVALPROEX SODIUM 500 MG/1
500 TABLET, DELAYED RELEASE ORAL AT BEDTIME
Qty: 30 TABLET | Refills: 0 | Status: SHIPPED | OUTPATIENT
Start: 2024-10-02

## 2024-10-02 ASSESSMENT — PAIN SCALES - GENERAL: PAINLEVEL: NO PAIN (0)

## 2024-10-02 NOTE — PATIENT INSTRUCTIONS
Change:  Divalproex Sodium DR/Depakote 500 mg at bedtime; decrease from 750 mg    Trazodone/Desyrel 50 mg nightly as needed at bedtime; changed from scheduled/nightly    Continue:  Aripiprazole/Abilify 10 mg daily    Hydroxyzine/Atarax 25 mg three times daily as needed for sleep anxiety and sleep     Nicotine/Nicorette 2 mg gum    --------------------    -If there are questions/inquiries between now and the next appointment OR prior to transferring to the next level of care, please call (see below).    Clinic hours/phone number at the Transition Clinic:   -Monday to Friday from 8:00 am to 4:30 pm  -[phone]    -----------------------    Call:  -[phone] ([phone]) if guidance is needed after T.C. clinic hours about utilizing [facility] versus the Emergency Departments    ----------------------    Any time (during or after regular clinic hours) immediate and or life threatening symptoms occur (either medical or mental health), call:  [phone] and or go to the nearest Emergency Department      ---------------------    Please use the following websites to obtain a comprehensive list of all counties within the state [place] for adult and child mental health crisis numbers:    https://[place].gov/dhs/people-we-serve/people-with-disabilities/health-care/adult-mental-health/resources/crisis-contacts.jsp    https://[place]/dhs/people-we-serve/people-with-disabilities/health-care/childrens-mental-health/resources/crisis-contacts.jsp      -------------------      Below is a list of county (also found on the above websites), state, and national crisis numbers.   These resources can provide real-time assistance if experiencing moderate to severe mental health symptoms.        Additionally, please visit your county website to find the most up-to-date list of local:  adult, child, family, and chemical dependency services available.        [facility]  [phone]    [facility]  [phone]    [facility]  [facility]  [phone]    [facility]  [phone]    [facility]  [phone]: Adults 18 and over    [phone]: Children 17 and under    [facility]), Acute Psychiatric Services (APS) Assessment & Referral:   [phone]    Community Outreach for Psychiatric Emergencies (COPE):  [phone]    [facility]  [phone]: Adults 18 and over    [phone]: Children 17 and under      Walk-in crisis services are available Monday to Friday from 8 am to 5:30 pm at Urgent Care for  Adult metal health:    [address] [place] [address]  Closed on Saturday, Sunday, and holidays    [place]  [phone]: Toll free    [facility]  [phone]      [facility] [place]  [phone]: Toll free    Text: [place] to 523463      [facility] and human services  Call 211 or visit [email or web address] to obtain free and confidential h/hs information     [place] [facility]  If you are an adult needing support, you can talk to a specialist who has first hand experience living with a mental health condition:    [phone]    Text: Support to 49311    [facility] [place]:  domestic violence/LGBTQ+  849.317.9682 option 3    [employer]: LGBTQ+  2-652-302-2121    Text: START to 987823     Resources  0-334-FwwaWiu: (1-429.680.9985)    Crisis line: 1-986.853.6323    Text: 330504    [employer]: women and sexually exploited youth  [phone]    [employer] Domestic Violence Shelter Hotline  468.682.1356    National Suicide Prevention Lifeline  983    National Youth Crisis Hotline  962

## 2024-10-02 NOTE — NURSING NOTE
Is the patient currently in the state of [place]? YES    Current patient location:  [address] [place] [address]    Visit mode:VIDEO    If the visit is dropped, the patient can be reconnected by: VIDEO VISIT:  Send e-mail to at [email or web address]    Will anyone else be joining the visit? No  (If patient encounters technical issues they should call [phone])    Are changes needed to the allergy or medication list? No    Are refills needed on medications prescribed by this physician? Yes    Rooming Documentation: Unable to complete qnrs due to time.    Reason for visit: RECHDURGA Torrez F      Care team has reviewed attendance agreement with patient. Patient advised that two failed appointments within 6 months may lead to termination of current episode of care.

## 2024-10-04 ENCOUNTER — TELEPHONE (OUTPATIENT)
Dept: BEHAVIORAL HEALTH | Facility: CLINIC | Age: 29
End: 2024-10-04

## 2024-10-04 NOTE — TELEPHONE ENCOUNTER
Reason for call:  Other   Patient called regarding (reason for call): call back  Additional comments:  medication refill questions    Phone number to reach patient:  Other phone number:  [phone]  [facility]    Best Time:  asap    Can we leave a detailed message on this number?  YES    Travel screening: Negative

## 2024-11-25 ENCOUNTER — TELEPHONE (OUTPATIENT)
Dept: BEHAVIORAL HEALTH | Facility: CLINIC | Age: 29
End: 2024-11-25
Payer: COMMERCIAL

## 2024-11-25 NOTE — TELEPHONE ENCOUNTER
Appointment reminder, patient confirmed.     Chikis Aviles  Transition Clinic Coordinator  11/25/24 5:03 PM

## 2024-12-02 ENCOUNTER — VIRTUAL VISIT (OUTPATIENT)
Dept: PSYCHOLOGY | Facility: CLINIC | Age: 29
End: 2024-12-02
Payer: COMMERCIAL

## 2024-12-02 DIAGNOSIS — Z53.9 NO SHOW: Primary | ICD-10-CM

## 2024-12-02 PROCEDURE — 99207 PR NO CHARGE LOS: CPT | Mod: 95 | Performed by: MARRIAGE & FAMILY THERAPIST

## 2024-12-16 ENCOUNTER — TELEPHONE (OUTPATIENT)
Dept: BEHAVIORAL HEALTH | Facility: CLINIC | Age: 29
End: 2024-12-16
Payer: COMMERCIAL

## 2024-12-16 NOTE — PROGRESS NOTES
[facility]      Mental Health & Addiction Service Line    Transition Clinic: Psychiatry Progress Note  Medication Management              ASSESSMENT/PLAN    Medications:  Change:  Divalproex Sodium DR/Depakote 250 mg at bedtime: discontinue    Continue:  Aripiprazole/Abilify 10 mg daily      Hydroxyzine/Atarax 25 mg three times daily as needed for sleep anxiety and sleep     Nicotine/Nicorette 2 mg gum     Trazodone/Desyrel 50 mg nightly as needed at bedtime      Labs:  -None ordered      Therapy and or Non-pharmacological modalities:      Disposition:  -Has been advised of consultative Transition Clinic model    -You do not need to return to the Transition Clinic for medication management    -Please make sure to keep the appointment for longitudinal outpatient psychiatry services  (see below):    Department: [facility]  Provider: DIMA SOW  Location: [address] [place] [address] [address] [address]   Phone: [phone]  Fax: [phone]  Date/Time/Visit type: 1/17/2025, arrive by 9:30 am, via in person/onsite             Summary of Clinical Impressions:    Natalio Rodas is a 28 y/o male with a prior hx of: ADHD, ODD, PTSD, Schizoaffective Bipolar Type,   and Polysubstance abuse (alcohol + THC + Opioids).     Medical decision making is complex related to the following: co-morbidities (see problem list + above), and polypharmacy.       Underwent an inpatient psychiatric hospitalization [facility] from 7/12 to 7/29/2024 in the context of: aggressive   behaviors, soheila, psychosis, and insomnia related to psychosocial stressors resulting in ingesting 2.5 gm   Ibuprofen and 2.5 gm Tylenol out of anger, however he denied this was a suicide attempt.     Established care at the Transition Clinic on 8/23/2024 appointment for the management of psychotropics/bridging   until able to establish long term outpatient psychiatric services.     Attended follow up in 2024 on: 10/2, 11/26.      ------------------------------    Natalio outlines he is stable and he did not notice any emerging soheila or hypomania in the interim (with further reduction of Depakote), nor does he demonstrate this during the interview.   To minimize polypharmacy he would   like to discontinue the Depakote altogether.    Natalio's presentation is conversational and he summarizes things are still going well at a new job.  At this time   he denies any immediate safety concerns towards himself or others and is forward thinking/future oriented.        DSM-V and or working diagnosis:    1.  Schizoaffective disorder, bipolar type (H)      2.  Tobacco Use Disorder     3.  Hx as above           SAFETY EVALUATION:  Suicidal ideations:  -denies  Homicidal ideations:  -denies   Access to guns:   -none reported  Risk factors:  -male  -prior overdose  -hx of: AMY + psychosocial stressors  Protective and mitigating factors:  -family  -willing to engage in outpatient mental health services  Risk assessment:  -low to medium         SAFETY PLAN:  -Has numbers of at least 1 family member + 1 friend in personal contact list  -Knows clinic number(s) + operation of regular business hours   -Reviewed to utilize 988 or text [place] to [phone] for mental health crisis  -Discussed to call 911 and or return to the nearest Emergency Department if unable to maintain safety of self or others (due to severity of suicidal and or homicidal ideations)      PSYCHIATRIC HISTORY    Individual therapy or IOP:   -Attending at T.C.     Suicide attempts:  -Overdose of Tylenol + Ibuprofen on 7/12/2024 (denied this was an attempt)     Inpatient psychiatric hospitalizations:  -Multiple; > 5x  -Most recent: [facility], from 7/12 to 7/29/2024     Commitments:  -MICD w/Banuelos in 2016  -Stay of commitment in 9/2017  -MI in 9/2019  -MI in 5/2021        ECT:  -None reported            Medication Trials:     SSRIs:  -Celexa     Mood stabilizers:  -Depakote 750 mg: requested to  eventually discontinue   -Lithium: jittery     Antipsychotics:  -Haldol  -Risperdal  -Zyprexa     Stimulants/ADHD meds:  -Concerta        MEDICAL HISTORY  -The problem list was reviewed via the Electronic Medical Record (EMR) prior to the appointment  -The patient denies any concerning physical and or medical symptoms during the interviewing process        MEDICATIONS      Current Outpatient Medications:     ARIPiprazole (ABILIFY) 10 MG tablet, Take 1 tablet (10 mg) by mouth daily., Disp: 30 tablet, Rfl: 1    divalproex sodium delayed-release (DEPAKOTE) 250 MG DR tablet, Take 1 tablet (250 mg) by mouth at bedtime., Disp: 30 tablet, Rfl: 0    facial soap (NEUTROGENA) BAR bar, Externally apply 1 each topically daily, Disp: 1 each, Rfl: 0    hydrOXYzine HCl (ATARAX) 25 MG tablet, Take 1 tablet (25 mg) by mouth 3 times daily as needed for anxiety or other (sleep)., Disp: 90 tablet, Rfl: 0    hydrOXYzine HCl (ATARAX) 25 MG tablet, Take 1 tablet (25 mg) by mouth 3 times daily as needed for anxiety (or sleep)., Disp: 90 tablet, Rfl: 1    nicotine (NICORETTE) 2 MG gum, Place 1 each (2 mg) inside cheek every hour as needed for nicotine withdrawal symptoms., Disp: 120 each, Rfl: 1    traZODone (DESYREL) 50 MG tablet, Take 1 tablet (50 mg) by mouth nightly as needed for sleep., Disp: 30 tablet, Rfl: 1    Vitamin D3 (CHOLECALCIFEROL) 25 mcg (1000 units) tablet, Take 2 tablets (50 mcg) by mouth daily, Disp: 60 tablet, Rfl: 0      If a controlled substance is prescribed during today's appointment:    -The [place] Prescription Monitoring Program has been reviewed and there are no current concerns with: diversionary activity, early refill requests, and or obtaining the medication from multiple providers.        VITALS    BP Readings from Last 3 Encounters:   07/29/24 132/83   12/29/23 (!) 145/87   11/10/23 103/66       Pulse Readings from Last 3 Encounters:   07/29/24 102   12/29/23 89   11/10/23 71       Wt Readings from Last 3  Encounters:   07/28/24 66.7 kg (147 lb)   12/29/23 68 kg (150 lb)   11/21/23 68 kg (150 lb)           SCALES        11/21/2023     8:00 AM 8/23/2024     8:40 AM 11/26/2024     3:24 PM   PHQ   PHQ-9 Total Score 1 4 2    Q9: Thoughts of better off dead/self-harm past 2 weeks Not at all Not at all  Not at all        Patient-reported            10/3/2019     9:15 AM 11/21/2023     8:00 AM   NABEEL-7 SCORE   Total Score 4 1           CURRENT SUBSTANCE USE    Alcohol:   -None reported      Recreational Drugs:   -None reported      Per chart review  -THC daily since 12 y/o     Medical Marijuana:  -None reported      Cigarettes per week:   -1 pack     Chewing tobacco:   -None reported      Vaping:    -Nicotine      Caffeine intake per day:    -1 soda        MENTAL STATUS EXAMINATION    Appearance: Adequately Groomed, Attire Appropriate for the Season  General Behavior:  Cooperative, Direct Eye Contact, Tired/yawning  Speech: Fluent, Normal rate and volume  Musculoskeletal:    -Gait not observed during t.h. 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    -Sometimes snowboard in the winter months  -Prefer natural snow versus when it's generated          PSYCHIATRIC ROS    Sleep:  -Getting 6 to 8 hours  depending on work schedule      Trauma and or PTSD:  See the following encounters for further details  -7/12/2024 encounter by QUINCY Diaz LP for further  details  -8/15/2024 encounter by BLANCA Pradhan      Mood/Anxiety:  -See PHQ-9 score    -See NABEEL-7 score      Psychosis/[place]:  -See  to 2024 [facility] for further details     ---------------     Currently denies:  -VH  -Delusions  -Elevated self esteem, grandiose thoughts, increased spending or gambling       Suicidal ideations:  -Denies passive or active thoughts at this time      SIB:  -Denies engaging in self harm       Side effects:  -None reported           VISIT INFORMATION    Date:  2024       Number:  -4th    Patient Identifying Information:  Legal name: Natalio Rodas  Preferred name: Natalio  : 1995    Participants:   -Patient  -Provider      Telehealth visit details:    Type of service:  Video Visit     Video Start Time: 9:03 AM  Video End Time:  9:19 AM    Originating Location (pt. Location): Home    Distant Location (provider location):  On-site  Platform used for Video Visit: [facility]    Total time:   22 minutes per:    -Review of EMR including but not limited to: tabs within Chart Review + outside records if applicable   -Appointment time  -Documentation        Britney MCDONALD-CNP, [facility]-BC        ----------------------------------------------------------------------------------------------------------------------        TREATMENT RISK STATEMENT    The risks, benefits, alternatives, and potential adverse effects have been explained and are understood by the patient.  The patient agrees to the treatment plan with their ability to do so.      The patient knows to call the clinic: [phone]  for any problems or concerns until the next psychiatry visit, regardless if it is within or outside of the [employer] system.     If unable to reach clinic staff (via phone call or medical messaging) during the normal business hours: 8:00 am to 4:30 pm then it is recommended accessing the nearest: emergency department, urgent care facility, or utilizing local (varies based on  On license of [facility]) and national crisis #'s or text messaging services for immediate assistance.          ----------------------------------------------------------------------------------------------------------------------      If applicable the following has been discussed with the patient, parent/guardian, and or attending family member during the appointment:      Risks of polypharmacy and possible drug interactions with current medication list + common OTC products, herbs, and supplements.  Moving forward, it is suggested to intermittently check-in with a clinic or retail pharmacist whenever new medications or OTC/h/s are consumed.    Risks of polypharmacy and possible drug + drug interactions with current medication list.  Moving forward, it is suggested to intermittently check-in with a clinic or retail pharmacist whenever new prescription medications are added to your treatment regimen.    Recommendation to adhere to CDC guidelines as it relates to alcohol consumption.  If taking benzodiazepines, you should abstain from alcohol intake due to increased risks of CNS and respiratory depression, as well as psychomotor impairment.    If possible, it is recommended to avoid concurrent use of prescribed: opioids + benzodiazepines due to increased risks of CNS and respiratory depression, as well as the increased risk of overdose.     Recommendation to minimize and or abstain from THC use (unless you are prescribed medical marijuana).    Recommendation to abstain from illicit substances including but not limited to the following: heroin, street fentanyl, cocaine, methamphetamines, bath salts, and other synthetic products.    Recommendation to abstain from tobacco/smoking/nicotine, alcohol, THC, and all illicit substances if trying to become or are pregnant.    Do not take opioids, stimulants, and or other prescription medications unless they are specifically prescribed for you.     Black Box Warnings associated  with the prescribed psychotropic(s).     Potential adverse effects of antipsychotics including but not limited to the following: weight gain, metabolic syndrome, EPS/Tardive Dyskinesias, and Neuroleptic Malignant Syndrome.    Potential adverse effects of stimulants including but not limited to the following: sudden death, MI, stroke, HTN, cardiomyopathy (long term use), seizures, soheila, psychosis, and aggressive behaviors.

## 2024-12-16 NOTE — TELEPHONE ENCOUNTER
Appointment reminder, patient confirmed.     Chikis Aviles  Transition Clinic Coordinator  12/16/24 1:16 PM

## 2024-12-17 ENCOUNTER — VIRTUAL VISIT (OUTPATIENT)
Dept: BEHAVIORAL HEALTH | Facility: CLINIC | Age: 29
End: 2024-12-17
Payer: COMMERCIAL

## 2024-12-17 DIAGNOSIS — F25.0 SCHIZOAFFECTIVE DISORDER, BIPOLAR TYPE (H): ICD-10-CM

## 2024-12-17 DIAGNOSIS — F17.200 TOBACCO USE DISORDER: ICD-10-CM

## 2024-12-17 PROCEDURE — 99214 OFFICE O/P EST MOD 30 MIN: CPT | Mod: 95 | Performed by: NURSE PRACTITIONER

## 2024-12-17 RX ORDER — ARIPIPRAZOLE 10 MG/1
10 TABLET ORAL DAILY
Qty: 90 TABLET | Refills: 0 | Status: SHIPPED | OUTPATIENT
Start: 2024-12-17

## 2024-12-17 ASSESSMENT — PAIN SCALES - GENERAL: PAINLEVEL_OUTOF10: NO PAIN (0)

## 2024-12-17 NOTE — NURSING NOTE
Is the patient currently in the state of [place]? YES    Current patient location: [address] [place] [address]    Visit mode:VIDEO    If the visit is dropped, the patient can be reconnected by: VIDEO VISIT: Text to cell phone:   Telephone Information:   Mobile [phone]       Will anyone else be joining the visit? No  (If patient encounters technical issues they should call [phone])    Are changes needed to the allergy or medication list? No    Are refills needed on medications prescribed by this physician? Yes    Rooming Documentation: Unable to complete qnrs due to time.    Reason for visit: RECHECK     SANTY Duong

## 2024-12-17 NOTE — PATIENT INSTRUCTIONS
Change:  Divalproex Sodium DR/Depakote 250 mg at bedtime: discontinue    Continue:  Aripiprazole/Abilify 10 mg daily      Hydroxyzine/Atarax 25 mg three times daily as needed for sleep anxiety and sleep     Nicotine/Nicorette 2 mg gum     Trazodone/Desyrel 50 mg nightly as needed at bedtime    ----------------------    -You do not need to return to the Transition Clinic for medication management    -Please make sure to keep the appointment for longitudinal outpatient psychiatry services  (see below):    Department: [facility]  Provider: DIMA SOW  Location: [address] [place] [address], [address], [place] [address]   Phone: [phone]  Fax: [phone]  Date/Time/Visit type: 1/17/2025, arrive by 9:30 am, via in person/onsite     -----------------------      -If there are questions/inquiries between now and the next appointment OR prior to transferring to the next level of care, please call (see below).    Clinic hours/phone number at the Transition Clinic:   -Monday to Friday from 8:00 am to 4:30 pm  -[phone]    -----------------------    Call:  -[phone] ([phone]) if guidance is needed after T.C. clinic hours about utilizing [facility] versus the Emergency Departments    ----------------------    Any time (during or after regular clinic hours) immediate and or life threatening symptoms occur (either medical or mental health), call:  [phone] and or go to the nearest Emergency Department      ---------------------    Please use the following websites to obtain a comprehensive list of all counties within the [facility] for adult and child mental health crisis numbers:    https://[place].gov/dhs/people-we-serve/people-with-disabilities/health-care/adult-mental-health/resources/crisis-contacts.jsp    https://[place].gov/dhs/people-we-serve/people-with-disabilities/health-care/childrens-mental-health/resources/crisis-contacts.jsp      -------------------      Below is a list of county (also found on the  above websites), state, and national crisis numbers.   These resources can provide real-time assistance if experiencing moderate to severe mental health symptoms.        Additionally, please visit your county website to find the most up-to-date list of local:  adult, child, family, and chemical dependency services available.        [facility]  [phone]    [facility]  [phone]    [facility]  [phone]    [facility]  [phone]    [facility]  [phone]: Adults 18 and over    [phone]: Children 17 and under    [facility] ([facility]), Acute Psychiatric Services (APS) Assessment & Referral:   [phone]    Community Outreach for Psychiatric Emergencies (COPE):  [phone]    [facility]  [phone]: Adults 18 and over    [phone]: Children 17 and under      Walk-in crisis services are available Monday to Friday from 8 am to 5:30 pm at Urgent Care for  Adult metal health:    [address] [place] [address]  Closed on Saturday, Sunday, and holidays    [place]  [phone]: Toll free    [facility]  [phone]      [facility] [place]  [phone]: Toll free    Text: [place] to 372381      [facility] and human services  Call 211 or visit https://www.211unitedway.org to obtain free and confidential h/hs information     [place] [facility]  If you are an adult needing support, you can talk to a specialist who has first hand experience living with a mental health condition:    [phone]    Text: Support to 23015    [facility] [employer] [place]:  domestic violence/LGBTQ+  563.619.9762 option 3    [employer]: LGBTQ+  1-494.555.9172    Text: START to 177978     Resources  7-828-QinrMwr: (1-712.998.1726)    Crisis line: 1-650.291.8906    Text: 842700    [employer]: women and sexually exploited youth  [phone]    [facility]  Hotline  [phone]    National Suicide Prevention Lifeline  98    National Youth Crisis Hotline  574

## (undated) DEVICE — IMPLANTABLE DEVICE
Type: IMPLANTABLE DEVICE | Site: ELBOW | Status: NON-FUNCTIONAL
Removed: 2019-11-05

## (undated) DEVICE — SOL NACL 0.9% IRRIG 1000ML BOTTLE 2F7124

## (undated) DEVICE — GLOVE PROTEXIS POWDER FREE SMT 6.5  2D72PT65X

## (undated) DEVICE — LINEN ORTHO PACK 5446

## (undated) DEVICE — CAST PADDING 4" STERILE 9044S

## (undated) DEVICE — 2.8MM QUICK RELEASE DRILL

## (undated) DEVICE — GLOVE PROTEXIS BLUE W/NEU-THERA 6.5  2D73EB65

## (undated) DEVICE — COVER CAMERA IN-LIGHT DISP LT-C02

## (undated) DEVICE — SUCTION MANIFOLD NEPTUNE 2 SYS 1 PORT 702-025-000

## (undated) DEVICE — PACK HAND CUSTOM ASC

## (undated) DEVICE — Device

## (undated) DEVICE — BRUSH SURGICAL SCRUB W/4% CHG SOL 25ML 371073

## (undated) DEVICE — DRSG KERLIX FLUFFS X5

## (undated) RX ORDER — CEFAZOLIN SODIUM 1 G/50ML
SOLUTION INTRAVENOUS
Status: DISPENSED
Start: 2019-11-05

## (undated) RX ORDER — LIDOCAINE HYDROCHLORIDE 20 MG/ML
INJECTION, SOLUTION EPIDURAL; INFILTRATION; INTRACAUDAL; PERINEURAL
Status: DISPENSED
Start: 2019-11-05

## (undated) RX ORDER — CEFAZOLIN SODIUM 1 G/3ML
INJECTION, POWDER, FOR SOLUTION INTRAMUSCULAR; INTRAVENOUS
Status: DISPENSED
Start: 2019-11-05

## (undated) RX ORDER — PROPOFOL 10 MG/ML
INJECTION, EMULSION INTRAVENOUS
Status: DISPENSED
Start: 2019-11-05

## (undated) RX ORDER — ACETAMINOPHEN 325 MG/1
TABLET ORAL
Status: DISPENSED
Start: 2019-11-05

## (undated) RX ORDER — FENTANYL CITRATE 50 UG/ML
INJECTION, SOLUTION INTRAMUSCULAR; INTRAVENOUS
Status: DISPENSED
Start: 2019-11-05

## (undated) RX ORDER — GABAPENTIN 300 MG/1
CAPSULE ORAL
Status: DISPENSED
Start: 2019-11-05